# Patient Record
Sex: FEMALE | Race: WHITE | NOT HISPANIC OR LATINO | Employment: OTHER | URBAN - METROPOLITAN AREA
[De-identification: names, ages, dates, MRNs, and addresses within clinical notes are randomized per-mention and may not be internally consistent; named-entity substitution may affect disease eponyms.]

---

## 2023-02-03 ENCOUNTER — APPOINTMENT (OUTPATIENT)
Dept: RADIOLOGY | Facility: MEDICAL CENTER | Age: 63
DRG: 853 | End: 2023-02-03
Attending: EMERGENCY MEDICINE
Payer: MEDICARE

## 2023-02-03 ENCOUNTER — HOSPITAL ENCOUNTER (INPATIENT)
Facility: MEDICAL CENTER | Age: 63
LOS: 6 days | DRG: 853 | End: 2023-02-09
Attending: EMERGENCY MEDICINE | Admitting: INTERNAL MEDICINE
Payer: MEDICARE

## 2023-02-03 DIAGNOSIS — E11.10 DKA, TYPE 2, NOT AT GOAL (HCC): ICD-10-CM

## 2023-02-03 DIAGNOSIS — Z78.9 IMPAIRED MOBILITY AND ADLS: ICD-10-CM

## 2023-02-03 DIAGNOSIS — Z74.09 IMPAIRED MOBILITY AND ADLS: ICD-10-CM

## 2023-02-03 DIAGNOSIS — Z86.718 HISTORY OF DVT (DEEP VEIN THROMBOSIS): ICD-10-CM

## 2023-02-03 DIAGNOSIS — K12.2 ORAL ABSCESS: ICD-10-CM

## 2023-02-03 PROBLEM — E87.1 HYPONATREMIA: Status: ACTIVE | Noted: 2023-02-03

## 2023-02-03 PROBLEM — I82.409 DVT (DEEP VENOUS THROMBOSIS) (HCC): Status: ACTIVE | Noted: 2022-07-09

## 2023-02-03 PROBLEM — N17.9 AKI (ACUTE KIDNEY INJURY) (HCC): Status: ACTIVE | Noted: 2023-02-03

## 2023-02-03 PROBLEM — F15.90 AMPHETAMINE USE: Status: ACTIVE | Noted: 2023-02-03

## 2023-02-03 PROBLEM — E83.39 HYPERPHOSPHATEMIA: Status: ACTIVE | Noted: 2023-02-03

## 2023-02-03 PROBLEM — E87.20 METABOLIC ACIDOSIS: Status: ACTIVE | Noted: 2023-02-03

## 2023-02-03 PROBLEM — K85.90 ACUTE PANCREATITIS: Status: ACTIVE | Noted: 2023-02-03

## 2023-02-03 LAB
ALBUMIN SERPL BCP-MCNC: 3.2 G/DL (ref 3.2–4.9)
ALBUMIN SERPL BCP-MCNC: 4 G/DL (ref 3.2–4.9)
ALBUMIN/GLOB SERPL: 1 G/DL
ALBUMIN/GLOB SERPL: 1.1 G/DL
ALP SERPL-CCNC: 180 U/L (ref 30–99)
ALP SERPL-CCNC: 204 U/L (ref 30–99)
ALT SERPL-CCNC: 11 U/L (ref 2–50)
ALT SERPL-CCNC: 9 U/L (ref 2–50)
AMPHET UR QL SCN: POSITIVE
ANION GAP SERPL CALC-SCNC: 25 MMOL/L (ref 7–16)
ANION GAP SERPL CALC-SCNC: 31 MMOL/L (ref 7–16)
ANION GAP SERPL CALC-SCNC: 32 MMOL/L (ref 7–16)
ANION GAP SERPL CALC-SCNC: 33 MMOL/L (ref 7–16)
APPEARANCE UR: CLEAR
AST SERPL-CCNC: 11 U/L (ref 12–45)
AST SERPL-CCNC: 12 U/L (ref 12–45)
B-OH-BUTYR SERPL-MCNC: >8 MMOL/L (ref 0.02–0.27)
BACTERIA #/AREA URNS HPF: NEGATIVE /HPF
BARBITURATES UR QL SCN: NEGATIVE
BASOPHILS # BLD AUTO: 0.3 % (ref 0–1.8)
BASOPHILS # BLD AUTO: 0.5 % (ref 0–1.8)
BASOPHILS # BLD: 0.06 K/UL (ref 0–0.12)
BASOPHILS # BLD: 0.08 K/UL (ref 0–0.12)
BENZODIAZ UR QL SCN: NEGATIVE
BILIRUB SERPL-MCNC: 0.2 MG/DL (ref 0.1–1.5)
BILIRUB SERPL-MCNC: <0.2 MG/DL (ref 0.1–1.5)
BILIRUB UR QL STRIP.AUTO: NEGATIVE
BUN SERPL-MCNC: 39 MG/DL (ref 8–22)
BUN SERPL-MCNC: 40 MG/DL (ref 8–22)
BZE UR QL SCN: NEGATIVE
CALCIUM ALBUM COR SERPL-MCNC: 10.3 MG/DL (ref 8.5–10.5)
CALCIUM ALBUM COR SERPL-MCNC: 10.3 MG/DL (ref 8.5–10.5)
CALCIUM SERPL-MCNC: 10.1 MG/DL (ref 8.5–10.5)
CALCIUM SERPL-MCNC: 10.3 MG/DL (ref 8.5–10.5)
CALCIUM SERPL-MCNC: 9.7 MG/DL (ref 8.5–10.5)
CALCIUM SERPL-MCNC: 9.9 MG/DL (ref 8.5–10.5)
CANNABINOIDS UR QL SCN: NEGATIVE
CHLORIDE SERPL-SCNC: 104 MMOL/L (ref 96–112)
CHLORIDE SERPL-SCNC: 92 MMOL/L (ref 96–112)
CHLORIDE SERPL-SCNC: 95 MMOL/L (ref 96–112)
CHLORIDE SERPL-SCNC: 96 MMOL/L (ref 96–112)
CHOLEST SERPL-MCNC: 185 MG/DL (ref 100–199)
CO2 SERPL-SCNC: 3 MMOL/L (ref 20–33)
CO2 SERPL-SCNC: 6 MMOL/L (ref 20–33)
COLOR UR: YELLOW
CREAT SERPL-MCNC: 1.63 MG/DL (ref 0.5–1.4)
CREAT SERPL-MCNC: 1.65 MG/DL (ref 0.5–1.4)
CREAT SERPL-MCNC: 1.69 MG/DL (ref 0.5–1.4)
CREAT SERPL-MCNC: 1.89 MG/DL (ref 0.5–1.4)
EKG IMPRESSION: NORMAL
EOSINOPHIL # BLD AUTO: 0 K/UL (ref 0–0.51)
EOSINOPHIL # BLD AUTO: 0.01 K/UL (ref 0–0.51)
EOSINOPHIL NFR BLD: 0 % (ref 0–6.9)
EOSINOPHIL NFR BLD: 0.1 % (ref 0–6.9)
EPI CELLS #/AREA URNS HPF: NEGATIVE /HPF
ERYTHROCYTE [DISTWIDTH] IN BLOOD BY AUTOMATED COUNT: 45.6 FL (ref 35.9–50)
ERYTHROCYTE [DISTWIDTH] IN BLOOD BY AUTOMATED COUNT: 45.7 FL (ref 35.9–50)
GFR SERPLBLD CREATININE-BSD FMLA CKD-EPI: 30 ML/MIN/1.73 M 2
GFR SERPLBLD CREATININE-BSD FMLA CKD-EPI: 34 ML/MIN/1.73 M 2
GFR SERPLBLD CREATININE-BSD FMLA CKD-EPI: 35 ML/MIN/1.73 M 2
GFR SERPLBLD CREATININE-BSD FMLA CKD-EPI: 36 ML/MIN/1.73 M 2
GLOBULIN SER CALC-MCNC: 3.2 G/DL (ref 1.9–3.5)
GLOBULIN SER CALC-MCNC: 3.8 G/DL (ref 1.9–3.5)
GLUCOSE BLD STRIP.AUTO-MCNC: >600 MG/DL (ref 65–99)
GLUCOSE SERPL-MCNC: 459 MG/DL (ref 65–99)
GLUCOSE SERPL-MCNC: 698 MG/DL (ref 65–99)
GLUCOSE SERPL-MCNC: 745 MG/DL (ref 65–99)
GLUCOSE SERPL-MCNC: 764 MG/DL (ref 65–99)
GLUCOSE UR STRIP.AUTO-MCNC: >=1000 MG/DL
GRAM STN SPEC: NORMAL
HCT VFR BLD AUTO: 49.3 % (ref 37–47)
HCT VFR BLD AUTO: 50.3 % (ref 37–47)
HDLC SERPL-MCNC: 59 MG/DL
HGB BLD-MCNC: 16 G/DL (ref 12–16)
HGB BLD-MCNC: 16.5 G/DL (ref 12–16)
HYALINE CASTS #/AREA URNS LPF: ABNORMAL /LPF
IMM GRANULOCYTES # BLD AUTO: 0.19 K/UL (ref 0–0.11)
IMM GRANULOCYTES # BLD AUTO: 0.25 K/UL (ref 0–0.11)
IMM GRANULOCYTES NFR BLD AUTO: 1 % (ref 0–0.9)
IMM GRANULOCYTES NFR BLD AUTO: 1.4 % (ref 0–0.9)
INR PPP: 1.39 (ref 0.87–1.13)
KETONES UR STRIP.AUTO-MCNC: 80 MG/DL
LACTATE SERPL-SCNC: 2 MMOL/L (ref 0.5–2)
LACTATE SERPL-SCNC: 2.1 MMOL/L (ref 0.5–2)
LACTATE SERPL-SCNC: 3.6 MMOL/L (ref 0.5–2)
LDLC SERPL CALC-MCNC: 81 MG/DL
LEUKOCYTE ESTERASE UR QL STRIP.AUTO: NEGATIVE
LIPASE SERPL-CCNC: >3000 U/L (ref 11–82)
LYMPHOCYTES # BLD AUTO: 0.83 K/UL (ref 1–4.8)
LYMPHOCYTES # BLD AUTO: 0.89 K/UL (ref 1–4.8)
LYMPHOCYTES NFR BLD: 4.7 % (ref 22–41)
LYMPHOCYTES NFR BLD: 4.9 % (ref 22–41)
MAGNESIUM SERPL-MCNC: 1.8 MG/DL (ref 1.5–2.5)
MAGNESIUM SERPL-MCNC: 2.1 MG/DL (ref 1.5–2.5)
MAGNESIUM SERPL-MCNC: 2.4 MG/DL (ref 1.5–2.5)
MCH RBC QN AUTO: 30.7 PG (ref 27–33)
MCH RBC QN AUTO: 31 PG (ref 27–33)
MCHC RBC AUTO-ENTMCNC: 32.5 G/DL (ref 33.6–35)
MCHC RBC AUTO-ENTMCNC: 32.8 G/DL (ref 33.6–35)
MCV RBC AUTO: 94.4 FL (ref 81.4–97.8)
MCV RBC AUTO: 94.4 FL (ref 81.4–97.8)
METHADONE UR QL SCN: NEGATIVE
MICRO URNS: ABNORMAL
MONOCYTES # BLD AUTO: 2.21 K/UL (ref 0–0.85)
MONOCYTES # BLD AUTO: 2.26 K/UL (ref 0–0.85)
MONOCYTES NFR BLD AUTO: 12.1 % (ref 0–13.4)
MONOCYTES NFR BLD AUTO: 12.9 % (ref 0–13.4)
NEUTROPHILS # BLD AUTO: 14.09 K/UL (ref 2–7.15)
NEUTROPHILS # BLD AUTO: 14.9 K/UL (ref 2–7.15)
NEUTROPHILS NFR BLD: 80.4 % (ref 44–72)
NEUTROPHILS NFR BLD: 81.7 % (ref 44–72)
NITRITE UR QL STRIP.AUTO: NEGATIVE
NRBC # BLD AUTO: 0 K/UL
NRBC # BLD AUTO: 0 K/UL
NRBC BLD-RTO: 0 /100 WBC
NRBC BLD-RTO: 0 /100 WBC
OPIATES UR QL SCN: NEGATIVE
OXYCODONE UR QL SCN: POSITIVE
PCP UR QL SCN: NEGATIVE
PH UR STRIP.AUTO: 5 [PH] (ref 5–8)
PHOSPHATE SERPL-MCNC: 3.5 MG/DL (ref 2.5–4.5)
PHOSPHATE SERPL-MCNC: 6.5 MG/DL (ref 2.5–4.5)
PHOSPHATE SERPL-MCNC: 8.7 MG/DL (ref 2.5–4.5)
PLATELET # BLD AUTO: 375 K/UL (ref 164–446)
PLATELET # BLD AUTO: 447 K/UL (ref 164–446)
PMV BLD AUTO: 10.7 FL (ref 9–12.9)
PMV BLD AUTO: 10.9 FL (ref 9–12.9)
POTASSIUM SERPL-SCNC: 3.3 MMOL/L (ref 3.6–5.5)
POTASSIUM SERPL-SCNC: 4.7 MMOL/L (ref 3.6–5.5)
POTASSIUM SERPL-SCNC: 4.8 MMOL/L (ref 3.6–5.5)
POTASSIUM SERPL-SCNC: 5.4 MMOL/L (ref 3.6–5.5)
PROPOXYPH UR QL SCN: NEGATIVE
PROT SERPL-MCNC: 6.4 G/DL (ref 6–8.2)
PROT SERPL-MCNC: 7.8 G/DL (ref 6–8.2)
PROT UR QL STRIP: 100 MG/DL
PROTHROMBIN TIME: 16.8 SEC (ref 12–14.6)
RBC # BLD AUTO: 5.22 M/UL (ref 4.2–5.4)
RBC # BLD AUTO: 5.33 M/UL (ref 4.2–5.4)
RBC # URNS HPF: ABNORMAL /HPF
RBC UR QL AUTO: ABNORMAL
SIGNIFICANT IND 70042: NORMAL
SITE SITE: NORMAL
SODIUM SERPL-SCNC: 128 MMOL/L (ref 135–145)
SODIUM SERPL-SCNC: 130 MMOL/L (ref 135–145)
SODIUM SERPL-SCNC: 130 MMOL/L (ref 135–145)
SODIUM SERPL-SCNC: 135 MMOL/L (ref 135–145)
SOURCE SOURCE: NORMAL
SP GR UR STRIP.AUTO: 1.02
TRIGL SERPL-MCNC: 224 MG/DL (ref 0–149)
UROBILINOGEN UR STRIP.AUTO-MCNC: 0.2 MG/DL
WBC # BLD AUTO: 17.5 K/UL (ref 4.8–10.8)
WBC # BLD AUTO: 18.3 K/UL (ref 4.8–10.8)
WBC #/AREA URNS HPF: ABNORMAL /HPF

## 2023-02-03 PROCEDURE — 700111 HCHG RX REV CODE 636 W/ 250 OVERRIDE (IP): Performed by: EMERGENCY MEDICINE

## 2023-02-03 PROCEDURE — 84100 ASSAY OF PHOSPHORUS: CPT

## 2023-02-03 PROCEDURE — 85610 PROTHROMBIN TIME: CPT

## 2023-02-03 PROCEDURE — 303977 HCHG I & D

## 2023-02-03 PROCEDURE — 70491 CT SOFT TISSUE NECK W/DYE: CPT

## 2023-02-03 PROCEDURE — 700102 HCHG RX REV CODE 250 W/ 637 OVERRIDE(OP): Performed by: EMERGENCY MEDICINE

## 2023-02-03 PROCEDURE — 770022 HCHG ROOM/CARE - ICU (200)

## 2023-02-03 PROCEDURE — 96375 TX/PRO/DX INJ NEW DRUG ADDON: CPT

## 2023-02-03 PROCEDURE — A9270 NON-COVERED ITEM OR SERVICE: HCPCS | Performed by: EMERGENCY MEDICINE

## 2023-02-03 PROCEDURE — 82010 KETONE BODYS QUAN: CPT

## 2023-02-03 PROCEDURE — 87040 BLOOD CULTURE FOR BACTERIA: CPT | Mod: 91

## 2023-02-03 PROCEDURE — 87077 CULTURE AEROBIC IDENTIFY: CPT

## 2023-02-03 PROCEDURE — 71045 X-RAY EXAM CHEST 1 VIEW: CPT

## 2023-02-03 PROCEDURE — 700102 HCHG RX REV CODE 250 W/ 637 OVERRIDE(OP): Performed by: INTERNAL MEDICINE

## 2023-02-03 PROCEDURE — 83605 ASSAY OF LACTIC ACID: CPT

## 2023-02-03 PROCEDURE — 99291 CRITICAL CARE FIRST HOUR: CPT | Mod: GC | Performed by: INTERNAL MEDICINE

## 2023-02-03 PROCEDURE — 700105 HCHG RX REV CODE 258: Performed by: EMERGENCY MEDICINE

## 2023-02-03 PROCEDURE — 36415 COLL VENOUS BLD VENIPUNCTURE: CPT

## 2023-02-03 PROCEDURE — 96365 THER/PROPH/DIAG IV INF INIT: CPT

## 2023-02-03 PROCEDURE — 80307 DRUG TEST PRSMV CHEM ANLYZR: CPT

## 2023-02-03 PROCEDURE — 303105 HCHG CATHETER EXTRA

## 2023-02-03 PROCEDURE — 87070 CULTURE OTHR SPECIMN AEROBIC: CPT

## 2023-02-03 PROCEDURE — 99291 CRITICAL CARE FIRST HOUR: CPT

## 2023-02-03 PROCEDURE — 85025 COMPLETE CBC W/AUTO DIFF WBC: CPT

## 2023-02-03 PROCEDURE — 51702 INSERT TEMP BLADDER CATH: CPT

## 2023-02-03 PROCEDURE — 83690 ASSAY OF LIPASE: CPT

## 2023-02-03 PROCEDURE — 93005 ELECTROCARDIOGRAM TRACING: CPT | Performed by: EMERGENCY MEDICINE

## 2023-02-03 PROCEDURE — 0J910ZZ DRAINAGE OF FACE SUBCUTANEOUS TISSUE AND FASCIA, OPEN APPROACH: ICD-10-PCS | Performed by: EMERGENCY MEDICINE

## 2023-02-03 PROCEDURE — 700105 HCHG RX REV CODE 258: Performed by: INTERNAL MEDICINE

## 2023-02-03 PROCEDURE — 80061 LIPID PANEL: CPT

## 2023-02-03 PROCEDURE — 76705 ECHO EXAM OF ABDOMEN: CPT

## 2023-02-03 PROCEDURE — 82962 GLUCOSE BLOOD TEST: CPT | Mod: 91

## 2023-02-03 PROCEDURE — 87076 CULTURE ANAEROBE IDENT EACH: CPT

## 2023-02-03 PROCEDURE — 87086 URINE CULTURE/COLONY COUNT: CPT

## 2023-02-03 PROCEDURE — 80048 BASIC METABOLIC PNL TOTAL CA: CPT

## 2023-02-03 PROCEDURE — 700117 HCHG RX CONTRAST REV CODE 255: Performed by: EMERGENCY MEDICINE

## 2023-02-03 PROCEDURE — 80053 COMPREHEN METABOLIC PANEL: CPT

## 2023-02-03 PROCEDURE — 700111 HCHG RX REV CODE 636 W/ 250 OVERRIDE (IP): Performed by: STUDENT IN AN ORGANIZED HEALTH CARE EDUCATION/TRAINING PROGRAM

## 2023-02-03 PROCEDURE — 700111 HCHG RX REV CODE 636 W/ 250 OVERRIDE (IP): Performed by: INTERNAL MEDICINE

## 2023-02-03 PROCEDURE — 83735 ASSAY OF MAGNESIUM: CPT

## 2023-02-03 PROCEDURE — 81001 URINALYSIS AUTO W/SCOPE: CPT

## 2023-02-03 PROCEDURE — 87205 SMEAR GRAM STAIN: CPT

## 2023-02-03 RX ORDER — OXYCODONE HYDROCHLORIDE 5 MG/1
5 TABLET ORAL
Status: DISCONTINUED | OUTPATIENT
Start: 2023-02-03 | End: 2023-02-05

## 2023-02-03 RX ORDER — BISACODYL 10 MG
10 SUPPOSITORY, RECTAL RECTAL
Status: DISCONTINUED | OUTPATIENT
Start: 2023-02-03 | End: 2023-02-09 | Stop reason: HOSPADM

## 2023-02-03 RX ORDER — HYDROMORPHONE HYDROCHLORIDE 1 MG/ML
1 INJECTION, SOLUTION INTRAMUSCULAR; INTRAVENOUS; SUBCUTANEOUS ONCE
Status: COMPLETED | OUTPATIENT
Start: 2023-02-03 | End: 2023-02-03

## 2023-02-03 RX ORDER — NYSTATIN 100000 [USP'U]/G
POWDER TOPICAL 2 TIMES DAILY
Status: COMPLETED | OUTPATIENT
Start: 2023-02-03 | End: 2023-02-06

## 2023-02-03 RX ORDER — HEPARIN SODIUM 5000 [USP'U]/ML
5000 INJECTION, SOLUTION INTRAVENOUS; SUBCUTANEOUS EVERY 8 HOURS
Status: DISCONTINUED | OUTPATIENT
Start: 2023-02-03 | End: 2023-02-03

## 2023-02-03 RX ORDER — POTASSIUM CHLORIDE 7.45 MG/ML
10 INJECTION INTRAVENOUS
Status: COMPLETED | OUTPATIENT
Start: 2023-02-04 | End: 2023-02-04

## 2023-02-03 RX ORDER — DEXTROSE AND SODIUM CHLORIDE 10; .45 G/100ML; G/100ML
INJECTION, SOLUTION INTRAVENOUS CONTINUOUS
Status: ACTIVE | OUTPATIENT
Start: 2023-02-03 | End: 2023-02-04

## 2023-02-03 RX ORDER — DEXTROSE, SODIUM CHLORIDE, SODIUM LACTATE, POTASSIUM CHLORIDE, AND CALCIUM CHLORIDE 5; .6; .31; .03; .02 G/100ML; G/100ML; G/100ML; G/100ML; G/100ML
INJECTION, SOLUTION INTRAVENOUS CONTINUOUS
Status: DISCONTINUED | OUTPATIENT
Start: 2023-02-03 | End: 2023-02-04

## 2023-02-03 RX ORDER — HYDROMORPHONE HYDROCHLORIDE 1 MG/ML
0.5 INJECTION, SOLUTION INTRAMUSCULAR; INTRAVENOUS; SUBCUTANEOUS
Status: DISCONTINUED | OUTPATIENT
Start: 2023-02-03 | End: 2023-02-05

## 2023-02-03 RX ORDER — MAGNESIUM SULFATE HEPTAHYDRATE 40 MG/ML
4 INJECTION, SOLUTION INTRAVENOUS
Status: COMPLETED | OUTPATIENT
Start: 2023-02-03 | End: 2023-02-03

## 2023-02-03 RX ORDER — SODIUM CHLORIDE, SODIUM LACTATE, POTASSIUM CHLORIDE, AND CALCIUM CHLORIDE .6; .31; .03; .02 G/100ML; G/100ML; G/100ML; G/100ML
2000 INJECTION, SOLUTION INTRAVENOUS ONCE
Status: ACTIVE | OUTPATIENT
Start: 2023-02-03 | End: 2023-02-04

## 2023-02-03 RX ORDER — SODIUM CHLORIDE 9 MG/ML
2000 INJECTION, SOLUTION INTRAVENOUS ONCE
Status: DISCONTINUED | OUTPATIENT
Start: 2023-02-03 | End: 2023-02-03

## 2023-02-03 RX ORDER — AMOXICILLIN 250 MG
2 CAPSULE ORAL 2 TIMES DAILY
Status: DISCONTINUED | OUTPATIENT
Start: 2023-02-03 | End: 2023-02-09 | Stop reason: HOSPADM

## 2023-02-03 RX ORDER — POLYETHYLENE GLYCOL 3350 17 G/17G
1 POWDER, FOR SOLUTION ORAL
Status: DISCONTINUED | OUTPATIENT
Start: 2023-02-03 | End: 2023-02-09 | Stop reason: HOSPADM

## 2023-02-03 RX ORDER — ACETAMINOPHEN 500 MG
1000 TABLET ORAL EVERY 6 HOURS
Status: DISCONTINUED | OUTPATIENT
Start: 2023-02-03 | End: 2023-02-04

## 2023-02-03 RX ORDER — OXYCODONE HYDROCHLORIDE 10 MG/1
10 TABLET ORAL
Status: DISCONTINUED | OUTPATIENT
Start: 2023-02-03 | End: 2023-02-05

## 2023-02-03 RX ORDER — ACETAMINOPHEN 500 MG
1000 TABLET ORAL EVERY 6 HOURS PRN
Status: DISCONTINUED | OUTPATIENT
Start: 2023-02-08 | End: 2023-02-04

## 2023-02-03 RX ORDER — SODIUM CHLORIDE 9 MG/ML
INJECTION, SOLUTION INTRAVENOUS CONTINUOUS
Status: DISCONTINUED | OUTPATIENT
Start: 2023-02-03 | End: 2023-02-05

## 2023-02-03 RX ORDER — SODIUM CHLORIDE, SODIUM LACTATE, POTASSIUM CHLORIDE, AND CALCIUM CHLORIDE .6; .31; .03; .02 G/100ML; G/100ML; G/100ML; G/100ML
30 INJECTION, SOLUTION INTRAVENOUS ONCE
Status: COMPLETED | OUTPATIENT
Start: 2023-02-03 | End: 2023-02-03

## 2023-02-03 RX ORDER — MAGNESIUM SULFATE HEPTAHYDRATE 40 MG/ML
2 INJECTION, SOLUTION INTRAVENOUS
Status: COMPLETED | OUTPATIENT
Start: 2023-02-03 | End: 2023-02-03

## 2023-02-03 RX ORDER — SODIUM CHLORIDE, SODIUM LACTATE, POTASSIUM CHLORIDE, CALCIUM CHLORIDE 600; 310; 30; 20 MG/100ML; MG/100ML; MG/100ML; MG/100ML
INJECTION, SOLUTION INTRAVENOUS CONTINUOUS
Status: DISCONTINUED | OUTPATIENT
Start: 2023-02-03 | End: 2023-02-04

## 2023-02-03 RX ADMIN — SODIUM CHLORIDE, POTASSIUM CHLORIDE, SODIUM LACTATE AND CALCIUM CHLORIDE: 600; 310; 30; 20 INJECTION, SOLUTION INTRAVENOUS at 17:05

## 2023-02-03 RX ADMIN — AMPICILLIN AND SULBACTAM 3 G: 1; 2 INJECTION, POWDER, FOR SOLUTION INTRAMUSCULAR; INTRAVENOUS at 15:09

## 2023-02-03 RX ADMIN — SODIUM CHLORIDE 5 UNITS/HR: 9 INJECTION, SOLUTION INTRAVENOUS at 17:03

## 2023-02-03 RX ADMIN — POTASSIUM CHLORIDE 10 MEQ: 7.46 INJECTION, SOLUTION INTRAVENOUS at 23:45

## 2023-02-03 RX ADMIN — MAGNESIUM SULFATE HEPTAHYDRATE 2 G: 40 INJECTION, SOLUTION INTRAVENOUS at 18:53

## 2023-02-03 RX ADMIN — SODIUM CHLORIDE, POTASSIUM CHLORIDE, SODIUM LACTATE AND CALCIUM CHLORIDE 1917 ML: 600; 310; 30; 20 INJECTION, SOLUTION INTRAVENOUS at 14:04

## 2023-02-03 RX ADMIN — HYDROMORPHONE HYDROCHLORIDE 1 MG: 1 INJECTION, SOLUTION INTRAMUSCULAR; INTRAVENOUS; SUBCUTANEOUS at 15:26

## 2023-02-03 RX ADMIN — NYSTATIN: 100000 POWDER TOPICAL at 15:10

## 2023-02-03 RX ADMIN — SODIUM CHLORIDE: 9 INJECTION, SOLUTION INTRAVENOUS at 18:52

## 2023-02-03 RX ADMIN — HYDROMORPHONE HYDROCHLORIDE 0.5 MG: 1 INJECTION, SOLUTION INTRAMUSCULAR; INTRAVENOUS; SUBCUTANEOUS at 20:40

## 2023-02-03 RX ADMIN — IOHEXOL 100 ML: 350 INJECTION, SOLUTION INTRAVENOUS at 16:33

## 2023-02-03 RX ADMIN — FENTANYL CITRATE 50 MCG: 50 INJECTION, SOLUTION INTRAMUSCULAR; INTRAVENOUS at 14:03

## 2023-02-03 RX ADMIN — HEPARIN SODIUM 5000 UNITS: 5000 INJECTION, SOLUTION INTRAVENOUS; SUBCUTANEOUS at 22:56

## 2023-02-03 RX ADMIN — AMPICILLIN AND SULBACTAM 3 G: 1; 2 INJECTION, POWDER, FOR SOLUTION INTRAMUSCULAR; INTRAVENOUS at 23:08

## 2023-02-03 ASSESSMENT — FIBROSIS 4 INDEX: FIB4 SCORE: 0.78

## 2023-02-03 ASSESSMENT — ENCOUNTER SYMPTOMS
DIZZINESS: 1
SHORTNESS OF BREATH: 0
HEADACHES: 0
ABDOMINAL PAIN: 0
FOCAL WEAKNESS: 0
NAUSEA: 0
COUGH: 0
VOMITING: 0
CHILLS: 0
FEVER: 0
WEAKNESS: 1

## 2023-02-03 ASSESSMENT — PAIN DESCRIPTION - PAIN TYPE
TYPE: ACUTE PAIN

## 2023-02-03 NOTE — ED PROVIDER NOTES
"ED Provider Note    CHIEF COMPLAINT  Chief Complaint   Patient presents with    ALOC       EXTERNAL RECORDS REVIEWED  Prior emergency department visits and admission from [facility].    HPI/ROS  LIMITATION TO HISTORY   Select: Altered mental status / Confusion  OUTSIDE HISTORIAN(S):  EMS      Fannie Driver is a 62 y.o. female who presents altered.  Patient has longstanding history of diabetes, and DVT.  Patient was brought in by EMS.  Patient significantly agitated, and unable to provide much history here.  She is mildly confused.  Patient reports pain all over.  She reports pain in her face, and she reports has had what she believes is a dental abscess for the last 2 weeks.  She reports she had something similar in the past.  Patient is unsure if she has had any fever.  Patient denies any abdominal pain.  She reports some pain near her vagina.  Patient Dors is nausea and vomiting.  Patient history is limited as she is very agitated and only sporadically will answer questions clearly.    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    SURGICAL HISTORY   has a past surgical history that includes other abdominal surgery; cholecystectomy (10/09); and ercp in or (10/30/2009).    FAMILY HISTORY  No family history on file.    SOCIAL HISTORY  Social History     Tobacco Use    Smoking status: Not on file    Smokeless tobacco: Not on file   Substance and Sexual Activity    Alcohol use: Not on file    Drug use: Not on file    Sexual activity: Not on file       CURRENT MEDICATIONS  Home Medications    **Home medications have not yet been reviewed for this encounter**         ALLERGIES  No Known Allergies    PHYSICAL EXAM  VITAL SIGNS: /60   Pulse 79   Temp 35.8 °C (96.5 °F) (Temporal)   Resp 18   Ht 1.727 m (5' 8\")   Wt 99.8 kg (220 lb 0.3 oz)   SpO2 98%   BMI 33.45 kg/m²  "   Physical Exam  Constitutional:       Appearance: Normal appearance.   HENT:      Head: Atraumatic.      Comments: Left mandible, distal fluid with approximately 4 cm x 4 cm fluctuant mass with overlying erythema and tenderness with considerable facial swelling.     Right Ear: Tympanic membrane normal.      Left Ear: Tympanic membrane normal.      Nose: Nose normal.      Mouth/Throat:      Mouth: Mucous membranes are moist.   Eyes:      Extraocular Movements: Extraocular movements intact.      Pupils: Pupils are equal, round, and reactive to light.   Cardiovascular:      Rate and Rhythm: Normal rate and regular rhythm.      Heart sounds: No murmur heard.    No friction rub.   Pulmonary:      Breath sounds: Normal breath sounds.      Comments: Significantly tachypneic with Kussmaul breathing  Abdominal:      General: Abdomen is flat.      Palpations: Abdomen is soft.      Tenderness: There is no abdominal tenderness.   Genitourinary:     Comments: Well-demarcated beefy red erythema on patient's labia consistent with candidiasis, no extension of erythema into the perineum.  Musculoskeletal:      Comments: Moving all extremities.  No focal tenderness on exam.  Patient's bilateral feet are cold and relatively mottled however she has great pulses, both PT and DP are both with triphasic signals on Doppler.   Skin:     General: Skin is warm.      Capillary Refill: Capillary refill takes less than 2 seconds.   Neurological:      General: No focal deficit present.      Mental Status: She is alert and oriented to person, place, and time.   Psychiatric:         Mood and Affect: Mood normal.         Behavior: Behavior normal.         DIAGNOSTIC STUDIES / PROCEDURES  EKG  I have independently interpreted this EKG  See below    LABS  Results for orders placed or performed during the hospital encounter of 02/03/23   CBC With Differential   Result Value Ref Range    WBC 18.3 (H) 4.8 - 10.8 K/uL    RBC 5.33 4.20 - 5.40 M/uL     Hemoglobin 16.5 (H) 12.0 - 16.0 g/dL    Hematocrit 50.3 (H) 37.0 - 47.0 %    MCV 94.4 81.4 - 97.8 fL    MCH 31.0 27.0 - 33.0 pg    MCHC 32.8 (L) 33.6 - 35.0 g/dL    RDW 45.7 35.9 - 50.0 fL    Platelet Count 447 (H) 164 - 446 K/uL    MPV 10.9 9.0 - 12.9 fL    Neutrophils-Polys 81.70 (H) 44.00 - 72.00 %    Lymphocytes 4.90 (L) 22.00 - 41.00 %    Monocytes 12.10 0.00 - 13.40 %    Eosinophils 0.00 0.00 - 6.90 %    Basophils 0.30 0.00 - 1.80 %    Immature Granulocytes 1.00 (H) 0.00 - 0.90 %    Nucleated RBC 0.00 /100 WBC    Neutrophils (Absolute) 14.90 (H) 2.00 - 7.15 K/uL    Lymphs (Absolute) 0.89 (L) 1.00 - 4.80 K/uL    Monos (Absolute) 2.21 (H) 0.00 - 0.85 K/uL    Eos (Absolute) 0.00 0.00 - 0.51 K/uL    Baso (Absolute) 0.06 0.00 - 0.12 K/uL    Immature Granulocytes (abs) 0.19 (H) 0.00 - 0.11 K/uL    NRBC (Absolute) 0.00 K/uL   Comp Metabolic Panel   Result Value Ref Range    Sodium 128 (L) 135 - 145 mmol/L    Potassium 5.4 3.6 - 5.5 mmol/L    Chloride 92 (L) 96 - 112 mmol/L    Co2 3 (LL) 20 - 33 mmol/L    Anion Gap 33.0 (H) 7.0 - 16.0    Glucose 764 (HH) 65 - 99 mg/dL    Bun 39 (H) 8 - 22 mg/dL    Creatinine 1.89 (H) 0.50 - 1.40 mg/dL    Calcium 10.3 8.5 - 10.5 mg/dL    AST(SGOT) 11 (L) 12 - 45 U/L    ALT(SGPT) 11 2 - 50 U/L    Alkaline Phosphatase 204 (H) 30 - 99 U/L    Total Bilirubin 0.2 0.1 - 1.5 mg/dL    Albumin 4.0 3.2 - 4.9 g/dL    Total Protein 7.8 6.0 - 8.2 g/dL    Globulin 3.8 (H) 1.9 - 3.5 g/dL    A-G Ratio 1.1 g/dL   Urinalysis    Specimen: Urine   Result Value Ref Range    Color Yellow     Character Clear     Specific Gravity 1.022 <1.035    Ph 5.0 5.0 - 8.0    Glucose >=1000 (A) Negative mg/dL    Ketones 80 (A) Negative mg/dL    Protein 100 (A) Negative mg/dL    Bilirubin Negative Negative    Urobilinogen, Urine 0.2 Negative    Nitrite Negative Negative    Leukocyte Esterase Negative Negative    Occult Blood Moderate (A) Negative    Micro Urine Req Microscopic    Lactic Acid   Result Value Ref Range     Lactic Acid 3.6 (H) 0.5 - 2.0 mmol/L   Lactic Acid   Result Value Ref Range    Lactic Acid 2.1 (H) 0.5 - 2.0 mmol/L   Lipase   Result Value Ref Range    Lipase >3000 (H) 11 - 82 U/L   BETA-HYDROXYBUTYRIC ACID   Result Value Ref Range    beta-Hydroxybutyric Acid >8.00 (H) 0.02 - 0.27 mmol/L   URINE DRUG SCREEN   Result Value Ref Range    Amphetamines Urine Positive (A) Negative    Barbiturates Negative Negative    Benzodiazepines Negative Negative    Cocaine Metabolite Negative Negative    Methadone Negative Negative    Opiates Negative Negative    Oxycodone Positive (A) Negative    Phencyclidine -Pcp Negative Negative    Propoxyphene Negative Negative    Cannabinoid Metab Negative Negative   URINE MICROSCOPIC (W/UA)   Result Value Ref Range    WBC 2-5 /hpf    RBC 0-2 /hpf    Bacteria Negative None /hpf    Epithelial Cells Negative /hpf    Hyaline Cast 3-5 (A) /lpf   MAGNESIUM   Result Value Ref Range    Magnesium 2.1 1.5 - 2.5 mg/dL   PHOSPHORUS   Result Value Ref Range    Phosphorus 8.7 (H) 2.5 - 4.5 mg/dL   CORRECTED CALCIUM   Result Value Ref Range    Correct Calcium 10.3 8.5 - 10.5 mg/dL   ESTIMATED GFR   Result Value Ref Range    GFR (CKD-EPI) 30 (A) >60 mL/min/1.73 m 2   CBC with Differential   Result Value Ref Range    WBC 17.5 (H) 4.8 - 10.8 K/uL    RBC 5.22 4.20 - 5.40 M/uL    Hemoglobin 16.0 12.0 - 16.0 g/dL    Hematocrit 49.3 (H) 37.0 - 47.0 %    MCV 94.4 81.4 - 97.8 fL    MCH 30.7 27.0 - 33.0 pg    MCHC 32.5 (L) 33.6 - 35.0 g/dL    RDW 45.6 35.9 - 50.0 fL    Platelet Count 375 164 - 446 K/uL    MPV 10.7 9.0 - 12.9 fL    Neutrophils-Polys 80.40 (H) 44.00 - 72.00 %    Lymphocytes 4.70 (L) 22.00 - 41.00 %    Monocytes 12.90 0.00 - 13.40 %    Eosinophils 0.10 0.00 - 6.90 %    Basophils 0.50 0.00 - 1.80 %    Immature Granulocytes 1.40 (H) 0.00 - 0.90 %    Nucleated RBC 0.00 /100 WBC    Neutrophils (Absolute) 14.09 (H) 2.00 - 7.15 K/uL    Lymphs (Absolute) 0.83 (L) 1.00 - 4.80 K/uL    Monos (Absolute) 2.26 (H)  0.00 - 0.85 K/uL    Eos (Absolute) 0.01 0.00 - 0.51 K/uL    Baso (Absolute) 0.08 0.00 - 0.12 K/uL    Immature Granulocytes (abs) 0.25 (H) 0.00 - 0.11 K/uL    NRBC (Absolute) 0.00 K/uL   Prothrombin time (INR)   Result Value Ref Range    PT 16.8 (H) 12.0 - 14.6 sec    INR 1.39 (H) 0.87 - 1.13   EKG   Result Value Ref Range    Report       [facility] Emergency Dept.    Test Date:  2023  Pt Name:    IZA ORDONEZ                Department: ER  MRN:        [id number]                      Room:       [facility]  Gender:     Female                       Technician: [id number]  :        1960                   Requested By:WALLACE DAVID  Order #:    [id number]                    Reading MD: Tadeo Dixon MD    Measurements  Intervals                                Axis  Rate:       75                           P:          49  HI:         200                          QRS:        3  QRSD:       103                          T:          172  QT:         474  QTc:        530    Interpretive Statements  EKG is sinus rhythm, normal axis normal intervals no ST changes consistent  with  acute regional ischemia, I believe the QTC is being read incorrectly here, I  believe this is capturing part of patient's Uwave of and the QTC is normal.  Electronically Signed On 2-3-2023 14:41:19 PST by Tadeo Dixon MD     POCT glucose device results   Result Value Ref Range    POC Glucose, Blood >600 (HH) 65 - 99 mg/dL   POCT glucose device results   Result Value Ref Range    POC Glucose, Blood >600 (HH) 65 - 99 mg/dL         RADIOLOGY  I have independently interpreted the diagnostic imaging associated with this visit and am waiting the final reading from the radiologist.   My preliminary interpretation is a follows: Large dental abscess, Radiologist interpretation:   CT-SOFT TISSUE NECK WITH   Final Result      1.  LEFT facial subcutaneous fluid collection measuring 3.5 cm.  Abscess is a possibility however there is no  significant surrounding inflammatory changes   2.  Small LEFT anterior maxillary tooth abscess.   3.  Heterogeneous LEFT thyroid nodule measuring 18 mm.  Recommend follow-up nonemergent ultrasound.      US-RUQ   Final Result      1.  Prior cholecystectomy.   2.  Mild biliary dilation.   3.  Echogenic shadowing focus in the melania hepatis, possibly stone in the common hepatic duct.   4.  Limited evaluation of pancreas.      DX-CHEST-PORTABLE (1 VIEW)   Final Result      No acute cardiopulmonary disease.      [id number]/O    (Results Pending)         COURSE & MEDICAL DECISION MAKING      Incision and Drainage Procedure Note    Indication: Abscess    Procedure: Patient facial abscess was identified, using an 18-gauge needle, I drained the abscess to the buccal mucosa, I drained approximately 10 cc of purulent fluid.    The patient tolerated the procedure well.    Complications: None      45 minutes of critical care were spent with patient.    INITIAL ASSESSMENT, COURSE AND PLAN  Care Narrative:     Agitated patient here, tachypneic, with presentation concerning for possible DKA.  Patient also with facial abscess.  Given Unasyn.  I did perform a quick incision and drainage but patient will likely need further operative management.  Will check Panorex and CT for further evaluation to help our maxillofacial surgeons.  Patient bilateral lower extremities are mottled but have great pulses and signal, I believe vascular emergency is unlikely given this finding.  It is unclear if patient was outside, her presentation does appear most consistent with likely chilblains, and we have had multiple days below freezing lately.  Patient without any associated chest pain or shortness of breath, history is obviously limited given the patient is very agitated.  Sending urinalysis, and blood cultures.  Patient given aggressive IV fluids for possible developing sepsis versus DKA.  I performed a drainage of patient's facial  abscess.  Patient labs have returned, they are consistent with severe pancreatitis, and DKA.  Possibly secondary to sepsis.  Patient started on insulin drip.  Case discussed with oral surgery who will see patient when she is more medically stable.  Patient labs consistent with significant DKA.  Patient started on insulin drip.  Aggressive fluids will be continued.  Ultrasound ordered for patient's severe pancreatitis.  Patient case discussed with intensivist who has agreed to admit.  Patient ultrasound questionable for possible stone in the common bile duct or melania hepatis, MRI abdomen ordered.      DISPOSITION AND DISCUSSIONS      FINAL DIAGNOSIS  Dental abscess, DKA, severe pancreatitis, severe dehydration, altered mental status

## 2023-02-03 NOTE — ED NOTES
Per [facility] pharmacy, pt last filled metformin for 30 days on 10/21, trulicity on 11/2022, and xarelto 20 mg on 9/27 for 30 days.   Pt has medications at bedside, per bottles they were all last filled in 2022. Rx bottles are full.   Pt is unable to provide information if she is still taking medications.

## 2023-02-03 NOTE — ED NOTES
Pt sister Alisha called ph: [phone].would like someone to talk to her regarding pt's current condition. Educated Alisha pt would be getting admitted to hospital. Alisha requesting updater as soon as pt can give it

## 2023-02-03 NOTE — ED TRIAGE NOTES
BIB [facility] for ALOC. Hx DM2. Large abcess to left side of face. Alert to self only. Kussmaul breathing noted

## 2023-02-04 ENCOUNTER — APPOINTMENT (OUTPATIENT)
Dept: RADIOLOGY | Facility: MEDICAL CENTER | Age: 63
DRG: 853 | End: 2023-02-04
Payer: MEDICARE

## 2023-02-04 LAB
ANION GAP SERPL CALC-SCNC: 11 MMOL/L (ref 7–16)
ANION GAP SERPL CALC-SCNC: 12 MMOL/L (ref 7–16)
ANION GAP SERPL CALC-SCNC: 16 MMOL/L (ref 7–16)
ANION GAP SERPL CALC-SCNC: 16 MMOL/L (ref 7–16)
BUN SERPL-MCNC: 31 MG/DL (ref 8–22)
BUN SERPL-MCNC: 35 MG/DL (ref 8–22)
BUN SERPL-MCNC: 37 MG/DL (ref 8–22)
BUN SERPL-MCNC: 38 MG/DL (ref 8–22)
CALCIUM SERPL-MCNC: 9.4 MG/DL (ref 8.5–10.5)
CALCIUM SERPL-MCNC: 9.8 MG/DL (ref 8.5–10.5)
CALCIUM SERPL-MCNC: 9.8 MG/DL (ref 8.5–10.5)
CALCIUM SERPL-MCNC: 9.9 MG/DL (ref 8.5–10.5)
CHLORIDE SERPL-SCNC: 110 MMOL/L (ref 96–112)
CHLORIDE SERPL-SCNC: 110 MMOL/L (ref 96–112)
CHLORIDE SERPL-SCNC: 112 MMOL/L (ref 96–112)
CHLORIDE SERPL-SCNC: 117 MMOL/L (ref 96–112)
CO2 SERPL-SCNC: 11 MMOL/L (ref 20–33)
CO2 SERPL-SCNC: 12 MMOL/L (ref 20–33)
CO2 SERPL-SCNC: 15 MMOL/L (ref 20–33)
CO2 SERPL-SCNC: 15 MMOL/L (ref 20–33)
CREAT SERPL-MCNC: 1.1 MG/DL (ref 0.5–1.4)
CREAT SERPL-MCNC: 1.32 MG/DL (ref 0.5–1.4)
CREAT SERPL-MCNC: 1.34 MG/DL (ref 0.5–1.4)
CREAT SERPL-MCNC: 1.46 MG/DL (ref 0.5–1.4)
GFR SERPLBLD CREATININE-BSD FMLA CKD-EPI: 41 ML/MIN/1.73 M 2
GFR SERPLBLD CREATININE-BSD FMLA CKD-EPI: 45 ML/MIN/1.73 M 2
GFR SERPLBLD CREATININE-BSD FMLA CKD-EPI: 46 ML/MIN/1.73 M 2
GFR SERPLBLD CREATININE-BSD FMLA CKD-EPI: 57 ML/MIN/1.73 M 2
GLUCOSE BLD STRIP.AUTO-MCNC: 125 MG/DL (ref 65–99)
GLUCOSE BLD STRIP.AUTO-MCNC: 134 MG/DL (ref 65–99)
GLUCOSE BLD STRIP.AUTO-MCNC: 134 MG/DL (ref 65–99)
GLUCOSE BLD STRIP.AUTO-MCNC: 138 MG/DL (ref 65–99)
GLUCOSE BLD STRIP.AUTO-MCNC: 147 MG/DL (ref 65–99)
GLUCOSE BLD STRIP.AUTO-MCNC: 167 MG/DL (ref 65–99)
GLUCOSE BLD STRIP.AUTO-MCNC: 198 MG/DL (ref 65–99)
GLUCOSE BLD STRIP.AUTO-MCNC: 210 MG/DL (ref 65–99)
GLUCOSE BLD STRIP.AUTO-MCNC: 219 MG/DL (ref 65–99)
GLUCOSE BLD STRIP.AUTO-MCNC: 219 MG/DL (ref 65–99)
GLUCOSE BLD STRIP.AUTO-MCNC: 221 MG/DL (ref 65–99)
GLUCOSE BLD STRIP.AUTO-MCNC: 236 MG/DL (ref 65–99)
GLUCOSE BLD STRIP.AUTO-MCNC: 236 MG/DL (ref 65–99)
GLUCOSE BLD STRIP.AUTO-MCNC: 238 MG/DL (ref 65–99)
GLUCOSE BLD STRIP.AUTO-MCNC: 266 MG/DL (ref 65–99)
GLUCOSE BLD STRIP.AUTO-MCNC: 301 MG/DL (ref 65–99)
GLUCOSE BLD STRIP.AUTO-MCNC: 345 MG/DL (ref 65–99)
GLUCOSE BLD STRIP.AUTO-MCNC: 385 MG/DL (ref 65–99)
GLUCOSE BLD STRIP.AUTO-MCNC: 471 MG/DL (ref 65–99)
GLUCOSE BLD STRIP.AUTO-MCNC: 536 MG/DL (ref 65–99)
GLUCOSE BLD STRIP.AUTO-MCNC: 97 MG/DL (ref 65–99)
GLUCOSE SERPL-MCNC: 123 MG/DL (ref 65–99)
GLUCOSE SERPL-MCNC: 162 MG/DL (ref 65–99)
GLUCOSE SERPL-MCNC: 257 MG/DL (ref 65–99)
GLUCOSE SERPL-MCNC: 259 MG/DL (ref 65–99)
LACTATE SERPL-SCNC: 1.9 MMOL/L (ref 0.5–2)
MAGNESIUM SERPL-MCNC: 1.9 MG/DL (ref 1.5–2.5)
MAGNESIUM SERPL-MCNC: 2.3 MG/DL (ref 1.5–2.5)
PHOSPHATE SERPL-MCNC: 1.6 MG/DL (ref 2.5–4.5)
PHOSPHATE SERPL-MCNC: 2.7 MG/DL (ref 2.5–4.5)
POTASSIUM SERPL-SCNC: 3.2 MMOL/L (ref 3.6–5.5)
POTASSIUM SERPL-SCNC: 3.4 MMOL/L (ref 3.6–5.5)
POTASSIUM SERPL-SCNC: 3.5 MMOL/L (ref 3.6–5.5)
POTASSIUM SERPL-SCNC: 3.8 MMOL/L (ref 3.6–5.5)
SODIUM SERPL-SCNC: 137 MMOL/L (ref 135–145)
SODIUM SERPL-SCNC: 138 MMOL/L (ref 135–145)
SODIUM SERPL-SCNC: 139 MMOL/L (ref 135–145)
SODIUM SERPL-SCNC: 143 MMOL/L (ref 135–145)
T4 FREE SERPL-MCNC: 1.22 NG/DL (ref 0.93–1.7)
TSH SERPL DL<=0.005 MIU/L-ACNC: 1.11 UIU/ML (ref 0.38–5.33)

## 2023-02-04 PROCEDURE — 84439 ASSAY OF FREE THYROXINE: CPT

## 2023-02-04 PROCEDURE — 84100 ASSAY OF PHOSPHORUS: CPT

## 2023-02-04 PROCEDURE — 99291 CRITICAL CARE FIRST HOUR: CPT | Performed by: EMERGENCY MEDICINE

## 2023-02-04 PROCEDURE — 93970 EXTREMITY STUDY: CPT | Mod: 26 | Performed by: INTERNAL MEDICINE

## 2023-02-04 PROCEDURE — 82962 GLUCOSE BLOOD TEST: CPT

## 2023-02-04 PROCEDURE — 700111 HCHG RX REV CODE 636 W/ 250 OVERRIDE (IP): Performed by: GENERAL PRACTICE

## 2023-02-04 PROCEDURE — 83735 ASSAY OF MAGNESIUM: CPT | Mod: 91

## 2023-02-04 PROCEDURE — A9270 NON-COVERED ITEM OR SERVICE: HCPCS | Performed by: INTERNAL MEDICINE

## 2023-02-04 PROCEDURE — 770022 HCHG ROOM/CARE - ICU (200)

## 2023-02-04 PROCEDURE — 700102 HCHG RX REV CODE 250 W/ 637 OVERRIDE(OP): Performed by: INTERNAL MEDICINE

## 2023-02-04 PROCEDURE — 700102 HCHG RX REV CODE 250 W/ 637 OVERRIDE(OP): Performed by: EMERGENCY MEDICINE

## 2023-02-04 PROCEDURE — A9270 NON-COVERED ITEM OR SERVICE: HCPCS | Performed by: STUDENT IN AN ORGANIZED HEALTH CARE EDUCATION/TRAINING PROGRAM

## 2023-02-04 PROCEDURE — 700101 HCHG RX REV CODE 250: Performed by: INTERNAL MEDICINE

## 2023-02-04 PROCEDURE — 700111 HCHG RX REV CODE 636 W/ 250 OVERRIDE (IP): Performed by: INTERNAL MEDICINE

## 2023-02-04 PROCEDURE — 700102 HCHG RX REV CODE 250 W/ 637 OVERRIDE(OP): Performed by: STUDENT IN AN ORGANIZED HEALTH CARE EDUCATION/TRAINING PROGRAM

## 2023-02-04 PROCEDURE — 84443 ASSAY THYROID STIM HORMONE: CPT

## 2023-02-04 PROCEDURE — 93970 EXTREMITY STUDY: CPT

## 2023-02-04 PROCEDURE — 83605 ASSAY OF LACTIC ACID: CPT

## 2023-02-04 PROCEDURE — A9270 NON-COVERED ITEM OR SERVICE: HCPCS | Performed by: EMERGENCY MEDICINE

## 2023-02-04 PROCEDURE — 700111 HCHG RX REV CODE 636 W/ 250 OVERRIDE (IP): Performed by: EMERGENCY MEDICINE

## 2023-02-04 PROCEDURE — 80048 BASIC METABOLIC PNL TOTAL CA: CPT | Mod: 91

## 2023-02-04 PROCEDURE — 700105 HCHG RX REV CODE 258: Performed by: INTERNAL MEDICINE

## 2023-02-04 RX ORDER — POTASSIUM CHLORIDE 7.45 MG/ML
10 INJECTION INTRAVENOUS
Status: DISPENSED | OUTPATIENT
Start: 2023-02-04 | End: 2023-02-04

## 2023-02-04 RX ORDER — ACETAMINOPHEN 500 MG
1000 TABLET ORAL EVERY 6 HOURS
Status: DISCONTINUED | OUTPATIENT
Start: 2023-02-04 | End: 2023-02-09 | Stop reason: HOSPADM

## 2023-02-04 RX ORDER — POTASSIUM CHLORIDE 7.45 MG/ML
10 INJECTION INTRAVENOUS
Status: COMPLETED | OUTPATIENT
Start: 2023-02-04 | End: 2023-02-04

## 2023-02-04 RX ORDER — INSULIN LISPRO 100 [IU]/ML
2-9 INJECTION, SOLUTION INTRAVENOUS; SUBCUTANEOUS
Status: DISCONTINUED | OUTPATIENT
Start: 2023-02-04 | End: 2023-02-05

## 2023-02-04 RX ORDER — MAGNESIUM SULFATE HEPTAHYDRATE 40 MG/ML
2 INJECTION, SOLUTION INTRAVENOUS ONCE
Status: COMPLETED | OUTPATIENT
Start: 2023-02-04 | End: 2023-02-04

## 2023-02-04 RX ORDER — POTASSIUM CHLORIDE 7.45 MG/ML
10 INJECTION INTRAVENOUS
Status: ACTIVE | OUTPATIENT
Start: 2023-02-04 | End: 2023-02-04

## 2023-02-04 RX ORDER — INSULIN LISPRO 100 [IU]/ML
2-9 INJECTION, SOLUTION INTRAVENOUS; SUBCUTANEOUS
Status: DISCONTINUED | OUTPATIENT
Start: 2023-02-04 | End: 2023-02-04

## 2023-02-04 RX ADMIN — ACETAMINOPHEN 1000 MG: 500 TABLET, FILM COATED ORAL at 17:35

## 2023-02-04 RX ADMIN — POTASSIUM CHLORIDE 10 MEQ: 7.46 INJECTION, SOLUTION INTRAVENOUS at 01:49

## 2023-02-04 RX ADMIN — DEXTROSE AND SODIUM CHLORIDE: 10; .45 INJECTION, SOLUTION INTRAVENOUS at 12:30

## 2023-02-04 RX ADMIN — AMPICILLIN AND SULBACTAM 3 G: 1; 2 INJECTION, POWDER, FOR SOLUTION INTRAMUSCULAR; INTRAVENOUS at 06:03

## 2023-02-04 RX ADMIN — RIVAROXABAN 20 MG: 20 TABLET, FILM COATED ORAL at 17:36

## 2023-02-04 RX ADMIN — POTASSIUM CHLORIDE 10 MEQ: 7.46 INJECTION, SOLUTION INTRAVENOUS at 05:00

## 2023-02-04 RX ADMIN — AMPICILLIN AND SULBACTAM 3 G: 1; 2 INJECTION, POWDER, FOR SOLUTION INTRAMUSCULAR; INTRAVENOUS at 17:38

## 2023-02-04 RX ADMIN — INSULIN GLARGINE-YFGN 19 UNITS: 100 INJECTION, SOLUTION SUBCUTANEOUS at 16:46

## 2023-02-04 RX ADMIN — POTASSIUM CHLORIDE 10 MEQ: 7.46 INJECTION, SOLUTION INTRAVENOUS at 00:47

## 2023-02-04 RX ADMIN — POTASSIUM CHLORIDE 10 MEQ: 7.46 INJECTION, SOLUTION INTRAVENOUS at 02:50

## 2023-02-04 RX ADMIN — SODIUM CHLORIDE 13 UNITS/HR: 9 INJECTION, SOLUTION INTRAVENOUS at 15:37

## 2023-02-04 RX ADMIN — POTASSIUM CHLORIDE 10 MEQ: 7.46 INJECTION, SOLUTION INTRAVENOUS at 07:39

## 2023-02-04 RX ADMIN — AMPICILLIN AND SULBACTAM 3 G: 1; 2 INJECTION, POWDER, FOR SOLUTION INTRAMUSCULAR; INTRAVENOUS at 11:35

## 2023-02-04 RX ADMIN — POTASSIUM PHOSPHATE, MONOBASIC AND POTASSIUM PHOSPHATE, DIBASIC 30 MMOL: 224; 236 INJECTION, SOLUTION, CONCENTRATE INTRAVENOUS at 08:35

## 2023-02-04 RX ADMIN — POTASSIUM CHLORIDE 10 MEQ: 7.46 INJECTION, SOLUTION INTRAVENOUS at 12:45

## 2023-02-04 RX ADMIN — POTASSIUM CHLORIDE 10 MEQ: 7.46 INJECTION, SOLUTION INTRAVENOUS at 18:47

## 2023-02-04 RX ADMIN — HYDROMORPHONE HYDROCHLORIDE 0.5 MG: 1 INJECTION, SOLUTION INTRAMUSCULAR; INTRAVENOUS; SUBCUTANEOUS at 06:03

## 2023-02-04 RX ADMIN — HYDROMORPHONE HYDROCHLORIDE 0.5 MG: 1 INJECTION, SOLUTION INTRAMUSCULAR; INTRAVENOUS; SUBCUTANEOUS at 11:32

## 2023-02-04 RX ADMIN — NYSTATIN: 100000 POWDER TOPICAL at 06:05

## 2023-02-04 RX ADMIN — POTASSIUM CHLORIDE 10 MEQ: 7.46 INJECTION, SOLUTION INTRAVENOUS at 16:51

## 2023-02-04 RX ADMIN — POTASSIUM CHLORIDE 10 MEQ: 7.46 INJECTION, SOLUTION INTRAVENOUS at 14:36

## 2023-02-04 RX ADMIN — SODIUM CHLORIDE 13 UNITS/HR: 9 INJECTION, SOLUTION INTRAVENOUS at 07:09

## 2023-02-04 RX ADMIN — SODIUM CHLORIDE, SODIUM LACTATE, POTASSIUM CHLORIDE, CALCIUM CHLORIDE AND DEXTROSE MONOHYDRATE: 5; 600; 310; 30; 20 INJECTION, SOLUTION INTRAVENOUS at 11:23

## 2023-02-04 RX ADMIN — POTASSIUM CHLORIDE 10 MEQ: 7.46 INJECTION, SOLUTION INTRAVENOUS at 03:54

## 2023-02-04 RX ADMIN — SODIUM CHLORIDE, SODIUM LACTATE, POTASSIUM CHLORIDE, CALCIUM CHLORIDE AND DEXTROSE MONOHYDRATE 1000 ML: 5; 600; 310; 30; 20 INJECTION, SOLUTION INTRAVENOUS at 02:35

## 2023-02-04 RX ADMIN — MAGNESIUM SULFATE HEPTAHYDRATE 2 G: 40 INJECTION, SOLUTION INTRAVENOUS at 09:33

## 2023-02-04 RX ADMIN — NYSTATIN: 100000 POWDER TOPICAL at 17:54

## 2023-02-04 RX ADMIN — POTASSIUM CHLORIDE 10 MEQ: 7.46 INJECTION, SOLUTION INTRAVENOUS at 06:05

## 2023-02-04 RX ADMIN — SENNOSIDES AND DOCUSATE SODIUM 2 TABLET: 50; 8.6 TABLET ORAL at 17:36

## 2023-02-04 ASSESSMENT — PAIN DESCRIPTION - PAIN TYPE
TYPE: ACUTE PAIN
TYPE: CHRONIC PAIN
TYPE: ACUTE PAIN
TYPE: ACUTE PAIN

## 2023-02-04 ASSESSMENT — COGNITIVE AND FUNCTIONAL STATUS - GENERAL
WALKING IN HOSPITAL ROOM: TOTAL
DRESSING REGULAR UPPER BODY CLOTHING: TOTAL
DAILY ACTIVITIY SCORE: 7
MOBILITY SCORE: 14
HELP NEEDED FOR BATHING: TOTAL
CLIMB 3 TO 5 STEPS WITH RAILING: TOTAL
EATING MEALS: TOTAL
SUGGESTED CMS G CODE MODIFIER DAILY ACTIVITY: CM
DRESSING REGULAR LOWER BODY CLOTHING: A LOT
STANDING UP FROM CHAIR USING ARMS: TOTAL
TURNING FROM BACK TO SIDE WHILE IN FLAT BAD: A LITTLE
SUGGESTED CMS G CODE MODIFIER MOBILITY: CL
TOILETING: TOTAL
PERSONAL GROOMING: TOTAL

## 2023-02-04 ASSESSMENT — FIBROSIS 4 INDEX: FIB4 SCORE: 0.65

## 2023-02-04 NOTE — ASSESSMENT & PLAN NOTE
Hyperphosphatemia. Unclear etiology, unlikely to be CKD given previous normal renal function.  -Phos recheck  -consider Sevelemer PO TID

## 2023-02-04 NOTE — CONSULTS
HPI: Fannie Driver is a 62 y.o. female who presented to the ED with altered consciousness.  Patient has longstanding history of diabetes, and DVT.  Patient was brought in by EMS.  Patient significantly agitated, and unable to provide much history. Found to be in DKA and admitted for stabilization.  OMFS consulted for left sided facial swelling associated with carious teeth.     Today on insulin drip, still confused and not oriented. Glucose now in the 200's.     Vitals:    02/04/23 0900   BP: 105/49   Pulse: (!) 103   Resp: 13   Temp:    SpO2: 96%     PE:   Gen: not oriented to person/place/time  HEENT: NC/AT, PERRL, EOMI B, left sided fluctuant swelling overlying mandibular body. Border of mandible palpable from angle to angle, trachea midline  Max/Abi: gary 40 mm, fom soft, nt/nd, multiple carious teeth in left lower quadrant. Uvula midline.   Neuro: Unable to fully assess CN II-XII    Recent Labs     02/03/23  1340 02/03/23  1712   WBC 18.3* 17.5*   RBC 5.33 5.22   HEMOGLOBIN 16.5* 16.0   HEMATOCRIT 50.3* 49.3*   MCV 94.4 94.4   MCH 31.0 30.7   RDW 45.7 45.6   PLATELETCT 447* 375   MPV 10.9 10.7   NEUTSPOLYS 81.70* 80.40*   LYMPHOCYTES 4.90* 4.70*   MONOCYTES 12.10 12.90   EOSINOPHILS 0.00 0.10   BASOPHILS 0.30 0.50     Recent Labs     02/03/23  2230 02/04/23  0238 02/04/23  0600   SODIUM 135 138 137   POTASSIUM 3.3* 3.2* 3.8   CHLORIDE 104 110 110   CO2 6* 12* 11*   GLUCOSE 459* 259* 257*   BUN 40* 37* 38*     Imaging:   CT neck shows:   1.  LEFT facial subcutaneous fluid collection measuring 3.5 cm.  Abscess is a possibility however there is no significant surrounding inflammatory changes  2.  Small LEFT anterior maxillary tooth abscess.    Assessment: Fannie Driver is a 63 y/o female in DKA and with a left buccal odontogenic abscess    Plan:   Will require OR I&D once DKA stabilized  Agree with IV unasyn  Plan on OR for I&D tentatively on Monday AM pending DKA status.   Call with questions or concerns  [phone]  Steve Luong DDS, MD, FACS

## 2023-02-04 NOTE — PROGRESS NOTES
"Critical Care Medicine Attending Note  (see full Resident note in EPIC)    I have seen and examined the patient today.  I have reviewed the medical record, laboratory data, imaging, and all relevant studies.  I have discussed the assessment and plan of care with the Internal Medicine Resident and agree with their note and plan as documented.       \"62-year-old female with past medical history of diabetes, left lower extremity DVT who presented to the ER with altered mental status.  She was found to be in DKA and septic due to a left facial/odontogenic abscess.  Her abscess had a small I&D performed in the ER and OMFS was consulted.  The patient was given 3 L of crystalloid resuscitation and ICU admission was requested for treatment of her DKA.     Other pertinent labs to return in the ER include a lipase of >3,000 and a UDS + for amphetamines.\" H+P 2/3/23    Hospital Course  No notes on file    Interval events/data by system:  Hypokalemia overnight, insulin held, IV K administered  Continues to be somewhat encephalopathic      Neuro:   RASS 0 to -1  Hydromorphone 0.5x2    CV:  Increasing heart rate overnight 115 this morning  SBP 90s to 130  No pressors    Resp:   Room air 95 to 99%  Chest x-ray benign    GI: N.p.o.  No BM  LFTs okay, alkaline phosphatase elevated  Lipase greater than 3000  Right upper quadrant ultrasound with shadowing in the melania hepatis    I/O: +3350  UOP: 1425  3 L LR, 1 L NS    Tmax: Afebrile    Heme:   WBC 17.5 from 18.3    Abx:   Unasyn  Blood cultures negative to date    Endo: DKA, insulin infusion held, currently running at 12  Blood glucose from 600-2 100s    LDA: PIV's, Vivas catheter    SUP: Not indicated  VTE: Rivaroxaban therapeutic for history of DVT  Diet: N.p.o.      Physical Exam:   Very disheveled, moaning, following commands no focal neurologic deficits but drowsy  ENT examination notable for palpable fullness in the left mandibular region with intraoral drainage of purulent " material through an incision in the buccal gingival reflection, she has what appears to be a clear decayed tooth adjacent to the side, no stridor airway is otherwise okay    Cardiac no murmur gallop, equal pulses in 4 extremities she is warm and perfused  Abdomen tender across the epigastric and right upper quadrant region there is no rigidity or peritonitis      Diagnostics:   WBC 17.5, hemoglobin 16, platelets 375  Metabolic panel noted, creatinine 1.32 from 165, bicarb 11 from 3, triglycerides 224, mag 1.9 Phos 1.6 INR 1.39  LFTs okay with exception of alkaline phosphatase of 180      Assessment/Plan:  Diabetic ketoacidosis  Elevated anion gap metabolic acidosis  Hypokalemia  Left facial abscess from odontogenic source  Acute pancreatitis  Pseudohyponatremia  Nonoliguric MAICOL  Amphetamine use disorder  History of DVT on Xarelto    Patient's DKA is improving, with improvement of her anion gap from 32-16, still with a significant acidosis likely partially attributable to chloride administration given her rising chloride from 92-1 10 in combination with her closing of her gap.  I think the patient is euvolemic at this time we will withhold further resuscitative volume.      Patient is ill-appearing, her facial abscess is still draining, she has been seen by OMFS.  Antibiotics will be continued Unasyn appropriate for now for odontogenic oral abscess without airway compromise.    Continue replacing electrolytes, magnesium phosphorus slightly low today she is been requiring significant addition of potassium and this resulted in cessation of insulin infusion overnight for period of time to avoid critical hypokalemia and has thus required careful titration of her insulin infusion.  Urine output adequate no evidence of nephrogenic DI at this point, we will continue to monitor closely.    Patient with markedly elevated lipase and a nondiagnostic right upper quadrant ultrasound, concern for retained hepatic duct stone in the  setting of prior cholecystectomy.  LFTs are okay does not appear to be septic not on vasopressors but will pursue MRCP to evaluate.    We will follow-up thyroid function test she did noted to have a nodule on her CT scan we will follow this up with an ultrasound.        Discussed patient condition and risk of morbidity and/or mortality with MDR, OMFS, bedside, and patient    The patient remains critically ill with a guarded prognosis.     Critical care time = 40 minutes in directly providing and coordinating critical care and extensive data review. No time overlap and excludes procedures.

## 2023-02-04 NOTE — ASSESSMENT & PLAN NOTE
Past amphetamine use, positive on UDS.   Does report possible amphetamine use in recent days.  Cessation counseling.

## 2023-02-04 NOTE — CARE PLAN
Problem: Knowledge Deficit - Standard  Goal: Patient and family/care givers will demonstrate understanding of plan of care, disease process/condition, diagnostic tests and medications  Outcome: Progressing     Problem: Pain - Standard  Goal: Alleviation of pain or a reduction in pain to the patient’s comfort goal  Outcome: Progressing     Problem: Fall Risk  Goal: Patient will remain free from falls  Outcome: Progressing     Problem: Physical Regulation  Goal: Diagnostic test results will improve  Outcome: Progressing  Goal: Signs and symptoms of infection will decrease  Outcome: Progressing     Problem: Mechanical Ventilation  Goal: Successful weaning off mechanical ventilator, spontaneously maintains adequate gas exchange  Outcome: Progressing     Problem: Fall Risk  Goal: Patient will remain free from falls  Outcome: Progressing   The patient is Watcher - Medium risk of patient condition declining or worsening    Shift Goals  Clinical Goals: monitor blood sugars  Patient Goals: comfort  Family Goals: comfort    Progress made toward(s) clinical / shift goals:      Patient is not progressing towards the following goals:

## 2023-02-04 NOTE — CARE PLAN
The patient is Watcher - Medium risk of patient condition declining or worsening         Progress made toward(s) clinical / shift goals:  pt hemodynamically stable, pain controlled with current PRNs, still having kussmal breaths but on RA, adequate uop  Problem: Pain - Standard  Goal: Alleviation of pain or a reduction in pain to the patient’s comfort goal  Outcome: Progressing     Problem: Respiratory  Goal: Patient will achieve/maintain optimum respiratory ventilation and gas exchange  Outcome: Progressing     Problem: Urinary - Renal Perfusion  Goal: Ability to achieve and maintain adequate renal perfusion and functioning will improve  Outcome: Progressing     Problem: Hemodynamics  Goal: Patient's hemodynamics, fluid balance and neurologic status will be stable or improve  Outcome: Progressing       Patient is not progressing towards the following goals:

## 2023-02-04 NOTE — ASSESSMENT & PLAN NOTE
Likely due to dehydration-->resolved  S/p IVF resuscitation  Renal dose meds, avoid nephrotoxins  Strict I/Os  Follow renal function

## 2023-02-04 NOTE — ASSESSMENT & PLAN NOTE
Left peridontal abcess extending outward.   ERP attempted a small I&D with 18 ga needle  Continue Unasyn 3g IV q6h  OMFS took patient for I&D on 2/6  Wound with Streptococcus angionosus and Cutibacterium acnes  Blood cultures are negative from 2/3

## 2023-02-04 NOTE — H&P
"[facility] H&P/consult      Admit Date: 2/3/2023    Resident(s): Robbie Wellington M.D.   Attending:  MARÍA LAYTON/ Dr. MEENAKSHI Shah DO    Patient ID:    Name:  Fannie Driver   YOB: 1960  Age:  62 y.o.  female   MRN:  [id number]    Hospital Course (carried forward and updated):  Fannie Driver is a 62F presents in DKA; pmhx inclusive of DM2, DVT (xarelto); admitted to ICU with DKA.    Brought in by EMS for loss of consciousness. Was found to have severe hyperglycemia. She doesn't recall the events prior to her coming to the hospital, but does remember being in ambulance. She has felt weak/dizzy for some time. She has used IV drugs in the past, but by her recollection, her most recent amphetamine use was inhaled and approximate 1 week ago. Initiated on insulin gtt in ED with some improvement in mentation.    Consultants:  Critical Care       Interval Events:  2/3: admitted to CICU for DKA.    Review of Systems   Constitutional:  Positive for malaise/fatigue. Negative for chills and fever.   HENT:          Mouth pain   Respiratory:  Negative for cough and shortness of breath.    Gastrointestinal:  Negative for abdominal pain, nausea and vomiting.   Neurological:  Positive for dizziness and weakness. Negative for focal weakness and headaches.     PHYSICAL EXAM:  Vitals:    02/03/23 1500 02/03/23 1515 02/03/23 1530 02/03/23 1545   BP: 121/60 115/57 111/54 111/54   Pulse: 77 77 80 79   Resp: 16 (!) 28 (!) 28 18   Temp:       TempSrc:       SpO2: 100% 100% 100% 100%   Weight:       Height:        Body mass index is 33.45 kg/m².  Latest Vitals:  /54   Pulse 79   Temp 35.8 °C (96.5 °F) (Temporal)   Resp 18   Ht 1.727 m (5' 8\")   Wt 99.8 kg (220 lb 0.3 oz)   SpO2 100%   BMI 33.45 kg/m²   O2 therapy: Pulse Oximetry: 100 %, O2 Delivery Device: None - Room Air  Vitals Range last 24h:  Temp:  [35.8 °C (96.5 °F)] 35.8 °C (96.5 °F)  Pulse:  [75-80] 79  Resp:  [13-28] 18  BP: (111124)/(54-73) 111/54  SpO2: "  [98 %-100 %] 100 %     No intake or output data in the 24 hours ending 02/03/23 1708     Physical Exam  Vitals and nursing note reviewed.   Constitutional:       General: She is in acute distress.      Appearance: She is ill-appearing. She is not diaphoretic.   HENT:      Head:      Comments: AT/NC, large erythematous mass of L cheek; missing dentition     Mouth/Throat:      Mouth: Mucous membranes are dry.      Pharynx: Oropharynx is clear.   Eyes:      Extraocular Movements: Extraocular movements intact.      Pupils: Pupils are equal, round, and reactive to light.   Cardiovascular:      Rate and Rhythm: Regular rhythm. Tachycardia present.      Heart sounds: No murmur heard.    No friction rub. No gallop.   Pulmonary:      Breath sounds: No wheezing, rhonchi or rales.   Abdominal:      General: There is no distension.      Tenderness: There is no abdominal tenderness. There is no guarding or rebound.   Musculoskeletal:      Cervical back: Normal range of motion.   Skin:     General: Skin is warm and dry.   Neurological:      General: No focal deficit present.      Mental Status: She is alert and oriented to person, place, and time.           Recent Labs     02/03/23  1340   SODIUM 128*   POTASSIUM 5.4   CHLORIDE 92*   CO2 3*   BUN 39*   CREATININE 1.89*   MAGNESIUM 2.1   PHOSPHORUS 8.7*   CALCIUM 10.3     Recent Labs     02/03/23  1340   ALTSGPT 11   ASTSGOT 11*   ALKPHOSPHAT 204*   TBILIRUBIN 0.2   LIPASE >3000*   GLUCOSE 764*     Recent Labs     02/03/23  1340   RBC 5.33   HEMOGLOBIN 16.5*   HEMATOCRIT 50.3*   PLATELETCT 447*     Recent Labs     02/03/23  1340   WBC 18.3*   NEUTSPOLYS 81.70*   LYMPHOCYTES 4.90*   MONOCYTES 12.10   EOSINOPHILS 0.00   BASOPHILS 0.30   ASTSGOT 11*   ALTSGPT 11   ALKPHOSPHAT 204*   TBILIRUBIN 0.2       Meds:   nystatin        LR  2,000 mL      D10%-0.45% NaCl        MD ALERT-PHARMACY TO CONSULT  1 Each      magnesium sulfate  2 g      Or    magnesium sulfate  4 g      potassium  phosphate ivpb  30 mmol      Or    sodium phosphate 30 mmol ivpb  30 mmol      Adult DKA potassium(K+) replacement scale  1 Each      D5LR        LR   125 mL/hr at 02/03/23 1705    MD ALERT-INSULIN DRIP INITIAL RATE BY PHARMACY  1 Each      [START ON 2/4/2023] ampicillin-sulbactam (UNASYN) IV  3 g      Pharmacy Consult Request  1 Each      acetaminophen  1,000 mg      Followed by    [START ON 2/8/2023] acetaminophen  1,000 mg      oxyCODONE immediate-release  5 mg      Or    oxyCODONE immediate-release  10 mg      Or    HYDROmorphone  0.5 mg      insulin infusion (for DKA ONLY)  5 Units/hr 5 Units/hr (02/03/23 1703)    senna-docusate  2 Tablet      And    polyethylene glycol/lytes  1 Packet      And    magnesium hydroxide  30 mL      And    bisacodyl  10 mg      Respiratory Therapy Consult        heparin  5,000 Units          Procedures:  N/a    Imaging:  CT-SOFT TISSUE NECK WITH   Final Result      1.  LEFT facial subcutaneous fluid collection measuring 3.5 cm.  Abscess is a possibility however there is no significant surrounding inflammatory changes   2.  Small LEFT anterior maxillary tooth abscess.   3.  Heterogeneous LEFT thyroid nodule measuring 18 mm.  Recommend follow-up nonemergent ultrasound.      US-RUQ   Final Result      1.  Prior cholecystectomy.   2.  Mild biliary dilation.   3.  Echogenic shadowing focus in the melania hepatis, possibly stone in the common hepatic duct.   4.  Limited evaluation of pancreas.      DX-CHEST-PORTABLE (1 VIEW)   Final Result      No acute cardiopulmonary disease.      [id number]/O    (Results Pending)       Problem and Plan:    Fannie Driver is a 62F presents in DKA; pmhx inclusive of DM2, DVT (xarelto); admitted to ICU with DKA.    * DKA, type 2, not at goal (HCC)- (present on admission)  Assessment & Plan  Presents in DKA, non-encephalopathic. A1c recent of 13. Possible triggers/factors: L oral abscess, amphetamine use, difficult medication adherence.  -DKA protocols  with 0.05u/kg/hr  -BMP q4h  -K repletion protocols  -LR IVF per protocols  -NPO      Hyperphosphatemia  Assessment & Plan  Hyperphosphatemia. Unclear etiology, unlikely to be CKD given previous normal renal function.  -Phos recheck  -consider Sevelemer PO TID    Acute pancreatitis  Assessment & Plan  Acute pancreatitis on presentation. Unlikely EtOH related, possible hypertriglyceridemia, biliary stone (cholecystecomy).  -Lipid panel (triglyceride check)  -MRCP  -LR IVF 125ml/hr  -Oxycodone 5-10mg PO q3h/PRN  -NPO, initiate feeding as able in setting of DKA    Oral abscess  Assessment & Plan  Chronic, large appearing L oral/cheek abscess. Attempted expression of abscess in ED. OMFS consulted in ED.  -Unasyn 3g IV q6h  -OMFS to follow, consideration of I&D    Hyponatremia  Assessment & Plan  Pseudohyponatremia, corrected for glucose is 139.  -BMP q4h    Metabolic acidosis  Assessment & Plan  Metabolic acidosis related to DKA.  -BMP q4h  -LR IVF per protocols    MAICOL (acute kidney injury) (HCC)  Assessment & Plan  Baseline Cr estimated @ 0.7. Most likely related to amphetamine use, hypovolemic secondary to DKA state.  -LR IVF  -BMP q4h  -I/O  -avoid nephrotoxin  -renally dose medications    Amphetamine use  Assessment & Plan  Past amphetamine use, positive on UDS. Does report possible amphetamine use in recent days.  -counseling on cessation as able to participate        DISPO: CICU, DKA protocols    CODE STATUS: FULL    Quality Measures:  Feeding: NPO  Analgesia: oxycodone  Sedation: n/a  Thromboprophylaxis: heparin  Head of bed: >30 degrees  Ulcer prophylaxis: n/a  Glycemic control: insulin gtt  Bowel care: bowel regimen  Indwelling lines: PIV  Deescalation of antibiotics: wenceslao Wellington M.D.

## 2023-02-04 NOTE — ASSESSMENT & PLAN NOTE
Possible triggers/factors: L oral abscess, amphetamine use, non-compliance  Transitioned off DKA protocol  Cont Glargine 30 units daily and adjust as needed  High ISS   DM education  DM diet

## 2023-02-04 NOTE — ASSESSMENT & PLAN NOTE
Acute pancreatitis on presentation. Unlikely EtOH related, possible hypertriglyceridemia, biliary stone   Abdominal exam is quite benign-->advance diet  RUQ US: Echogenic shadowing focus in the melania hepatis, possibly stone in the common hepatic duct. Awaiting abdominal MRI.   ?MRCP

## 2023-02-04 NOTE — PROGRESS NOTES
Daily Progress Note:     Date of Service: 2/4/2023  Primary Team: [facility]   Attending: Maximo Eubanks M.D.   Senior Resident: Arleen Irwin M.D.      Chief Complaint:   Facial swelling, loss of consciousness    ID:  Ms. Fannie Driver is a 62 year old female with a medical history of type 2 diabetes, left lower extremity DVT who presented to the ER with altered mental status. Found to be in DKA and possible sepsis due to left facial/odontogenic abscess. In the ED, small I&D was performed. Was given 3 L of crystalloid resuscitation. Lipase >3,000 and UDS positive for amphetamines.     Admitted to ICU for treatment of DKA.       Interval History:   Patient continues to be altered, moaning. Responds to voice, but in unintelligible. Has decreased pulses in extremities, will order a DVT ultrasound. Continued on insulin drip. OMFS evaluated patient, will require OR and I&D once DKA is stabilized (tentatively Monday).     -Dispo: ICU    Consultants/Specialty:  Oral Surgery     Review of Systems:    Review of Systems   Unable to perform ROS: Mental status change     Objective:   Physical Exam:   Vitals:   Temp:  [33.1 °C (91.6 °F)-36.8 °C (98.2 °F)] 36.8 °C (98.2 °F)  Pulse:  [] 101  Resp:  [9-32] 13  BP: ()/(46-73) 107/52  SpO2:  [93 %-100 %] 97 %    Physical Exam  HENT:      Head: Normocephalic and atraumatic.      Mouth/Throat:      Comments: Swelling on left side of face, abscess present. Erythema and 2.5 cm protrusion present.   Eyes:      Extraocular Movements: Extraocular movements intact.      Pupils: Pupils are equal, round, and reactive to light.   Cardiovascular:      Rate and Rhythm: Normal rate and regular rhythm.      Heart sounds: Normal heart sounds. No murmur heard.    No gallop.   Pulmonary:      Effort: Pulmonary effort is normal. No respiratory distress.      Breath sounds: Normal breath sounds. No wheezing or rales.   Abdominal:      General: Abdomen is flat. Bowel sounds are  normal. There is no distension.      Palpations: There is no mass.      Tenderness: There is no abdominal tenderness.   Musculoskeletal:         General: No swelling.      Right lower leg: No edema.      Left lower leg: No edema.      Comments: Extremities feel cold, difficulty in feeling pulses   Neurological:      Mental Status: She is easily aroused. She is lethargic, disoriented and confused.      Comments: Unable to do full neurologic assessment at this time         Labs:   Recent Labs     02/03/23  1340 02/03/23  1712   WBC 18.3* 17.5*   RBC 5.33 5.22   HEMOGLOBIN 16.5* 16.0   HEMATOCRIT 50.3* 49.3*   MCV 94.4 94.4   MCH 31.0 30.7   RDW 45.7 45.6   PLATELETCT 447* 375   MPV 10.9 10.7   NEUTSPOLYS 81.70* 80.40*   LYMPHOCYTES 4.90* 4.70*   MONOCYTES 12.10 12.90   EOSINOPHILS 0.00 0.10   BASOPHILS 0.30 0.50     Recent Labs     02/03/23 2230 02/04/23  0238 02/04/23  0600   SODIUM 135 138 137   POTASSIUM 3.3* 3.2* 3.8   CHLORIDE 104 110 110   CO2 6* 12* 11*   GLUCOSE 459* 259* 257*   BUN 40* 37* 38*     Recent Labs     02/03/23  1340 02/03/23  1708 02/03/23  1827 02/03/23 2230 02/04/23  0238 02/04/23  0600   ALBUMIN 4.0 3.2  --   --   --   --    TBILIRUBIN 0.2 <0.2  --   --   --   --    ALKPHOSPHAT 204* 180*  --   --   --   --    TOTPROTEIN 7.8 6.4  --   --   --   --    ALTSGPT 11 9  --   --   --   --    ASTSGOT 11* 12  --   --   --   --    CREATININE 1.89* 1.69*   < > 1.65* 1.46* 1.32    < > = values in this interval not displayed.       Imaging:   CT-SOFT TISSUE NECK WITH   Final Result      1.  LEFT facial subcutaneous fluid collection measuring 3.5 cm.  Abscess is a possibility however there is no significant surrounding inflammatory changes   2.  Small LEFT anterior maxillary tooth abscess.   3.  Heterogeneous LEFT thyroid nodule measuring 18 mm.  Recommend follow-up nonemergent ultrasound.      US-RUQ   Final Result      1.  Prior cholecystectomy.   2.  Mild biliary dilation.   3.  Echogenic shadowing focus  in the melania hepatis, possibly stone in the common hepatic duct.   4.  Limited evaluation of pancreas.      DX-CHEST-PORTABLE (1 VIEW)   Final Result      No acute cardiopulmonary disease.      [id number]-M/O    (Results Pending)   US-EXTREMITY VENOUS LOWER BILAT    (Results Pending)   [id number]-E/O    (Results Pending)       Assessment and Plan:  * DKA, type 2, not at goal (HCC)- (present on admission)  Assessment & Plan  Possible triggers/factors: L oral abscess, amphetamine use, non-compliance  ICU admission, cardiac monitoring  Continue to optimize intravascular volume with additional IVF bolus  Continue DKA protocol with insulin drip at 0.05 units/kg/hr  Every hour Accu-Cheks, every 4 hour BMP, mag, phos  Goal Magnesium: >2, Phosphorus: 2, K >4  Will transition to sliding scale insulin when anion gap <12, CO2 > 17    Acute pancreatitis- (present on admission)  Assessment & Plan  Acute pancreatitis on presentation. Unlikely EtOH related, possible hypertriglyceridemia, biliary stone   Lipid panel  RUQ US: Echogenic shadowing focus in the melania hepatis, possibly stone in the common hepatic duct. Awaiting abdominal MRI.   MRCP  IVF  Analgesia    Oral abscess- (present on admission)  Assessment & Plan  Left peridontal abcess extending outward. OMFS saw patient, will wait until more stable for possible surgery. Considering Monday 2/6. Abscess seen on neck CT.   ERP attempted a small I&D with 18 ga needle  Continue Unasyn 3g IV q6h      Hyponatremia- (present on admission)  Assessment & Plan  Resolved.    MAICOL (acute kidney injury) (HCC)- (present on admission)  Assessment & Plan  Likely due to dehydration, Creatinine is downtrending  IVF  Renal dose meds, avoid nephrotoxins  Strict I/Os  Follow renal function    Amphetamine use- (present on admission)  Assessment & Plan  Past amphetamine use, positive on UDS.   Does report possible amphetamine use in recent days.  Cessation counseling    DVT (deep venous thrombosis)  (HCC)- (present on admission)  Assessment & Plan  Lower extremities cold, concern for repeat DVT  On Xarelto at home, will continue for now  DVT lower extremity ultrasound        DVT:  Antibiotics:

## 2023-02-04 NOTE — PROGRESS NOTES
4 Eyes Skin Assessment Completed by CARIE Piña and CARIE Tyler.    Head Swelling, Redness, and Edema, Pt has abscess on Left cheek  Ears WDL  Nose WDL  Mouth Redness  Neck WDL  Breast/Chest WDL  Shoulder Blades WDL  Spine WDL  (R) Arm/Elbow/Hand WDL  (L) Arm/Elbow/Hand WDL  Abdomen WDL  Groin Redness, Excoriation, and Rash  Scrotum/Coccyx/Buttocks Redness and Non-Blanching  (R) Leg Redness and Swelling  (L) Leg Redness and Rash  (R) Heel/Foot/Toe Redness and Blanching  (L) Heel/Foot/Toe Redness and Blanching          Devices In Places ECG, Blood Pressure Cuff, Pulse Ox, and Vivas      Interventions In Place Sacral Mepilex, Heel Float Boots, and Pillows    Possible Skin Injury Yes    Pictures Uploaded Into Epic Yes  Wound Consult Placed Yes  RN Wound Prevention Protocol Ordered Yes

## 2023-02-05 LAB
ALBUMIN SERPL BCP-MCNC: 2.5 G/DL (ref 3.2–4.9)
ALP SERPL-CCNC: 108 U/L (ref 30–99)
ALT SERPL-CCNC: 8 U/L (ref 2–50)
ANION GAP SERPL CALC-SCNC: 13 MMOL/L (ref 7–16)
ANION GAP SERPL CALC-SCNC: 23 MMOL/L (ref 7–16)
ANION GAP SERPL CALC-SCNC: 25 MMOL/L (ref 7–16)
ANION GAP SERPL CALC-SCNC: 6 MMOL/L (ref 7–16)
ANION GAP SERPL CALC-SCNC: 9 MMOL/L (ref 7–16)
AST SERPL-CCNC: 10 U/L (ref 12–45)
B-OH-BUTYR SERPL-MCNC: >8 MMOL/L (ref 0.02–0.27)
BACTERIA UR CULT: NORMAL
BASE EXCESS BLDA CALC-SCNC: -16 MMOL/L (ref -4–3)
BASOPHILS # BLD AUTO: 0.3 % (ref 0–1.8)
BASOPHILS # BLD: 0.03 K/UL (ref 0–0.12)
BILIRUB CONJ SERPL-MCNC: <0.2 MG/DL (ref 0.1–0.5)
BILIRUB INDIRECT SERPL-MCNC: ABNORMAL MG/DL (ref 0–1)
BILIRUB SERPL-MCNC: 0.2 MG/DL (ref 0.1–1.5)
BODY TEMPERATURE: ABNORMAL DEGREES
BUN SERPL-MCNC: 13 MG/DL (ref 8–22)
BUN SERPL-MCNC: 18 MG/DL (ref 8–22)
BUN SERPL-MCNC: 20 MG/DL (ref 8–22)
BUN SERPL-MCNC: 23 MG/DL (ref 8–22)
BUN SERPL-MCNC: 25 MG/DL (ref 8–22)
CALCIUM SERPL-MCNC: 10.1 MG/DL (ref 8.5–10.5)
CALCIUM SERPL-MCNC: 9.3 MG/DL (ref 8.5–10.5)
CALCIUM SERPL-MCNC: 9.5 MG/DL (ref 8.5–10.5)
CALCIUM SERPL-MCNC: 9.9 MG/DL (ref 8.5–10.5)
CALCIUM SERPL-MCNC: 9.9 MG/DL (ref 8.5–10.5)
CHLORIDE SERPL-SCNC: 110 MMOL/L (ref 96–112)
CHLORIDE SERPL-SCNC: 114 MMOL/L (ref 96–112)
CHLORIDE SERPL-SCNC: 116 MMOL/L (ref 96–112)
CHLORIDE SERPL-SCNC: 117 MMOL/L (ref 96–112)
CHLORIDE SERPL-SCNC: 118 MMOL/L (ref 96–112)
CO2 BLDA-SCNC: <10 MMOL/L (ref 20–33)
CO2 SERPL-SCNC: 14 MMOL/L (ref 20–33)
CO2 SERPL-SCNC: 19 MMOL/L (ref 20–33)
CO2 SERPL-SCNC: 22 MMOL/L (ref 20–33)
CO2 SERPL-SCNC: 5 MMOL/L (ref 20–33)
CO2 SERPL-SCNC: 6 MMOL/L (ref 20–33)
CREAT SERPL-MCNC: 0.64 MG/DL (ref 0.5–1.4)
CREAT SERPL-MCNC: 0.81 MG/DL (ref 0.5–1.4)
CREAT SERPL-MCNC: 0.88 MG/DL (ref 0.5–1.4)
CREAT SERPL-MCNC: 0.96 MG/DL (ref 0.5–1.4)
CREAT SERPL-MCNC: 1 MG/DL (ref 0.5–1.4)
EOSINOPHIL # BLD AUTO: 0.12 K/UL (ref 0–0.51)
EOSINOPHIL NFR BLD: 1.4 % (ref 0–6.9)
ERYTHROCYTE [DISTWIDTH] IN BLOOD BY AUTOMATED COUNT: 47.6 FL (ref 35.9–50)
GFR SERPLBLD CREATININE-BSD FMLA CKD-EPI: 100 ML/MIN/1.73 M 2
GFR SERPLBLD CREATININE-BSD FMLA CKD-EPI: 64 ML/MIN/1.73 M 2
GFR SERPLBLD CREATININE-BSD FMLA CKD-EPI: 67 ML/MIN/1.73 M 2
GFR SERPLBLD CREATININE-BSD FMLA CKD-EPI: 75 ML/MIN/1.73 M 2
GFR SERPLBLD CREATININE-BSD FMLA CKD-EPI: 82 ML/MIN/1.73 M 2
GLUCOSE BLD STRIP.AUTO-MCNC: 179 MG/DL (ref 65–99)
GLUCOSE BLD STRIP.AUTO-MCNC: 179 MG/DL (ref 65–99)
GLUCOSE BLD STRIP.AUTO-MCNC: 192 MG/DL (ref 65–99)
GLUCOSE BLD STRIP.AUTO-MCNC: 197 MG/DL (ref 65–99)
GLUCOSE BLD STRIP.AUTO-MCNC: 199 MG/DL (ref 65–99)
GLUCOSE BLD STRIP.AUTO-MCNC: 206 MG/DL (ref 65–99)
GLUCOSE BLD STRIP.AUTO-MCNC: 215 MG/DL (ref 65–99)
GLUCOSE BLD STRIP.AUTO-MCNC: 219 MG/DL (ref 65–99)
GLUCOSE BLD STRIP.AUTO-MCNC: 230 MG/DL (ref 65–99)
GLUCOSE BLD STRIP.AUTO-MCNC: 244 MG/DL (ref 65–99)
GLUCOSE BLD STRIP.AUTO-MCNC: 245 MG/DL (ref 65–99)
GLUCOSE BLD STRIP.AUTO-MCNC: 270 MG/DL (ref 65–99)
GLUCOSE BLD STRIP.AUTO-MCNC: 293 MG/DL (ref 65–99)
GLUCOSE BLD STRIP.AUTO-MCNC: 320 MG/DL (ref 65–99)
GLUCOSE BLD STRIP.AUTO-MCNC: 337 MG/DL (ref 65–99)
GLUCOSE SERPL-MCNC: 184 MG/DL (ref 65–99)
GLUCOSE SERPL-MCNC: 226 MG/DL (ref 65–99)
GLUCOSE SERPL-MCNC: 272 MG/DL (ref 65–99)
GLUCOSE SERPL-MCNC: 351 MG/DL (ref 65–99)
GLUCOSE SERPL-MCNC: 367 MG/DL (ref 65–99)
HCO3 BLDA-SCNC: 7.7 MMOL/L (ref 17–25)
HCT VFR BLD AUTO: 39.6 % (ref 37–47)
HGB BLD-MCNC: 13.3 G/DL (ref 12–16)
IMM GRANULOCYTES # BLD AUTO: 0.04 K/UL (ref 0–0.11)
IMM GRANULOCYTES NFR BLD AUTO: 0.5 % (ref 0–0.9)
LACTATE BLD-SCNC: 0.6 MMOL/L (ref 0.5–2)
LACTATE SERPL-SCNC: 0.9 MMOL/L (ref 0.5–2)
LIPASE SERPL-CCNC: 79 U/L (ref 11–82)
LYMPHOCYTES # BLD AUTO: 1.18 K/UL (ref 1–4.8)
LYMPHOCYTES NFR BLD: 13.5 % (ref 22–41)
MAGNESIUM SERPL-MCNC: 1.8 MG/DL (ref 1.5–2.5)
MAGNESIUM SERPL-MCNC: 1.9 MG/DL (ref 1.5–2.5)
MCH RBC QN AUTO: 30.6 PG (ref 27–33)
MCHC RBC AUTO-ENTMCNC: 33.6 G/DL (ref 33.6–35)
MCV RBC AUTO: 91.2 FL (ref 81.4–97.8)
MONOCYTES # BLD AUTO: 0.79 K/UL (ref 0–0.85)
MONOCYTES NFR BLD AUTO: 9 % (ref 0–13.4)
NEUTROPHILS # BLD AUTO: 6.58 K/UL (ref 2–7.15)
NEUTROPHILS NFR BLD: 75.3 % (ref 44–72)
NRBC # BLD AUTO: 0.02 K/UL
NRBC BLD-RTO: 0.2 /100 WBC
PCO2 BLDA: 15.6 MMHG (ref 26–37)
PCO2 TEMP ADJ BLDA: 15.6 MMHG (ref 26–37)
PH BLDA: 7.3 [PH] (ref 7.4–7.5)
PH TEMP ADJ BLDA: 7.3 [PH] (ref 7.4–7.5)
PHOSPHATE SERPL-MCNC: 1.1 MG/DL (ref 2.5–4.5)
PHOSPHATE SERPL-MCNC: 1.6 MG/DL (ref 2.5–4.5)
PLATELET # BLD AUTO: 284 K/UL (ref 164–446)
PMV BLD AUTO: 10.5 FL (ref 9–12.9)
PO2 BLDA: 95 MMHG (ref 64–87)
PO2 TEMP ADJ BLDA: 95 MMHG (ref 64–87)
POTASSIUM SERPL-SCNC: 3.4 MMOL/L (ref 3.6–5.5)
POTASSIUM SERPL-SCNC: 3.4 MMOL/L (ref 3.6–5.5)
POTASSIUM SERPL-SCNC: 3.5 MMOL/L (ref 3.6–5.5)
POTASSIUM SERPL-SCNC: 3.5 MMOL/L (ref 3.6–5.5)
POTASSIUM SERPL-SCNC: 4 MMOL/L (ref 3.6–5.5)
PROT SERPL-MCNC: 5.4 G/DL (ref 6–8.2)
RBC # BLD AUTO: 4.34 M/UL (ref 4.2–5.4)
SAO2 % BLDA: 97 % (ref 93–99)
SIGNIFICANT IND 70042: NORMAL
SITE SITE: NORMAL
SODIUM SERPL-SCNC: 140 MMOL/L (ref 135–145)
SODIUM SERPL-SCNC: 143 MMOL/L (ref 135–145)
SODIUM SERPL-SCNC: 143 MMOL/L (ref 135–145)
SODIUM SERPL-SCNC: 145 MMOL/L (ref 135–145)
SODIUM SERPL-SCNC: 146 MMOL/L (ref 135–145)
SOURCE SOURCE: NORMAL
SPECIMEN DRAWN FROM PATIENT: ABNORMAL
WBC # BLD AUTO: 8.7 K/UL (ref 4.8–10.8)

## 2023-02-05 PROCEDURE — A9270 NON-COVERED ITEM OR SERVICE: HCPCS | Performed by: STUDENT IN AN ORGANIZED HEALTH CARE EDUCATION/TRAINING PROGRAM

## 2023-02-05 PROCEDURE — 700102 HCHG RX REV CODE 250 W/ 637 OVERRIDE(OP): Performed by: STUDENT IN AN ORGANIZED HEALTH CARE EDUCATION/TRAINING PROGRAM

## 2023-02-05 PROCEDURE — 80048 BASIC METABOLIC PNL TOTAL CA: CPT

## 2023-02-05 PROCEDURE — 700102 HCHG RX REV CODE 250 W/ 637 OVERRIDE(OP): Performed by: EMERGENCY MEDICINE

## 2023-02-05 PROCEDURE — 80076 HEPATIC FUNCTION PANEL: CPT

## 2023-02-05 PROCEDURE — 770022 HCHG ROOM/CARE - ICU (200)

## 2023-02-05 PROCEDURE — 85025 COMPLETE CBC W/AUTO DIFF WBC: CPT

## 2023-02-05 PROCEDURE — 700111 HCHG RX REV CODE 636 W/ 250 OVERRIDE (IP): Performed by: STUDENT IN AN ORGANIZED HEALTH CARE EDUCATION/TRAINING PROGRAM

## 2023-02-05 PROCEDURE — 82010 KETONE BODYS QUAN: CPT

## 2023-02-05 PROCEDURE — 700105 HCHG RX REV CODE 258: Performed by: STUDENT IN AN ORGANIZED HEALTH CARE EDUCATION/TRAINING PROGRAM

## 2023-02-05 PROCEDURE — 700105 HCHG RX REV CODE 258

## 2023-02-05 PROCEDURE — A9270 NON-COVERED ITEM OR SERVICE: HCPCS | Performed by: EMERGENCY MEDICINE

## 2023-02-05 PROCEDURE — 83735 ASSAY OF MAGNESIUM: CPT | Mod: 91

## 2023-02-05 PROCEDURE — 700111 HCHG RX REV CODE 636 W/ 250 OVERRIDE (IP): Performed by: EMERGENCY MEDICINE

## 2023-02-05 PROCEDURE — 700101 HCHG RX REV CODE 250: Performed by: EMERGENCY MEDICINE

## 2023-02-05 PROCEDURE — 700105 HCHG RX REV CODE 258: Performed by: INTERNAL MEDICINE

## 2023-02-05 PROCEDURE — 83690 ASSAY OF LIPASE: CPT

## 2023-02-05 PROCEDURE — 83605 ASSAY OF LACTIC ACID: CPT

## 2023-02-05 PROCEDURE — 36600 WITHDRAWAL OF ARTERIAL BLOOD: CPT

## 2023-02-05 PROCEDURE — 700102 HCHG RX REV CODE 250 W/ 637 OVERRIDE(OP): Performed by: INTERNAL MEDICINE

## 2023-02-05 PROCEDURE — 700101 HCHG RX REV CODE 250: Performed by: STUDENT IN AN ORGANIZED HEALTH CARE EDUCATION/TRAINING PROGRAM

## 2023-02-05 PROCEDURE — 700111 HCHG RX REV CODE 636 W/ 250 OVERRIDE (IP): Performed by: INTERNAL MEDICINE

## 2023-02-05 PROCEDURE — 82803 BLOOD GASES ANY COMBINATION: CPT

## 2023-02-05 PROCEDURE — 82962 GLUCOSE BLOOD TEST: CPT | Mod: 91

## 2023-02-05 PROCEDURE — 99291 CRITICAL CARE FIRST HOUR: CPT | Performed by: EMERGENCY MEDICINE

## 2023-02-05 PROCEDURE — 84100 ASSAY OF PHOSPHORUS: CPT | Mod: 91

## 2023-02-05 PROCEDURE — A9270 NON-COVERED ITEM OR SERVICE: HCPCS | Performed by: INTERNAL MEDICINE

## 2023-02-05 RX ORDER — DEXTROSE, SODIUM CHLORIDE, SODIUM LACTATE, POTASSIUM CHLORIDE, AND CALCIUM CHLORIDE 5; .6; .31; .03; .02 G/100ML; G/100ML; G/100ML; G/100ML; G/100ML
INJECTION, SOLUTION INTRAVENOUS CONTINUOUS
Status: DISCONTINUED | OUTPATIENT
Start: 2023-02-05 | End: 2023-02-06

## 2023-02-05 RX ORDER — SODIUM CHLORIDE 9 MG/ML
2000 INJECTION, SOLUTION INTRAVENOUS ONCE
Status: DISCONTINUED | OUTPATIENT
Start: 2023-02-05 | End: 2023-02-05

## 2023-02-05 RX ORDER — SODIUM CHLORIDE, SODIUM LACTATE, POTASSIUM CHLORIDE, AND CALCIUM CHLORIDE .6; .31; .03; .02 G/100ML; G/100ML; G/100ML; G/100ML
2000 INJECTION, SOLUTION INTRAVENOUS ONCE
Status: DISCONTINUED | OUTPATIENT
Start: 2023-02-05 | End: 2023-02-05

## 2023-02-05 RX ORDER — POTASSIUM CHLORIDE 7.45 MG/ML
10 INJECTION INTRAVENOUS
Status: DISCONTINUED | OUTPATIENT
Start: 2023-02-05 | End: 2023-02-05

## 2023-02-05 RX ORDER — POTASSIUM CHLORIDE 7.45 MG/ML
10 INJECTION INTRAVENOUS
Status: COMPLETED | OUTPATIENT
Start: 2023-02-05 | End: 2023-02-05

## 2023-02-05 RX ORDER — MAGNESIUM SULFATE HEPTAHYDRATE 40 MG/ML
4 INJECTION, SOLUTION INTRAVENOUS
Status: COMPLETED | OUTPATIENT
Start: 2023-02-05 | End: 2023-02-05

## 2023-02-05 RX ORDER — DEXTROSE AND SODIUM CHLORIDE 10; .45 G/100ML; G/100ML
INJECTION, SOLUTION INTRAVENOUS CONTINUOUS
Status: ACTIVE | OUTPATIENT
Start: 2023-02-05 | End: 2023-02-06

## 2023-02-05 RX ORDER — SODIUM CHLORIDE, SODIUM LACTATE, POTASSIUM CHLORIDE, CALCIUM CHLORIDE 600; 310; 30; 20 MG/100ML; MG/100ML; MG/100ML; MG/100ML
INJECTION, SOLUTION INTRAVENOUS CONTINUOUS
Status: DISCONTINUED | OUTPATIENT
Start: 2023-02-05 | End: 2023-02-05

## 2023-02-05 RX ORDER — MAGNESIUM SULFATE HEPTAHYDRATE 40 MG/ML
2 INJECTION, SOLUTION INTRAVENOUS
Status: COMPLETED | OUTPATIENT
Start: 2023-02-05 | End: 2023-02-05

## 2023-02-05 RX ORDER — POTASSIUM CHLORIDE 7.45 MG/ML
10 INJECTION INTRAVENOUS
Status: COMPLETED | OUTPATIENT
Start: 2023-02-06 | End: 2023-02-06

## 2023-02-05 RX ORDER — SODIUM CHLORIDE, SODIUM LACTATE, POTASSIUM CHLORIDE, AND CALCIUM CHLORIDE .6; .31; .03; .02 G/100ML; G/100ML; G/100ML; G/100ML
250 INJECTION, SOLUTION INTRAVENOUS ONCE
Status: COMPLETED | OUTPATIENT
Start: 2023-02-05 | End: 2023-02-05

## 2023-02-05 RX ORDER — SODIUM BICARBONATE IN D5W 150/1000ML
PLASTIC BAG, INJECTION (ML) INTRAVENOUS CONTINUOUS
Status: DISPENSED | OUTPATIENT
Start: 2023-02-05 | End: 2023-02-06

## 2023-02-05 RX ADMIN — DEXTROSE AND SODIUM CHLORIDE: 10; .45 INJECTION, SOLUTION INTRAVENOUS at 23:40

## 2023-02-05 RX ADMIN — SODIUM CHLORIDE, SODIUM LACTATE, POTASSIUM CHLORIDE, CALCIUM CHLORIDE AND DEXTROSE MONOHYDRATE: 5; 600; 310; 30; 20 INJECTION, SOLUTION INTRAVENOUS at 10:09

## 2023-02-05 RX ADMIN — SODIUM CHLORIDE, SODIUM LACTATE, POTASSIUM CHLORIDE, CALCIUM CHLORIDE AND DEXTROSE MONOHYDRATE: 5; 600; 310; 30; 20 INJECTION, SOLUTION INTRAVENOUS at 17:24

## 2023-02-05 RX ADMIN — SODIUM CHLORIDE, POTASSIUM CHLORIDE, SODIUM LACTATE AND CALCIUM CHLORIDE 250 ML: 600; 310; 30; 20 INJECTION, SOLUTION INTRAVENOUS at 07:07

## 2023-02-05 RX ADMIN — ACETAMINOPHEN 1000 MG: 500 TABLET, FILM COATED ORAL at 00:31

## 2023-02-05 RX ADMIN — NYSTATIN: 100000 POWDER TOPICAL at 17:28

## 2023-02-05 RX ADMIN — AMPICILLIN AND SULBACTAM 3 G: 1; 2 INJECTION, POWDER, FOR SOLUTION INTRAMUSCULAR; INTRAVENOUS at 17:29

## 2023-02-05 RX ADMIN — AMPICILLIN AND SULBACTAM 3 G: 1; 2 INJECTION, POWDER, FOR SOLUTION INTRAMUSCULAR; INTRAVENOUS at 11:43

## 2023-02-05 RX ADMIN — INSULIN LISPRO 6 UNITS: 100 INJECTION, SOLUTION INTRAVENOUS; SUBCUTANEOUS at 05:20

## 2023-02-05 RX ADMIN — OXYCODONE HYDROCHLORIDE 10 MG: 10 TABLET ORAL at 00:35

## 2023-02-05 RX ADMIN — SODIUM CHLORIDE, POTASSIUM CHLORIDE, SODIUM LACTATE AND CALCIUM CHLORIDE: 600; 310; 30; 20 INJECTION, SOLUTION INTRAVENOUS at 07:51

## 2023-02-05 RX ADMIN — SODIUM BICARBONATE: 84 INJECTION, SOLUTION INTRAVENOUS at 18:06

## 2023-02-05 RX ADMIN — NYSTATIN: 100000 POWDER TOPICAL at 05:05

## 2023-02-05 RX ADMIN — AMPICILLIN AND SULBACTAM 3 G: 1; 2 INJECTION, POWDER, FOR SOLUTION INTRAMUSCULAR; INTRAVENOUS at 00:32

## 2023-02-05 RX ADMIN — MAGNESIUM SULFATE HEPTAHYDRATE 2 G: 40 INJECTION, SOLUTION INTRAVENOUS at 13:39

## 2023-02-05 RX ADMIN — SENNOSIDES AND DOCUSATE SODIUM 2 TABLET: 50; 8.6 TABLET ORAL at 05:05

## 2023-02-05 RX ADMIN — POTASSIUM CHLORIDE 10 MEQ: 7.46 INJECTION, SOLUTION INTRAVENOUS at 20:33

## 2023-02-05 RX ADMIN — POTASSIUM CHLORIDE 10 MEQ: 7.46 INJECTION, SOLUTION INTRAVENOUS at 21:36

## 2023-02-05 RX ADMIN — POTASSIUM CHLORIDE 10 MEQ: 7.46 INJECTION, SOLUTION INTRAVENOUS at 18:28

## 2023-02-05 RX ADMIN — POTASSIUM PHOSPHATE, MONOBASIC AND POTASSIUM PHOSPHATE, DIBASIC 30 MMOL: 224; 236 INJECTION, SOLUTION, CONCENTRATE INTRAVENOUS at 13:01

## 2023-02-05 RX ADMIN — HYDROMORPHONE HYDROCHLORIDE 0.5 MG: 1 INJECTION, SOLUTION INTRAMUSCULAR; INTRAVENOUS; SUBCUTANEOUS at 05:42

## 2023-02-05 RX ADMIN — SODIUM CHLORIDE 5 UNITS/HR: 9 INJECTION, SOLUTION INTRAVENOUS at 07:10

## 2023-02-05 RX ADMIN — POTASSIUM CHLORIDE 10 MEQ: 7.46 INJECTION, SOLUTION INTRAVENOUS at 08:13

## 2023-02-05 RX ADMIN — AMPICILLIN AND SULBACTAM 3 G: 1; 2 INJECTION, POWDER, FOR SOLUTION INTRAMUSCULAR; INTRAVENOUS at 05:04

## 2023-02-05 RX ADMIN — AMPICILLIN AND SULBACTAM 3 G: 1; 2 INJECTION, POWDER, FOR SOLUTION INTRAMUSCULAR; INTRAVENOUS at 23:34

## 2023-02-05 RX ADMIN — POTASSIUM CHLORIDE 10 MEQ: 7.46 INJECTION, SOLUTION INTRAVENOUS at 09:05

## 2023-02-05 RX ADMIN — POTASSIUM CHLORIDE 10 MEQ: 7.46 INJECTION, SOLUTION INTRAVENOUS at 19:07

## 2023-02-05 RX ADMIN — SODIUM CHLORIDE 9 UNITS/HR: 9 INJECTION, SOLUTION INTRAVENOUS at 18:11

## 2023-02-05 RX ADMIN — ACETAMINOPHEN 1000 MG: 500 TABLET, FILM COATED ORAL at 05:05

## 2023-02-05 ASSESSMENT — PAIN DESCRIPTION - PAIN TYPE
TYPE: ACUTE PAIN

## 2023-02-05 NOTE — CARE PLAN
The patient is Watcher - Medium risk of patient condition declining or worsening    Shift Goals  Clinical Goals: control blood sugar, replace electrolytes, keep hemodynamically stable, pain control  Patient Goals: IVA  Family Goals: IVA    Progress made toward(s) clinical / shift goals:    Off insulin gtt-transitioned to HR subcutaneously  Replaced electrolytes per protocol  VSS, more awake but still unable to verbalize pain, moans at intervals.    Problem: Knowledge Deficit - Standard  Goal: Patient and family/care givers will demonstrate understanding of plan of care, disease process/condition, diagnostic tests and medications  Outcome: Progressing     Problem: Hemodynamics  Goal: Patient's hemodynamics, fluid balance and neurologic status will be stable or improve  Outcome: Progressing     Problem: Fluid Volume  Goal: Fluid volume balance will be maintained  Outcome: Progressing     Problem: Urinary - Renal Perfusion  Goal: Ability to achieve and maintain adequate renal perfusion and functioning will improve  Outcome: Progressing     Problem: Respiratory  Goal: Patient will achieve/maintain optimum respiratory ventilation and gas exchange  Outcome: Progressing     Problem: Physical Regulation  Goal: Diagnostic test results will improve  Outcome: Progressing

## 2023-02-05 NOTE — PROGRESS NOTES
"Critical Care Medicine Attending Note  (see full Resident note in EPIC)    I have seen and examined the patient today.  I have reviewed the medical record, laboratory data, imaging, and all relevant studies.  I have discussed the assessment and plan of care with the Internal Medicine Resident and agree with their note and plan as documented.       \"62-year-old female with past medical history of diabetes, left lower extremity DVT who presented to the ER with altered mental status.  She was found to be in DKA and septic due to a left facial/odontogenic abscess.  Her abscess had a small I&D performed in the ER and OMFS was consulted.  The patient was given 3 L of crystalloid resuscitation and ICU admission was requested for treatment of her DKA.     Other pertinent labs to return in the ER include a lipase of >3,000 and a UDS + for amphetamines.\" H+P 2/3/23    Hospital Course  No notes on file    Interval events/data by system:  Transition to subcu insulin yesterday evening, overnight recurrent hyperglycemia with elevated anion gap metabolic acidosis  Insulin drip restarted, fluid bolus      Neuro:   Continues to respond to voice, not really following commands    CV:  Heart rate 110-120  -130s  No pressors    Resp:   Room air  SPO2 97 to 99%  CXR negative    ABG 7.3/16/95/97%    GI: N.p.o.  No BM    I/O: +1613  UOP: 2 L (100/hr)    Tmax: Afebrile    Heme:   WBC 8.7 from 17    Abx: Unasyn for oral abscess  Blood cultures negative to date    Endo: On insulin drip    LDA: PIV's, Vivas catheter  SUP: Not indicated  VTE: Xarelto for prior DVT  Diet: N.p.o.      Physical Exam:   Awake and interactive though when I try to talk to her she basically continues to just moan and cry as she did talk to me briefly and follows commands intermittently  Head neck exam shows left facial fullness in the area of her abscess there still appears to be purulent drainage internally  Cardiac exam unremarkable, she has no murmur she is " well-perfused  Lungs are clear to auscultation bilaterally, with some diminished breath sounds in the bases  Abdomen obese and soft tender to palpation in the upper abdomen but no surgical abdomen or peritonitis  Neuro exam is nonfocal, however patient continues to have intermittent emotional lability without real clear verbal communication is difficult to get a sense of.  She does not appear to be responding to external stimuli, she is moving all of her extremities attends to my voice and answers questions occasionally    Diagnostics:   Sodium 143, K3.5, chloride 114, bicarbonate 6, anion gap 23, creatinine 1.96, blood glucose 351  WBC 8.7 hemoglobin 13.3, platelets 284    A1c is 13.7  Cultures strep angina gnosis from oral purulence, negative blood cultures  LFTs continue to be unremarkable with downtrending of her alkaline phosphatase    MRCP not performed but pending    Albumin 2.5  Lactate 0.9    Lipase now 79    Assessment/Plan:  Diabetic ketoacidosis  Elevated anion gap metabolic acidosis  Hypokalemia  Left facial abscess from odontogenic source  Acute pancreatitis  Pseudohyponatremia  Nonoliguric MAICOL  Amphetamine use disorder  History of DVT on Xarelto    Patient did not tolerate transition to subcu insulin overnight with rapid recurrence of hyperglycemia and diabetic ketoacidosis, with metabolic acidosis and rapid increasing blood glucose.  This is in the context of no oral intake.  Her recent A1c is 13 and may be that she needs a higher long-acting dose and serial fingersticks following transition, also considering whether or not her infection is under control, her white blood cell count is normal she is afebrile she has normal hemodynamics and no sepsis physiology.  -Reinitiated on DKA protocol with insulin infusion  -Appears euvolemic  -Continue to supplement potassium to avoid hypokalemia or nephrogenic DI    At this point patient has a mixed metabolic acidosis and non-anion gap hyperchloremic metabolic  acidosis including significant contribution from a hyperchloremic nongap acidosis.  Her bicarbonate of 6 is certainly evidence of ketoacidosis and her beta hydroxybutyrate is elevated again, however some contribution of hyperchloremic acidosis to her overall picture.  -Will consider isotonic bicarbonate once gap is closed    Continue on Unasyn antibiotics given her persistent DKA and failure to improve    Lipase is within normal limits her abdomen is  to palpation, I think that we need to trial some orals.  Would like to see her mental status improve prior to doing so.  LFTs remain reassuring  If unable to pass bedside swallow will attempt feeding tube placement.    Discussed patient condition and risk of morbidity and/or mortality with MDR, bedside nursing, patient.     The patient remains critically ill with a guarded prognosis.     Critical care time = 35 minutes in directly providing and coordinating critical care and extensive data review. No time overlap and excludes procedures.

## 2023-02-05 NOTE — THERAPY
Speech Language Therapy Contact Note    Patient Name: Fannie Driver  Age:  62 y.o., Sex:  female  Medical Record #: [id number]  Today's Date: 2/5/2023 02/05/23 0857   Treatment Variance   Reason For Missed Therapy Medical - Patient Unarousable   Interdisciplinary Plan of Care Collaboration   IDT Collaboration with  Nursing   Collaboration Comments Orders received for a clinical swallow evaluation. Per RN, pt is lethargic and not appropriate at this time. SLP to address order when as able.

## 2023-02-05 NOTE — CARE PLAN
Problem: Fall Risk  Goal: Patient will remain free from falls  Outcome: Progressing   The patient is Stable - Low risk of patient condition declining or worsening    Shift Goals  Clinical Goals: keep blood glucose controlled, pain control, rest  Patient Goals: bronson  Family Goals: no family present    Progress made toward(s) clinical / shift goals:  Pain control, controlled blood glucose

## 2023-02-05 NOTE — CARE PLAN
Problem: Pain - Standard  Goal: Alleviation of pain or a reduction in pain to the patient’s comfort goal  Outcome: Progressing   The patient is Watcher - Medium risk of patient condition declining or worsening    Shift Goals  Clinical Goals: ckeep blood sugar controlled, pain control, rest  Patient Goals: bronson  Family Goals: no family present    Progress made toward(s) clinical / shift goals:  pain control, rest

## 2023-02-05 NOTE — PROGRESS NOTES
Appears Mrs Driver was transitioned off DKA protocol last evening, around 5-6pm.  Unfortunately this morning her gap has reopened and she is again acidotic.    I am putting her back on DKA protocol and insulin infusion.    Possibly may have failed transition due to:  -not eating  - transitioned too early  - underlying trigger not addressed    Will pass on to day team    Keon Rodriguez M.D.

## 2023-02-06 ENCOUNTER — ANESTHESIA (OUTPATIENT)
Dept: SURGERY | Facility: MEDICAL CENTER | Age: 63
DRG: 853 | End: 2023-02-06
Payer: MEDICARE

## 2023-02-06 ENCOUNTER — ANESTHESIA EVENT (OUTPATIENT)
Dept: SURGERY | Facility: MEDICAL CENTER | Age: 63
DRG: 853 | End: 2023-02-06
Payer: MEDICARE

## 2023-02-06 PROBLEM — G93.40 ACUTE ENCEPHALOPATHY: Status: ACTIVE | Noted: 2023-02-06

## 2023-02-06 PROBLEM — E87.8 ELECTROLYTE DISTURBANCE: Status: ACTIVE | Noted: 2023-02-06

## 2023-02-06 LAB
ANION GAP SERPL CALC-SCNC: 7 MMOL/L (ref 7–16)
ANION GAP SERPL CALC-SCNC: 8 MMOL/L (ref 7–16)
ANION GAP SERPL CALC-SCNC: 8 MMOL/L (ref 7–16)
ANION GAP SERPL CALC-SCNC: 9 MMOL/L (ref 7–16)
B-OH-BUTYR SERPL-MCNC: 0.01 MMOL/L (ref 0.02–0.27)
BACTERIA WND AEROBE CULT: ABNORMAL
BUN SERPL-MCNC: 10 MG/DL (ref 8–22)
BUN SERPL-MCNC: 11 MG/DL (ref 8–22)
BUN SERPL-MCNC: 9 MG/DL (ref 8–22)
BUN SERPL-MCNC: 9 MG/DL (ref 8–22)
CALCIUM SERPL-MCNC: 8.1 MG/DL (ref 8.5–10.5)
CALCIUM SERPL-MCNC: 8.7 MG/DL (ref 8.5–10.5)
CALCIUM SERPL-MCNC: 8.7 MG/DL (ref 8.5–10.5)
CALCIUM SERPL-MCNC: 9.1 MG/DL (ref 8.5–10.5)
CHLORIDE SERPL-SCNC: 109 MMOL/L (ref 96–112)
CHLORIDE SERPL-SCNC: 113 MMOL/L (ref 96–112)
CHLORIDE SERPL-SCNC: 114 MMOL/L (ref 96–112)
CHLORIDE SERPL-SCNC: 116 MMOL/L (ref 96–112)
CO2 SERPL-SCNC: 21 MMOL/L (ref 20–33)
CO2 SERPL-SCNC: 22 MMOL/L (ref 20–33)
CO2 SERPL-SCNC: 23 MMOL/L (ref 20–33)
CO2 SERPL-SCNC: 24 MMOL/L (ref 20–33)
CREAT SERPL-MCNC: 0.42 MG/DL (ref 0.5–1.4)
CREAT SERPL-MCNC: 0.45 MG/DL (ref 0.5–1.4)
CREAT SERPL-MCNC: 0.47 MG/DL (ref 0.5–1.4)
CREAT SERPL-MCNC: 0.47 MG/DL (ref 0.5–1.4)
ERYTHROCYTE [DISTWIDTH] IN BLOOD BY AUTOMATED COUNT: 45.7 FL (ref 35.9–50)
FUNGUS SPEC FUNGUS STN: NORMAL
GFR SERPLBLD CREATININE-BSD FMLA CKD-EPI: 108 ML/MIN/1.73 M 2
GFR SERPLBLD CREATININE-BSD FMLA CKD-EPI: 108 ML/MIN/1.73 M 2
GFR SERPLBLD CREATININE-BSD FMLA CKD-EPI: 109 ML/MIN/1.73 M 2
GFR SERPLBLD CREATININE-BSD FMLA CKD-EPI: 111 ML/MIN/1.73 M 2
GLUCOSE BLD STRIP.AUTO-MCNC: 105 MG/DL (ref 65–99)
GLUCOSE BLD STRIP.AUTO-MCNC: 118 MG/DL (ref 65–99)
GLUCOSE BLD STRIP.AUTO-MCNC: 131 MG/DL (ref 65–99)
GLUCOSE BLD STRIP.AUTO-MCNC: 133 MG/DL (ref 65–99)
GLUCOSE BLD STRIP.AUTO-MCNC: 138 MG/DL (ref 65–99)
GLUCOSE BLD STRIP.AUTO-MCNC: 153 MG/DL (ref 65–99)
GLUCOSE BLD STRIP.AUTO-MCNC: 158 MG/DL (ref 65–99)
GLUCOSE BLD STRIP.AUTO-MCNC: 180 MG/DL (ref 65–99)
GLUCOSE BLD STRIP.AUTO-MCNC: 187 MG/DL (ref 65–99)
GLUCOSE BLD STRIP.AUTO-MCNC: 189 MG/DL (ref 65–99)
GLUCOSE BLD STRIP.AUTO-MCNC: 79 MG/DL (ref 65–99)
GLUCOSE SERPL-MCNC: 145 MG/DL (ref 65–99)
GLUCOSE SERPL-MCNC: 191 MG/DL (ref 65–99)
GLUCOSE SERPL-MCNC: 216 MG/DL (ref 65–99)
GLUCOSE SERPL-MCNC: 84 MG/DL (ref 65–99)
GRAM STN SPEC: ABNORMAL
GRAM STN SPEC: NORMAL
HCT VFR BLD AUTO: 32.7 % (ref 37–47)
HGB BLD-MCNC: 11.5 G/DL (ref 12–16)
MAGNESIUM SERPL-MCNC: 1.5 MG/DL (ref 1.5–2.5)
MAGNESIUM SERPL-MCNC: 2 MG/DL (ref 1.5–2.5)
MAGNESIUM SERPL-MCNC: 2.6 MG/DL (ref 1.5–2.5)
MCH RBC QN AUTO: 30.5 PG (ref 27–33)
MCHC RBC AUTO-ENTMCNC: 35.2 G/DL (ref 33.6–35)
MCV RBC AUTO: 86.7 FL (ref 81.4–97.8)
PHOSPHATE SERPL-MCNC: 0.9 MG/DL (ref 2.5–4.5)
PHOSPHATE SERPL-MCNC: 3 MG/DL (ref 2.5–4.5)
PHOSPHATE SERPL-MCNC: 3.1 MG/DL (ref 2.5–4.5)
PLATELET # BLD AUTO: 254 K/UL (ref 164–446)
PMV BLD AUTO: 10.1 FL (ref 9–12.9)
POTASSIUM SERPL-SCNC: 3.3 MMOL/L (ref 3.6–5.5)
POTASSIUM SERPL-SCNC: 3.5 MMOL/L (ref 3.6–5.5)
POTASSIUM SERPL-SCNC: 3.5 MMOL/L (ref 3.6–5.5)
POTASSIUM SERPL-SCNC: 3.7 MMOL/L (ref 3.6–5.5)
RBC # BLD AUTO: 3.77 M/UL (ref 4.2–5.4)
SIGNIFICANT IND 70042: ABNORMAL
SIGNIFICANT IND 70042: NORMAL
SIGNIFICANT IND 70042: NORMAL
SITE SITE: ABNORMAL
SITE SITE: NORMAL
SITE SITE: NORMAL
SODIUM SERPL-SCNC: 139 MMOL/L (ref 135–145)
SODIUM SERPL-SCNC: 143 MMOL/L (ref 135–145)
SODIUM SERPL-SCNC: 146 MMOL/L (ref 135–145)
SODIUM SERPL-SCNC: 146 MMOL/L (ref 135–145)
SOURCE SOURCE: ABNORMAL
SOURCE SOURCE: NORMAL
SOURCE SOURCE: NORMAL
WBC # BLD AUTO: 8.8 K/UL (ref 4.8–10.8)

## 2023-02-06 PROCEDURE — 700102 HCHG RX REV CODE 250 W/ 637 OVERRIDE(OP)

## 2023-02-06 PROCEDURE — 160035 HCHG PACU - 1ST 60 MINS PHASE I: Performed by: DENTIST

## 2023-02-06 PROCEDURE — 306015 LOCK STAT FOLEY: Performed by: DENTIST

## 2023-02-06 PROCEDURE — 99140 ANES COMP EMERGENCY COND: CPT | Performed by: ANESTHESIOLOGY

## 2023-02-06 PROCEDURE — 700105 HCHG RX REV CODE 258: Performed by: ANESTHESIOLOGY

## 2023-02-06 PROCEDURE — 87102 FUNGUS ISOLATION CULTURE: CPT

## 2023-02-06 PROCEDURE — 160002 HCHG RECOVERY MINUTES (STAT): Performed by: DENTIST

## 2023-02-06 PROCEDURE — 82962 GLUCOSE BLOOD TEST: CPT

## 2023-02-06 PROCEDURE — 700101 HCHG RX REV CODE 250: Performed by: ANESTHESIOLOGY

## 2023-02-06 PROCEDURE — 700105 HCHG RX REV CODE 258: Performed by: INTERNAL MEDICINE

## 2023-02-06 PROCEDURE — 700102 HCHG RX REV CODE 250 W/ 637 OVERRIDE(OP): Performed by: STUDENT IN AN ORGANIZED HEALTH CARE EDUCATION/TRAINING PROGRAM

## 2023-02-06 PROCEDURE — 160027 HCHG SURGERY MINUTES - 1ST 30 MINS LEVEL 2: Performed by: DENTIST

## 2023-02-06 PROCEDURE — 83735 ASSAY OF MAGNESIUM: CPT

## 2023-02-06 PROCEDURE — 700111 HCHG RX REV CODE 636 W/ 250 OVERRIDE (IP): Performed by: INTERNAL MEDICINE

## 2023-02-06 PROCEDURE — 0CDXXZ1 EXTRACTION OF LOWER TOOTH, MULTIPLE, EXTERNAL APPROACH: ICD-10-PCS | Performed by: DENTIST

## 2023-02-06 PROCEDURE — 700111 HCHG RX REV CODE 636 W/ 250 OVERRIDE (IP): Performed by: ANESTHESIOLOGY

## 2023-02-06 PROCEDURE — 87106 FUNGI IDENTIFICATION YEAST: CPT

## 2023-02-06 PROCEDURE — 87076 CULTURE ANAEROBE IDENT EACH: CPT

## 2023-02-06 PROCEDURE — 87075 CULTR BACTERIA EXCEPT BLOOD: CPT

## 2023-02-06 PROCEDURE — 700101 HCHG RX REV CODE 250: Performed by: GENERAL PRACTICE

## 2023-02-06 PROCEDURE — 700105 HCHG RX REV CODE 258: Performed by: STUDENT IN AN ORGANIZED HEALTH CARE EDUCATION/TRAINING PROGRAM

## 2023-02-06 PROCEDURE — 92610 EVALUATE SWALLOWING FUNCTION: CPT

## 2023-02-06 PROCEDURE — 87070 CULTURE OTHR SPECIMN AEROBIC: CPT

## 2023-02-06 PROCEDURE — 0NBV0ZZ EXCISION OF LEFT MANDIBLE, OPEN APPROACH: ICD-10-PCS | Performed by: DENTIST

## 2023-02-06 PROCEDURE — 99291 CRITICAL CARE FIRST HOUR: CPT | Performed by: INTERNAL MEDICINE

## 2023-02-06 PROCEDURE — 700101 HCHG RX REV CODE 250: Performed by: DENTIST

## 2023-02-06 PROCEDURE — 160038 HCHG SURGERY MINUTES - EA ADDL 1 MIN LEVEL 2: Performed by: DENTIST

## 2023-02-06 PROCEDURE — 87077 CULTURE AEROBIC IDENTIFY: CPT | Mod: 91

## 2023-02-06 PROCEDURE — 160009 HCHG ANES TIME/MIN: Performed by: DENTIST

## 2023-02-06 PROCEDURE — 87205 SMEAR GRAM STAIN: CPT

## 2023-02-06 PROCEDURE — 700111 HCHG RX REV CODE 636 W/ 250 OVERRIDE (IP): Performed by: DENTIST

## 2023-02-06 PROCEDURE — 770022 HCHG ROOM/CARE - ICU (200)

## 2023-02-06 PROCEDURE — 700102 HCHG RX REV CODE 250 W/ 637 OVERRIDE(OP): Performed by: INTERNAL MEDICINE

## 2023-02-06 PROCEDURE — 82010 KETONE BODYS QUAN: CPT

## 2023-02-06 PROCEDURE — 160048 HCHG OR STATISTICAL LEVEL 1-5: Performed by: DENTIST

## 2023-02-06 PROCEDURE — 85027 COMPLETE CBC AUTOMATED: CPT

## 2023-02-06 PROCEDURE — 84100 ASSAY OF PHOSPHORUS: CPT

## 2023-02-06 PROCEDURE — A9270 NON-COVERED ITEM OR SERVICE: HCPCS | Performed by: INTERNAL MEDICINE

## 2023-02-06 PROCEDURE — 700111 HCHG RX REV CODE 636 W/ 250 OVERRIDE (IP): Performed by: EMERGENCY MEDICINE

## 2023-02-06 PROCEDURE — A9270 NON-COVERED ITEM OR SERVICE: HCPCS | Performed by: STUDENT IN AN ORGANIZED HEALTH CARE EDUCATION/TRAINING PROGRAM

## 2023-02-06 PROCEDURE — 80048 BASIC METABOLIC PNL TOTAL CA: CPT

## 2023-02-06 PROCEDURE — 700105 HCHG RX REV CODE 258: Performed by: DENTIST

## 2023-02-06 PROCEDURE — 00170 ANES INTRAORAL PX NOS: CPT | Performed by: ANESTHESIOLOGY

## 2023-02-06 RX ORDER — MAGNESIUM SULFATE HEPTAHYDRATE 40 MG/ML
2 INJECTION, SOLUTION INTRAVENOUS ONCE
Status: COMPLETED | OUTPATIENT
Start: 2023-02-06 | End: 2023-02-06

## 2023-02-06 RX ORDER — SODIUM CHLORIDE, SODIUM LACTATE, POTASSIUM CHLORIDE, CALCIUM CHLORIDE 600; 310; 30; 20 MG/100ML; MG/100ML; MG/100ML; MG/100ML
INJECTION, SOLUTION INTRAVENOUS
Status: DISCONTINUED | OUTPATIENT
Start: 2023-02-06 | End: 2023-02-06 | Stop reason: SURG

## 2023-02-06 RX ORDER — LIDOCAINE HYDROCHLORIDE AND EPINEPHRINE 10; 10 MG/ML; UG/ML
INJECTION, SOLUTION INFILTRATION; PERINEURAL
Status: DISCONTINUED | OUTPATIENT
Start: 2023-02-06 | End: 2023-02-06 | Stop reason: HOSPADM

## 2023-02-06 RX ORDER — MIDAZOLAM HYDROCHLORIDE 1 MG/ML
1 INJECTION INTRAMUSCULAR; INTRAVENOUS
Status: DISCONTINUED | OUTPATIENT
Start: 2023-02-06 | End: 2023-02-06 | Stop reason: HOSPADM

## 2023-02-06 RX ORDER — ONDANSETRON 2 MG/ML
INJECTION INTRAMUSCULAR; INTRAVENOUS PRN
Status: DISCONTINUED | OUTPATIENT
Start: 2023-02-06 | End: 2023-02-06 | Stop reason: SURG

## 2023-02-06 RX ORDER — SODIUM CHLORIDE, SODIUM LACTATE, POTASSIUM CHLORIDE, CALCIUM CHLORIDE 600; 310; 30; 20 MG/100ML; MG/100ML; MG/100ML; MG/100ML
INJECTION, SOLUTION INTRAVENOUS CONTINUOUS
Status: DISCONTINUED | OUTPATIENT
Start: 2023-02-06 | End: 2023-02-06 | Stop reason: HOSPADM

## 2023-02-06 RX ORDER — LIDOCAINE HYDROCHLORIDE 20 MG/ML
INJECTION, SOLUTION EPIDURAL; INFILTRATION; INTRACAUDAL; PERINEURAL PRN
Status: DISCONTINUED | OUTPATIENT
Start: 2023-02-06 | End: 2023-02-06 | Stop reason: SURG

## 2023-02-06 RX ORDER — LIDOCAINE HYDROCHLORIDE 10 MG/ML
INJECTION, SOLUTION EPIDURAL; INFILTRATION; INTRACAUDAL; PERINEURAL
Status: DISPENSED
Start: 2023-02-06 | End: 2023-02-07

## 2023-02-06 RX ORDER — DIPHENHYDRAMINE HYDROCHLORIDE 50 MG/ML
12.5 INJECTION INTRAMUSCULAR; INTRAVENOUS
Status: DISCONTINUED | OUTPATIENT
Start: 2023-02-06 | End: 2023-02-06 | Stop reason: HOSPADM

## 2023-02-06 RX ORDER — ONDANSETRON 2 MG/ML
4 INJECTION INTRAMUSCULAR; INTRAVENOUS
Status: DISCONTINUED | OUTPATIENT
Start: 2023-02-06 | End: 2023-02-06 | Stop reason: HOSPADM

## 2023-02-06 RX ORDER — CEFAZOLIN SODIUM 1 G/3ML
INJECTION, POWDER, FOR SOLUTION INTRAMUSCULAR; INTRAVENOUS
Status: DISCONTINUED | OUTPATIENT
Start: 2023-02-06 | End: 2023-02-06 | Stop reason: HOSPADM

## 2023-02-06 RX ORDER — MEPERIDINE HYDROCHLORIDE 25 MG/ML
6.25 INJECTION INTRAMUSCULAR; INTRAVENOUS; SUBCUTANEOUS
Status: DISCONTINUED | OUTPATIENT
Start: 2023-02-06 | End: 2023-02-06 | Stop reason: HOSPADM

## 2023-02-06 RX ORDER — HYDRALAZINE HYDROCHLORIDE 20 MG/ML
5 INJECTION INTRAMUSCULAR; INTRAVENOUS
Status: DISCONTINUED | OUTPATIENT
Start: 2023-02-06 | End: 2023-02-06 | Stop reason: HOSPADM

## 2023-02-06 RX ORDER — OXYCODONE HCL 5 MG/5 ML
10 SOLUTION, ORAL ORAL
Status: DISCONTINUED | OUTPATIENT
Start: 2023-02-06 | End: 2023-02-06 | Stop reason: HOSPADM

## 2023-02-06 RX ORDER — OXYCODONE HCL 5 MG/5 ML
5 SOLUTION, ORAL ORAL
Status: DISCONTINUED | OUTPATIENT
Start: 2023-02-06 | End: 2023-02-06 | Stop reason: HOSPADM

## 2023-02-06 RX ORDER — EPINEPHRINE 1 MG/ML(1)
AMPUL (ML) INJECTION
Status: DISPENSED
Start: 2023-02-06 | End: 2023-02-07

## 2023-02-06 RX ORDER — CEFAZOLIN SODIUM 1 G/3ML
INJECTION, POWDER, FOR SOLUTION INTRAMUSCULAR; INTRAVENOUS
Status: DISPENSED
Start: 2023-02-06 | End: 2023-02-07

## 2023-02-06 RX ORDER — ROCURONIUM BROMIDE 10 MG/ML
INJECTION, SOLUTION INTRAVENOUS PRN
Status: DISCONTINUED | OUTPATIENT
Start: 2023-02-06 | End: 2023-02-06 | Stop reason: SURG

## 2023-02-06 RX ORDER — KETOROLAC TROMETHAMINE 30 MG/ML
INJECTION, SOLUTION INTRAMUSCULAR; INTRAVENOUS PRN
Status: DISCONTINUED | OUTPATIENT
Start: 2023-02-06 | End: 2023-02-06 | Stop reason: SURG

## 2023-02-06 RX ORDER — SODIUM CHLORIDE, SODIUM LACTATE, POTASSIUM CHLORIDE, CALCIUM CHLORIDE 600; 310; 30; 20 MG/100ML; MG/100ML; MG/100ML; MG/100ML
INJECTION, SOLUTION INTRAVENOUS CONTINUOUS
Status: ACTIVE | OUTPATIENT
Start: 2023-02-06 | End: 2023-02-06

## 2023-02-06 RX ORDER — POTASSIUM CHLORIDE 7.45 MG/ML
10 INJECTION INTRAVENOUS
Status: DISCONTINUED | OUTPATIENT
Start: 2023-02-06 | End: 2023-02-06

## 2023-02-06 RX ORDER — HALOPERIDOL 5 MG/ML
1 INJECTION INTRAMUSCULAR
Status: DISCONTINUED | OUTPATIENT
Start: 2023-02-06 | End: 2023-02-06 | Stop reason: HOSPADM

## 2023-02-06 RX ORDER — LABETALOL HYDROCHLORIDE 5 MG/ML
5 INJECTION, SOLUTION INTRAVENOUS
Status: DISCONTINUED | OUTPATIENT
Start: 2023-02-06 | End: 2023-02-06 | Stop reason: HOSPADM

## 2023-02-06 RX ORDER — INSULIN LISPRO 100 [IU]/ML
3-14 INJECTION, SOLUTION INTRAVENOUS; SUBCUTANEOUS EVERY 6 HOURS
Status: DISCONTINUED | OUTPATIENT
Start: 2023-02-06 | End: 2023-02-06

## 2023-02-06 RX ORDER — SUCCINYLCHOLINE CHLORIDE 20 MG/ML
INJECTION INTRAMUSCULAR; INTRAVENOUS PRN
Status: DISCONTINUED | OUTPATIENT
Start: 2023-02-06 | End: 2023-02-06 | Stop reason: SURG

## 2023-02-06 RX ADMIN — SUCCINYLCHOLINE CHLORIDE 160 MG: 20 INJECTION, SOLUTION INTRAMUSCULAR; INTRAVENOUS; PARENTERAL at 15:43

## 2023-02-06 RX ADMIN — ROCURONIUM BROMIDE 5 MG: 10 INJECTION, SOLUTION INTRAVENOUS at 15:43

## 2023-02-06 RX ADMIN — NYSTATIN: 100000 POWDER TOPICAL at 06:00

## 2023-02-06 RX ADMIN — POTASSIUM CHLORIDE 10 MEQ: 7.46 INJECTION, SOLUTION INTRAVENOUS at 03:33

## 2023-02-06 RX ADMIN — RIVAROXABAN 20 MG: 20 TABLET, FILM COATED ORAL at 18:00

## 2023-02-06 RX ADMIN — INSULIN GLARGINE-YFGN 30 UNITS: 100 INJECTION, SOLUTION SUBCUTANEOUS at 09:31

## 2023-02-06 RX ADMIN — SODIUM CHLORIDE, POTASSIUM CHLORIDE, SODIUM LACTATE AND CALCIUM CHLORIDE: 600; 310; 30; 20 INJECTION, SOLUTION INTRAVENOUS at 15:35

## 2023-02-06 RX ADMIN — MAGNESIUM SULFATE HEPTAHYDRATE 2 G: 40 INJECTION, SOLUTION INTRAVENOUS at 10:22

## 2023-02-06 RX ADMIN — INSULIN HUMAN 4 UNITS: 100 INJECTION, SOLUTION PARENTERAL at 15:01

## 2023-02-06 RX ADMIN — MAGNESIUM SULFATE HEPTAHYDRATE 2 G: 40 INJECTION, SOLUTION INTRAVENOUS at 08:13

## 2023-02-06 RX ADMIN — POTASSIUM CHLORIDE 10 MEQ: 7.46 INJECTION, SOLUTION INTRAVENOUS at 00:20

## 2023-02-06 RX ADMIN — ONDANSETRON 4 MG: 2 INJECTION INTRAMUSCULAR; INTRAVENOUS at 15:43

## 2023-02-06 RX ADMIN — POTASSIUM PHOSPHATE, MONOBASIC AND POTASSIUM PHOSPHATE, DIBASIC 30 MMOL: 224; 236 INJECTION, SOLUTION, CONCENTRATE INTRAVENOUS at 08:06

## 2023-02-06 RX ADMIN — SODIUM CHLORIDE, POTASSIUM CHLORIDE, SODIUM LACTATE AND CALCIUM CHLORIDE: 600; 310; 30; 20 INJECTION, SOLUTION INTRAVENOUS at 14:19

## 2023-02-06 RX ADMIN — FENTANYL CITRATE 100 MCG: 50 INJECTION, SOLUTION INTRAMUSCULAR; INTRAVENOUS at 15:35

## 2023-02-06 RX ADMIN — KETOROLAC TROMETHAMINE 15 MG: 30 INJECTION, SOLUTION INTRAMUSCULAR at 16:15

## 2023-02-06 RX ADMIN — SENNOSIDES AND DOCUSATE SODIUM 2 TABLET: 50; 8.6 TABLET ORAL at 17:39

## 2023-02-06 RX ADMIN — AMPICILLIN AND SULBACTAM 3 G: 1; 2 INJECTION, POWDER, FOR SOLUTION INTRAMUSCULAR; INTRAVENOUS at 11:14

## 2023-02-06 RX ADMIN — SODIUM CHLORIDE 9 UNITS/HR: 9 INJECTION, SOLUTION INTRAVENOUS at 03:17

## 2023-02-06 RX ADMIN — POTASSIUM CHLORIDE 10 MEQ: 7.46 INJECTION, SOLUTION INTRAVENOUS at 01:25

## 2023-02-06 RX ADMIN — INSULIN HUMAN 4 UNITS: 100 INJECTION, SOLUTION PARENTERAL at 20:40

## 2023-02-06 RX ADMIN — PROPOFOL 200 MG: 10 INJECTION, EMULSION INTRAVENOUS at 15:43

## 2023-02-06 RX ADMIN — AMPICILLIN AND SULBACTAM 3 G: 1; 2 INJECTION, POWDER, FOR SOLUTION INTRAMUSCULAR; INTRAVENOUS at 17:40

## 2023-02-06 RX ADMIN — AMPICILLIN AND SULBACTAM 3 G: 1; 2 INJECTION, POWDER, FOR SOLUTION INTRAMUSCULAR; INTRAVENOUS at 04:30

## 2023-02-06 RX ADMIN — LIDOCAINE HYDROCHLORIDE 50 MG: 20 INJECTION, SOLUTION EPIDURAL; INFILTRATION; INTRACAUDAL at 15:43

## 2023-02-06 RX ADMIN — DEXTROSE AND SODIUM CHLORIDE: 10; .45 INJECTION, SOLUTION INTRAVENOUS at 09:16

## 2023-02-06 RX ADMIN — POTASSIUM CHLORIDE 10 MEQ: 7.46 INJECTION, SOLUTION INTRAVENOUS at 02:28

## 2023-02-06 RX ADMIN — INSULIN HUMAN 3 UNITS: 100 INJECTION, SOLUTION PARENTERAL at 11:18

## 2023-02-06 RX ADMIN — FENTANYL CITRATE 50 MCG: 50 INJECTION, SOLUTION INTRAMUSCULAR; INTRAVENOUS at 15:58

## 2023-02-06 ASSESSMENT — LIFESTYLE VARIABLES: SUBSTANCE_ABUSE: 1

## 2023-02-06 ASSESSMENT — ENCOUNTER SYMPTOMS
WEAKNESS: 0
NAUSEA: 0
BLURRED VISION: 0
BRUISES/BLEEDS EASILY: 0
DEPRESSION: 0
CHILLS: 0
SHORTNESS OF BREATH: 0
DOUBLE VISION: 0
COUGH: 0
DIZZINESS: 0
FLANK PAIN: 0
BACK PAIN: 0
VOMITING: 0
FOCAL WEAKNESS: 0
FEVER: 0
NECK PAIN: 0
HEADACHES: 0
SORE THROAT: 1
ABDOMINAL PAIN: 0

## 2023-02-06 ASSESSMENT — PAIN DESCRIPTION - PAIN TYPE
TYPE: ACUTE PAIN
TYPE: INTRACTABLE PAIN
TYPE: ACUTE PAIN

## 2023-02-06 ASSESSMENT — FIBROSIS 4 INDEX: FIB4 SCORE: 0.76

## 2023-02-06 NOTE — CARE PLAN
Problem: Knowledge Deficit - Standard  Goal: Patient and family/care givers will demonstrate understanding of plan of care, disease process/condition, diagnostic tests and medications  Outcome: Progressing     Problem: Hemodynamics  Goal: Patient's hemodynamics, fluid balance and neurologic status will be stable or improve  Outcome: Progressing     Problem: Fluid Volume  Goal: Fluid volume balance will be maintained  Outcome: Progressing     Problem: Urinary - Renal Perfusion  Goal: Ability to achieve and maintain adequate renal perfusion and functioning will improve  Outcome: Progressing     Problem: Respiratory  Goal: Patient will achieve/maintain optimum respiratory ventilation and gas exchange  Outcome: Progressing     Problem: Mechanical Ventilation  Goal: Safe management of artificial airway and ventilation  Outcome: Progressing  Goal: Successful weaning off mechanical ventilator, spontaneously maintains adequate gas exchange  Outcome: Progressing  Goal: Patient will be able to express needs and understand communication  Outcome: Progressing     Problem: Physical Regulation  Goal: Diagnostic test results will improve  Outcome: Progressing  Goal: Signs and symptoms of infection will decrease  Outcome: Progressing     Problem: Pain - Standard  Goal: Alleviation of pain or a reduction in pain to the patient’s comfort goal  Outcome: Progressing     Problem: Skin Integrity  Goal: Skin integrity is maintained or improved  Outcome: Progressing     Problem: Fall Risk  Goal: Patient will remain free from falls  Outcome: Progressing

## 2023-02-06 NOTE — ANESTHESIA PREPROCEDURE EVALUATION
Case: [id number] Date/Time: 02/06/23 1500    Procedure: I&D EXTRACTION OF TEETH (Mouth)    Anesthesia type: General    Location: [facility] ROOM 27 / SURGERY SAME DAY [facility]    Surgeons: Lore Dukes D.D.S.          Relevant Problems   CARDIAC   (positive) DVT (deep venous thrombosis) ([facility])         (positive) MAICOL (acute kidney injury) (HCC)      ENDO   (positive) DKA, type 2, not at goal (HCC)   Meth use recent, confused, DKA improved and electrolytes normalizing, off insulin ggt as of this morning    Physical Exam    Airway   Mallampati: II  TM distance: >3 FB  Neck ROM: full       Cardiovascular - normal exam  Rhythm: regular  Rate: normal  (-) murmur     Dental       Very poor dentition   Pulmonary - normal exam  Breath sounds clear to auscultation     Abdominal   (+) obese     Neurological - abnormal exam                 Anesthesia Plan    ASA 3- EMERGENT   ASA physical status 3 criteria: alcohol and/or substance dependence or abuseASA physical status emergent criteria: acute deteriorating condition due to infection    Plan - general       Airway plan will be ETT          Induction: intravenous    Postoperative Plan: Postoperative administration of opioids is intended.    Pertinent diagnostic labs and testing reviewed    Informed Consent:    Anesthetic plan and risks discussed with patient.    Use of blood products discussed with: patient whom consented to blood products.

## 2023-02-06 NOTE — PROGRESS NOTES
Critical Care Progress Note    Date of admission  2/3/2023    Chief Complaint  62 y.o. female admitted 2/3/2023 with acute encephalopathy and DKA    Hospital Course  Ms. Driver is a 62 year old lady with the past medical history significant for diabetes and left lower leg DVT who presented to the ER on 2/3/2023 with altered mental status.  The patient was found to be in DKA and septic due to a left facial/odontogenic abscess.  An I&D was performed in the ER and OMFS was consulted.  The patient received over 3 L of crystalloid resuscitation and then was admitted to the ICU.  Patient also was found to have a lipase greater than 3000 and the UDS positive for amphetamines.  2/4 - DKA protocols ongoing  2/5 - transitioned off DKA protocol-->AG opened and placed back on DKA protocol overnight    Interval Problem Update  Reviewed last 24 hour events:   - no events overnight   - more alert, drinking water   - a/ox2-3   - SR/ST 80-100s   - -160s   - afebrile   - Vivas   - insulin at 9 units/hr   - D10 at 125cc/hr   - on room air   - Xarelto   - Unasyn-->Strep in wound cultures   - Mg 1.5   - K 3.3   - phos 0.9   - -170s    Review of Systems  Review of Systems   Constitutional:  Positive for malaise/fatigue. Negative for chills and fever.   HENT:  Positive for sore throat. Negative for congestion.    Eyes:  Negative for blurred vision and double vision.   Respiratory:  Negative for cough and shortness of breath.    Cardiovascular:  Negative for chest pain and leg swelling.   Gastrointestinal:  Negative for abdominal pain, nausea and vomiting.   Genitourinary:  Negative for flank pain and hematuria.   Musculoskeletal:  Negative for back pain and neck pain.   Skin:  Negative for rash.   Neurological:  Negative for dizziness, focal weakness, weakness and headaches.   Endo/Heme/Allergies:  Does not bruise/bleed easily.   Psychiatric/Behavioral:  Positive for substance abuse. Negative for depression.       Vital Signs  for last 24 hours   Pulse:  [] 86  Resp:  [12-56] 12  BP: (110-163)/(53-86) 147/68  SpO2:  [95 %-99 %] 97 %    Hemodynamic parameters for last 24 hours       Respiratory Information for the last 24 hours       Physical Exam   Physical Exam  Vitals and nursing note reviewed.   Constitutional:       General: She is not in acute distress.     Appearance: She is ill-appearing. She is not toxic-appearing.      Comments: Appears older than stated age and chronically ill, quite disheveled    HENT:      Head: Normocephalic and atraumatic.      Right Ear: External ear normal.      Left Ear: External ear normal.      Nose: Nose normal. No rhinorrhea.      Mouth/Throat:      Mouth: Mucous membranes are moist.      Comments: Poor dentition, area of swelling to left lower jaw  Eyes:      General: No scleral icterus.     Conjunctiva/sclera: Conjunctivae normal.      Pupils: Pupils are equal, round, and reactive to light.   Cardiovascular:      Rate and Rhythm: Normal rate and regular rhythm.      Pulses: Normal pulses.      Heart sounds: Normal heart sounds. No murmur heard.  Pulmonary:      Breath sounds: Normal breath sounds. No wheezing.   Abdominal:      Palpations: Abdomen is soft.      Tenderness: There is no abdominal tenderness. There is no guarding or rebound.   Musculoskeletal:         General: Normal range of motion.      Cervical back: Normal range of motion and neck supple.      Right lower leg: No edema.      Left lower leg: No edema.   Lymphadenopathy:      Cervical: No cervical adenopathy.   Skin:     General: Skin is warm and dry.      Capillary Refill: Capillary refill takes 2 to 3 seconds.   Neurological:      General: No focal deficit present.      Mental Status: She is alert. She is disoriented.      Cranial Nerves: No cranial nerve deficit.      Sensory: No sensory deficit.      Motor: No weakness.      Comments: Improved orientation: knows she is in the hospital and her name   Psychiatric:       Comments: Calm/cooperative       Medications  Current Facility-Administered Medications   Medication Dose Route Frequency Provider Last Rate Last Admin    potassium phosphate IVPB 30 mmol in 500 mL D5W (premix)  30 mmol Intravenous Once Krish Aj Jr., M.D.        potassium chloride (KCL) ivpb 10 mEq  10 mEq Intravenous Q HOUR Krish Aj Jr., M.D.        magnesium sulfate IVPB premix 2 g  2 g Intravenous Once Fabby Bolanos M.D.        D5LR infusion   Intravenous Continuous Keon Rodriguez M.D.   Stopped at 02/06/23 0551    D10%-0.45% NaCl infusion   Intravenous Continuous Keon Rodriguez M.D. 125 mL/hr at 02/06/23 0551 Restarted at 02/06/23 0551    MD ALERT-PHARMACY TO CONSULT FOR DKA MONITORING 1 Each  1 Each Other PRN Keon Rodriguez M.D.        insulin regular (HumuLIN R) 100 Units in  mL Infusion for DKA  5 Units/hr Intravenous Continuous Keon Rodriguez M.D. 9 mL/hr at 02/06/23 0551 9 Units/hr at 02/06/23 0551    Adult DKA potassium(K+) replacement scale  1 Each Intravenous Q4HRS Maximo Eubanks M.D.   1 Each at 02/06/23 0600    acetaminophen (TYLENOL) tablet 1,000 mg  1,000 mg Oral Q6HRS Maximo Eubanks M.D.   1,000 mg at 02/05/23 0505    ampicillin/sulbactam (UNASYN) 3 g in  mL IVPB  3 g Intravenous Q6HRS Rj Shah Jr., D.O.   Stopped at 02/06/23 0500    Pharmacy Consult Request ...Pain Management Review 1 Each  1 Each Other PHARMACY TO DOSE Rj Shah Jr., D.O.        senna-docusate (PERICOLACE or SENOKOT S) 8.6-50 MG per tablet 2 Tablet  2 Tablet Oral BID Robbie Wellington M.D.   2 Tablet at 02/05/23 0505    And    polyethylene glycol/lytes (MIRALAX) PACKET 1 Packet  1 Packet Oral QDAY PRN Robbie Wellington M.D.        And    magnesium hydroxide (MILK OF MAGNESIA) suspension 30 mL  30 mL Oral QDAY PRN Robbie A Burningham, M.D.        And    bisacodyl (DULCOLAX) suppository 10 mg  10 mg Rectal QDAY PRN Robbie Wellington M.D.        Respiratory Therapy  Consult   Nebulization Continuous RT Robbie Wellington M.D.        rivaroxaban (XARELTO) tablet 20 mg  20 mg Oral PM MEAL Rj Shah Jr., D.O.   20 mg at 02/04/23 1736       Fluids    Intake/Output Summary (Last 24 hours) at 2/6/2023 0738  Last data filed at 2/6/2023 0600  Gross per 24 hour   Intake 359.9 ml   Output 2200 ml   Net -1840.1 ml       Laboratory  Recent Labs     02/05/23  0759   ISTATAPH 7.300*   ISTATAPCO2 15.6*   ISTATAPO2 95*   ISTATATCO2 <10*   AAEBDGH8LUN 97   ISTATARTHCO3 7.7*   ISTATARTBE -16*   ISTATTEMP 37.0 C   ISTATSPEC Arterial   ISTATAPHTC 7.300*   FIPADOMR4LG 95*         Recent Labs     02/05/23  1132 02/05/23  1630 02/05/23  2255 02/06/23  0415   SODIUM 143 145 146* 146*   POTASSIUM 3.5* 3.4* 3.4* 3.5*   CHLORIDE 116* 117* 118* 116*   CO2 14* 19* 22 23   BUN 20 18 13 11   CREATININE 0.88 0.81 0.64 0.45*   MAGNESIUM 1.9  --  1.8 1.5   PHOSPHORUS 1.6*  --  1.1* 0.9*   CALCIUM 9.9 9.9 9.3 8.7     Recent Labs     02/03/23  1340 02/03/23  1708 02/03/23  1827 02/05/23  0650 02/05/23  1132 02/05/23  1630 02/05/23  2255 02/06/23  0415   ALTSGPT 11 9  --  8  --   --   --   --    ASTSGOT 11* 12  --  10*  --   --   --   --    ALKPHOSPHAT 204* 180*  --  108*  --   --   --   --    TBILIRUBIN 0.2 <0.2  --  0.2  --   --   --   --    DBILIRUBIN  --   --   --  <0.2  --   --   --   --    LIPASE >3000*  --   --  79  --   --   --   --    GLUCOSE 764* 745*   < > 351*   < > 226* 184* 145*    < > = values in this interval not displayed.     Recent Labs     02/03/23  1340 02/03/23  1708 02/03/23  1712 02/05/23  0450 02/05/23  0650 02/06/23  0415   WBC 18.3*  --  17.5* 8.7  --  8.8   NEUTSPOLYS 81.70*  --  80.40* 75.30*  --   --    LYMPHOCYTES 4.90*  --  4.70* 13.50*  --   --    MONOCYTES 12.10  --  12.90 9.00  --   --    EOSINOPHILS 0.00  --  0.10 1.40  --   --    BASOPHILS 0.30  --  0.50 0.30  --   --    ASTSGOT 11* 12  --   --  10*  --    ALTSGPT 11 9  --   --  8  --    ALKPHOSPHAT 204* 180*  --   --   108*  --    TBILIRUBIN 0.2 <0.2  --   --  0.2  --      Recent Labs     02/03/23  1708 02/03/23  1712 02/05/23  0450 02/06/23  0415   RBC  --  5.22 4.34 3.77*   HEMOGLOBIN  --  16.0 13.3 11.5*   HEMATOCRIT  --  49.3* 39.6 32.7*   PLATELETCT  --  375 284 254   PROTHROMBTM 16.8*  --   --   --    INR 1.39*  --   --   --        Imaging  Lower extremity US:  No prior study is available for comparison.    No evidence of deep venous thrombosis.     Assessment/Plan  * DKA, type 2, not at goal (HCC)- (present on admission)  Assessment & Plan  Possible triggers/factors: L oral abscess, amphetamine use, non-compliance  Continue to optimize intravascular volume with additional IVF bolus  Continue DKA protocol with insulin drip at 0.05 units/kg/hr until AG closed and serum bicarb > 18  Every hour Accu-Cheks, every 4 hour BMP, mag, phos  Goal Magnesium: >2, Phosphorus: 2, K >4      Electrolyte disturbance- (present on admission)  Assessment & Plan  Suspect poor diet and malnutrition with refeeding syndrome suspected  Replete K to goal > 4  Replete Phos to goal > 2.5  Replete Mg to goal > 2    Acute encephalopathy- (present on admission)  Assessment & Plan  Suspected multifactorial:  Toxic and metabolic, UDS (+) for amphetamines and patient with DKA  Cont to correct all underlying metabolic disturbances  Monitor for withdrawal syndromes  Limit sedatives  Cont abx for abscess    Acute pancreatitis- (present on admission)  Assessment & Plan  Acute pancreatitis on presentation. Unlikely EtOH related, possible hypertriglyceridemia, biliary stone   Lipid panel  RUQ US: Echogenic shadowing focus in the melania hepatis, possibly stone in the common hepatic duct. Awaiting abdominal MRI.   MRCP  IVF  Analgesia    Oral abscess- (present on admission)  Assessment & Plan  Left peridontal abcess extending outward. OMFS saw patient, will wait until more stable for possible surgery. Considering Monday 2/6. Abscess seen on neck CT.   ERP attempted a  small I&D with 18 ga needle  Continue Unasyn 3g IV q6h  OMFS to take patient for I&D today      Hyponatremia- (present on admission)  Assessment & Plan  Resolved.    MAICOL (acute kidney injury) (HCC)- (present on admission)  Assessment & Plan  Likely due to dehydration-->resolved  S/p IVF resuscitation  Renal dose meds, avoid nephrotoxins  Strict I/Os  Follow renal function    Amphetamine use- (present on admission)  Assessment & Plan  Past amphetamine use, positive on UDS.   Does report possible amphetamine use in recent days.  Cessation counseling.    DVT (deep venous thrombosis) (HCC)- (present on admission)  Assessment & Plan  On Xarelto at home, will continue for now  Lower extremity ultrasound negative for DVT         VTE:  Lovenox  Ulcer: Not Indicated  Lines:  PIVs    I have performed a physical exam and reviewed and updated ROS and Plan today (2/6/2023). In review of yesterday's note (2/5/2023), there are no changes except as documented above.     Discussed patient condition and risk of morbidity and/or mortality with RN, RT, Pharmacy, , Charge nurse / hot rounds, Patient, and OMFS    The patient remains critically ill right after titration of insulin and D10 infusion with hourly glucose checks for ongoing DKA protocols.  I have assessed the reassess the patient's laboratories, mental status, and hemodynamics.  The patient remains at high risk of clinical deterioration, worsening vital organ dysfunction, and death without the above critical care interventions.    Critical care time = 45 minutes in directly providing and coordinating critical care and extensive data review.  No time overlap and excludes procedures.

## 2023-02-06 NOTE — OR NURSING
1500 BMP results told to Dr. Talamantes with anesthesia, orders to give sliding scale dose of insulin for glucose of 216. 4     1515 Consents signed by Dr. Dukes and Dr. Talamantes because of patient's confusion and no family.

## 2023-02-06 NOTE — OP REPORT
Operative Note         Patient: Fannie Driver      : 1960     Diagnosis:  1. Left buccal space abscess  2. Carious teeth       Procedure:  1. Incision and drainage of left buccal space abscess  2. Exraction of necessary teeth #19-21     Surgeon: Lore Dukes DDS, MD     Assistant: OR staff     Anesthesia: General via oral intubation      Fluids:see anesthesia report      EBL: minimal     Counts: correct x2    Specimens: aerobic,anaerobic,gram stain, fungal pus specimen from left buccal space      Complications: none     Drains: penrose left intraoral buccal space      Procedure Details:  Pt was seen in preoperative holding where consents were obtained via emergency, most recent notes were verified. Pt still confused, no family present. The pt was then taken back to the OR and transferred onto the OR table in the supine position. Various monitors were attached and deemed to be working properly, see anesthesia log for details. The pt was then induced to general anesthesia and orally intubated without complications. The tube was secured by the anesthesia team. The eyes were taped. The pt was then prepped and drapped in clean fashion.     Attention was first drawn intraorally where 1% lido with epi was injected in the left mandibular vestibule. A 15 blade was used to make an incision in the left posterior vestibule adjacent tooth #19 with sharp dissection down to periosteum. A hemostat was used to puncture through the buccal fascia to enter the buccal space from intraorally. Pus was encountered and cultured x2. The abscess was explored bluntly and then irrigated with saline infused with antibiotics. A penrose drain was placed into the cavity and sutured into place with 3-0 silk suture. A periosteal elevator was used to release the PDL fibers of teeth 19-21. A fissure was used under irrigation to remove buccal bone adjacent to the teeth. The teeth were luxated with dental elevators, and the affected teeth  were extracted with dental forceps. The sockets were curetted, no nerve structures appeared injured. The sites were irrigated with sterile saline infused with antibiotics. Gauze pressure was applied, a headwrap dressing was applied, and the patient was awakened from anesthesia and transferred to the recovery area in stable condition.

## 2023-02-06 NOTE — THERAPY
"Speech Language Pathology   Clinical Swallow Evaluation     Patient Name: Fannie Driver  AGE:  62 y.o., SEX:  female  Medical Record #: [id number]  Today's Date: 2023     Precautions  Precautions: Fall Risk, Swallow Precautions ( See Comments)    HPI: 63 y/o female presentoing with AMS. Found to be in DKA and septic due to a left facial/odontogenic abscess. No hx SLP in Epic    CMHx: DKA Type 2, MAICOL, oral abscess, pancreatitis  PMHx: DVT, amphetamine use    CXR 2/3:  \"No acute cardiopulmonary disease.\"    CT soft tissue neck w/ 2/3:  1.  LEFT facial subcutaneous fluid collection measuring 3.5 cm.  Abscess is a possibility however there is no significant surrounding inflammatory changes  2.  Small LEFT anterior maxillary tooth abscess.    From Oral Surgery consult :  \"Will require OR I&D once DKA stabilized\"    Level of Consciousness: Awake, Lethargic  Affect/Behavior: Anxious, Cooperative, Crying  Follows Directives: Yes  Orientation: Self, , General place  Hearing: Functional hearing  Vision: Functional vision      Prior Living Situation & Level of Function:  Reports RG/TN with dentures at baseline but was difficulty historian. Reports no hx of PNA, said \"I don't know\" when asked about hx reflux.       Oral Mechanism Evaluation  Facial Symmetry:  L facial fluid collection  Facial Sensation: Equal  Labial Observations: WFL  Lingual Observations: Midline  Dentition: Poor, Multiple carries, Scattered dentition  Comments: Reports upper denture, not at bedside      Voice  Quality: WFL  Resonance: WFL  Intensity: Soft, Appropriate with cues  Cough: WFL  Comments: No pain with cough      Current Method of Nutrition   NPO until cleared by speech pathology    RN reports pt did appropriately on bedside swallow screen      Subjective  Pt somewhat agreeable and cooperative with SLP evaluation tasks. \"I can't do it\" in reference to self-feeding.        Assessment  Positioning: Gutierrez's (60-90 degrees)  Bolus " "Administration: SLP and Patient  Oxygen Requirements: Room Air  Factor(s) Affecting Performance: Impaired endurance    Swallowing Trials  Thin Liquid (TN0): WFL  Liquidised (LQ3): WFL  Easy to Chew (EC7): offered, pt declined after attempting bite    Comments:  Appropriate oral bolus stripping and containment. Good lingual manipulation of bolus w/o residue appreciated across consistencies trialed, presumed total AP transit and effective bolus formation with soft solids. Impaired and incomplete bite of EC7; pt then stated \"No I can't\" and reported fear of pain with mastication. No overt s/sx of aspiration noted w/ single and sequential sips via straw of 6+oz of TN0. No increase in WOB, no cough/clear, no change in vocal quality. One swallow appreciated per bolus.     Pt was able to effectively self-feed but did fatigue and requested feeding A approx. detention through 4oz cup of LQ3.      Clinical Impressions  Pt presents with mild oral phase dysphagia and no s/sx of pharyngeal phases dysphagia. Oral phase dysphagia is likely acute on chronic d/t poor dentition, AMS, and oral abscess. Discussed IDDSI levels with pt who is agreeable to a MM5 diet to maximize safety, efficiency, and intake. SLP will follow as this does not appear to be pt's baseline and to monitor for changes in dysphagia with resolution of DKA and management of oral abscess.        Recommendations  1.  Minced and moist with thin liquids  2.  Instrumentation: None indicated at this time  3.  Swallowing Instructions & Precautions:   Supervision: 1:1 feeding with constant supervision, Encourage self-feeding  Positioning: Fully upright and midline during oral intake  Medication: As tolerated  Strategies: Small bites/sips, Slow rate of intake  Oral Care: Q4h  4.  SLP following      Plan    Recommend Speech Therapy 3 times per week until therapy goals are met for the following treatments:  Dysphagia Training and Patient / Family / Caregiver " "Education.    Discharge Recommendations: Anticipate that the patient will have no further speech therapy needs after discharge from the hospital       Objective   02/06/23 0994   Initial Contact Note    Initial Contact Note  Order Received and Verified, Speech Therapy Evaluation in Progress with Full Report to Follow.   Vitals   O2 Delivery Device None - Room Air   Pain 0 - 10 Group   Therapist Pain Assessment During Activity;Nurse Notified   Prior Level Of Function   Communication Within Functional Limits   Swallow Within Functional Limits   Dentition Poor Quality    Dentures Uppers  (not at bedside)   Patient's Primary Language English   Comments pt reports rg/tn diet at baseline but may be inconsistent historian   Dysphagia Rating   Nutritional Liquid Intake Rating Scale Non thickened beverages   Nutritional Food Intake Rating Scale Total oral diet with multiple consistencies but requiring special preparation or compensations   Patient / Family Goals   Patient / Family Goal #1 \"I can't do it right now\"   Short Term Goals   Short Term Goal # 1 Pt will consume MM/TN diet with 1:1 spv from staff and no overt s/sx of aspiration or worsening of lung status.   Education Group   Education Provided Dysphagia;Role of Speech Therapy   Dysphagia Patient Response Patient;Acceptance;Explanation;Verbal Demonstration;Action Demonstration;Reinforcement Needed   Role of SLP Patient Response Patient;Acceptance;Explanation;Verbal Demonstration;Action Demonstration;Reinforcement Needed   Problem List   Problem List Dysphagia   Interdisciplinary Plan of Care Collaboration   IDT Collaboration with  Nursing   Collaboration Comments RN updated. Swallow precautions hung.       "

## 2023-02-06 NOTE — PROGRESS NOTES
[facility] Progress Note      Admit Date: 2/3/2023    Resident(s): Jude Betancourt M.D.   Attending:  MARÍA LAYTON/ Dr. Bolanos    Patient ID:    Name:  Fannie Driver   YOB: 1960  Age:  62 y.o.  female   MRN:  [id number]    Hospital Course:  Fannie Driver is our 62 year old female patient with a past medical history of diabetes and left lower left DVT who presented 2/3/23 with AMS. Found to be in DKA; septic due to left facial/ondogenic abscess. I&D performed; OMFS consulted. Received 3L IVF; admitted to ICU. Lipase >3000, UDS (+) amphetamines.    2/4 - DKA protocols ongoing  2/5 - transitioned off DKA protocol-->AG opened and placed back on DKA protocol overnight    Consultants:  Critical Care  OMFS    Interval Events:  No acute overnight events  AAO x2-3  Afebrile  Transitioned  D10 @125cc  Unasyn for strep wound culture    Vitals Range last 24h:  Pulse:  [] 86  Resp:  [11-56] 18  BP: (110-163)/(55-86) 139/69  SpO2:  [94 %-97 %] 94 %      Intake/Output Summary (Last 24 hours) at 2/6/2023 1325  Last data filed at 2/6/2023 1300  Gross per 24 hour   Intake 459.9 ml   Output 2150 ml   Net -1690.1 ml        ROS   Constitutional:  Positive for malaise/fatigue. Negative for chills and fever.   HENT:  Positive for sore throat. Negative for congestion.    Eyes:  Negative for blurred vision and double vision.   Respiratory:  Negative for cough and shortness of breath.    Cardiovascular:  Negative for chest pain and leg swelling.   Gastrointestinal:  Negative for abdominal pain, nausea and vomiting.   Genitourinary:  Negative for flank pain and hematuria.   Musculoskeletal:  Negative for back pain and neck pain.   Skin:  Negative for rash.   Neurological:  Negative for dizziness, focal weakness, weakness and headaches.   Endo/Heme/Allergies:  Does not bruise/bleed easily.   Psychiatric/Behavioral:  Positive for substance abuse.    PHYSICAL EXAM:  Vitals:    02/06/23 1000 02/06/23 1100 02/06/23 1200  02/06/23 1300   BP: (!) 153/73 (!) 140/77 (!) 141/63 139/69   Pulse: 88 86 86 86   Resp: 13 (!) 11 13 18   Temp:       TempSrc:       SpO2: 97% 96% 94% 94%   Weight:       Height:        Body mass index is 32.25 kg/m².    O2 therapy: Pulse Oximetry: 94 %, O2 Delivery Device: None - Room Air    Date 02/06/23 0700 - 02/07/23 0659   Shift 9927-9310 8486-0361 4320-1549 24 Hour Total   INTAKE   I.V. 100   100     Magnesium Sulfate Volume 100   100   Shift Total 100   100   OUTPUT   Urine 650   650     Output (mL) (Urethral Catheter 16 Fr.) 650   650   Shift Total 650   650   NET -550   -550        Physical Exam  Constitutional:       General: She is not in acute distress.     Appearance: She is ill-appearing. She is not toxic-appearing.      Comments: Appears older than stated age and chronically ill, quite disheveled    HENT:      Head: Normocephalic and atraumatic.      Right Ear: External ear normal.      Left Ear: External ear normal.      Nose: Nose normal. No rhinorrhea.      Mouth/Throat:      Mouth: Mucous membranes are moist.      Comments: Poor dentition, area of swelling to left lower jaw  Cardiovascular:      Rate and Rhythm: Normal rate and regular rhythm.      Pulses: Normal pulses.      Heart sounds: Normal heart sounds. No murmur heard.  Pulmonary:      Breath sounds: Normal breath sounds. No wheezing.   Abdominal:      Palpations: Abdomen is soft.      Tenderness: There is no abdominal tenderness. There is no guarding or rebound.   Neurological:      General: No focal deficit present.      Mental Status: She is alert. She is disoriented.  Comments: Improved orientation: knows she is in the hospital and her name   Psychiatric:      Comments: Calm/cooperative     Recent Labs     02/05/23  0759   ISTATAPH 7.300*   ISTATAPCO2 15.6*   ISTATAPO2 95*   ISTATATCO2 <10*   [id number] 97   ISTATARTHCO3 7.7*   ISTATARTBE -16*   ISTATTEMP 37.0 C   ISTATSPEC Arterial   ISTATAPHTC 7.300*   DHJKANUU5HC 95*     Recent  Labs     02/05/23  1132 02/05/23  1630 02/05/23  2255 02/06/23  0415 02/06/23  0820   SODIUM 143   < > 146* 146* 146*   POTASSIUM 3.5*   < > 3.4* 3.5* 3.3*   CHLORIDE 116*   < > 118* 116* 114*   CO2 14*   < > 22 23 24   BUN 20   < > 13 11 10   CREATININE 0.88   < > 0.64 0.45* 0.47*   MAGNESIUM 1.9  --  1.8 1.5  --    PHOSPHORUS 1.6*  --  1.1* 0.9*  --    CALCIUM 9.9   < > 9.3 8.7 9.1    < > = values in this interval not displayed.     Recent Labs     02/03/23 1340 02/03/23 1708 02/03/23  1827 02/05/23  0650 02/05/23  1132 02/05/23  2255 02/06/23  0415 02/06/23  0820   ALTSGPT 11 9  --  8  --   --   --   --    ASTSGOT 11* 12  --  10*  --   --   --   --    ALKPHOSPHAT 204* 180*  --  108*  --   --   --   --    TBILIRUBIN 0.2 <0.2  --  0.2  --   --   --   --    DBILIRUBIN  --   --   --  <0.2  --   --   --   --    LIPASE >3000*  --   --  79  --   --   --   --    GLUCOSE 764* 745*   < > 351*   < > 184* 145* 84    < > = values in this interval not displayed.     Recent Labs     02/03/23 1708 02/03/23 1712 02/05/23  0450 02/06/23  0415   RBC  --  5.22 4.34 3.77*   HEMOGLOBIN  --  16.0 13.3 11.5*   HEMATOCRIT  --  49.3* 39.6 32.7*   PLATELETCT  --  375 284 254   PROTHROMBTM 16.8*  --   --   --    INR 1.39*  --   --   --      Recent Labs     02/03/23  1340 02/03/23  1708 02/03/23 1712 02/05/23 0450 02/05/23  0650 02/06/23  0415   WBC 18.3*  --  17.5* 8.7  --  8.8   NEUTSPOLYS 81.70*  --  80.40* 75.30*  --   --    LYMPHOCYTES 4.90*  --  4.70* 13.50*  --   --    MONOCYTES 12.10  --  12.90 9.00  --   --    EOSINOPHILS 0.00  --  0.10 1.40  --   --    BASOPHILS 0.30  --  0.50 0.30  --   --    ASTSGOT 11* 12  --   --  10*  --    ALTSGPT 11 9  --   --  8  --    ALKPHOSPHAT 204* 180*  --   --  108*  --    TBILIRUBIN 0.2 <0.2  --   --  0.2  --        Meds:   potassium phosphate  30 mmol Stopped (02/06/23 1406)    insulin GLARGINE  30 Units      insulin regular  3-14 Units      And    dextrose bolus  25 g      potassium phosphate  IVPB (CUSTOM)  30 mmol      lidocaine  0.5 mL      LR        acetaminophen  1,000 mg      ampicillin-sulbactam (UNASYN) IV  3 g Stopped (02/06/23 1144)    Pharmacy Consult Request  1 Each      senna-docusate  2 Tablet      And    polyethylene glycol/lytes  1 Packet      And    magnesium hydroxide  30 mL      And    bisacodyl  10 mg      Respiratory Therapy Consult        rivaroxaban  20 mg        Imaging:  US-EXTREMITY VENOUS LOWER BILAT   Final Result      CT-SOFT TISSUE NECK WITH   Final Result      1.  LEFT facial subcutaneous fluid collection measuring 3.5 cm.  Abscess is a possibility however there is no significant surrounding inflammatory changes   2.  Small LEFT anterior maxillary tooth abscess.   3.  Heterogeneous LEFT thyroid nodule measuring 18 mm.  Recommend follow-up nonemergent ultrasound.      US-RUQ   Final Result      1.  Prior cholecystectomy.   2.  Mild biliary dilation.   3.  Echogenic shadowing focus in the melania hepatis, possibly stone in the common hepatic duct.   4.  Limited evaluation of pancreas.      DX-CHEST-PORTABLE (1 VIEW)   Final Result      No acute cardiopulmonary disease.      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)- (present on admission)  Assessment & Plan  LEFT oral abscess, amphetamine use, non-compliance to medication regimen are possible triggers for episode of DKA  IVF fluid boluses as needed  Followed DKA protocol insulin gtt until AG closed and serum bicarb > 18  Transitioning to Lantus 20 AM; SSI  Q4 hour BMP, Mg, Phos  Goal Mg: >2, Phos > 2.5, K >4     Electrolyte disturbance- (present on admission)  Assessment & Plan  Suspecting poor diet/malnutrition  Possible refeeding syndrome  K goal >4; Phos goal >2.5; Mg goal >2     Acute encephalopathy- (present on admission)  Assessment & Plan  Multifactorial: Toxic metabolic, UDS (+) amphetamines, DKA  Correcting metabolic  disturbances  Monitoring for withdrawal  Limit sedatives  Cont Unasyn 3g Q6 hour for abscess     Acute pancreatitis- (present on admission)  Assessment & Plan  Acute pancreatitis on presentation  Unlikely EtOH related  Possible hyperTG, biliary stone   Lipid panel: chol 185//HDL 59/LDL 81  RUQ US: Echogenic shadowing focus in the melania hepatis, possibly stone in the common hepatic duct  IVF  Analgesia  Following up abdominal MRI     Oral abscess- (present on admission)  Assessment & Plan  Left peridontal abcess extending outward. Abscess seen on neck CT.   ERP attempted a small I&D with 18 ga needle  Continue Unasyn 3g IV q6h  OMFS to take patient for I&D today     Hyponatremia- (present on admission)  Assessment & Plan  Resolved     MAICOL (acute kidney injury) (HCC)- (present on admission)  Assessment & Plan  Likely due to dehydration-->resolved  S/p IVF resuscitation  Renal dose meds, avoid nephrotoxins  Strict I/Os  Follow renal function     Amphetamine use- (present on admission)  Assessment & Plan  Past amphetamine use, positive on UDS.   Does report possible amphetamine use in recent days.  Cessation counseling.     DVT (deep venous thrombosis) (HCC)- (present on admission)  Assessment & Plan  On Xarelto at home, will continue for now  Lower extremity ultrasound negative for DVT    VTE:  Lovenox  Ulcer: Not Indicated  Lines:  Juan Betancourt M.D.

## 2023-02-06 NOTE — HOSPITAL COURSE
Ms. Driver is a 62 year old lady with the past medical history significant for diabetes and left lower leg DVT who presented to the ER on 2/3/2023 with altered mental status.  The patient was found to be in DKA and septic due to a left facial/odontogenic abscess.  An I&D was performed in the ER and OMFS was consulted.  The patient received over 3 L of crystalloid resuscitation and then was admitted to the ICU.  Patient also was found to have a lipase greater than 3000 and the UDS positive for amphetamines.

## 2023-02-06 NOTE — CARE PLAN
Problem: Knowledge Deficit - Standard  Goal: Patient and family/care givers will demonstrate understanding of plan of care, disease process/condition, diagnostic tests and medications  2/6/2023 0640 by Bren Rajan R.N.  Outcome: Progressing  2/6/2023 0616 by Bren Rajan R.N.  Outcome: Progressing     Problem: Hemodynamics  Goal: Patient's hemodynamics, fluid balance and neurologic status will be stable or improve  2/6/2023 0640 by Bren Rajan R.N.  Outcome: Progressing  2/6/2023 0616 by Bren Rajan R.N.  Outcome: Progressing     Problem: Fluid Volume  Goal: Fluid volume balance will be maintained  2/6/2023 0640 by Bren Rajan R.N.  Outcome: Progressing  2/6/2023 0616 by Bren Rajan R.N.  Outcome: Progressing     Problem: Urinary - Renal Perfusion  Goal: Ability to achieve and maintain adequate renal perfusion and functioning will improve  2/6/2023 0640 by Bren Rajan R.N.  Outcome: Progressing  2/6/2023 0616 by Bren Rajan R.N.  Outcome: Progressing     Problem: Respiratory  Goal: Patient will achieve/maintain optimum respiratory ventilation and gas exchange  2/6/2023 0640 by Bren Rajan R.N.  Outcome: Progressing  2/6/2023 0616 by Bren Rajan R.N.  Outcome: Progressing     Problem: Mechanical Ventilation  Goal: Safe management of artificial airway and ventilation  2/6/2023 0640 by Bren Rajan R.N.  Outcome: Progressing  2/6/2023 0616 by Bren Rajan R.N.  Outcome: Progressing  Goal: Successful weaning off mechanical ventilator, spontaneously maintains adequate gas exchange  2/6/2023 0640 by Bren Rajan R.N.  Outcome: Progressing  2/6/2023 0616 by Bren Rajan R.N.  Outcome: Progressing  Goal: Patient will be able to express needs and understand communication  2/6/2023 0640 by Bren Rajan R.N.  Outcome: Progressing  2/6/2023 0616 by Bren Rajan R.N.  Outcome: Progressing     Problem: Physical Regulation  Goal: Diagnostic test results  will improve  2/6/2023 0640 by Bren Rajan R.N.  Outcome: Progressing  2/6/2023 0616 by Bren Rajan R.N.  Outcome: Progressing  Goal: Signs and symptoms of infection will decrease  2/6/2023 0640 by Bren Rajan R.N.  Outcome: Progressing  2/6/2023 0616 by Bren Rajan R.N.  Outcome: Progressing     Problem: Pain - Standard  Goal: Alleviation of pain or a reduction in pain to the patient’s comfort goal  2/6/2023 0640 by Bren Rajan R.N.  Outcome: Progressing  2/6/2023 0616 by Bren Rajan R.N.  Outcome: Progressing     Problem: Skin Integrity  Goal: Skin integrity is maintained or improved  2/6/2023 0640 by Bren Rajan R.N.  Outcome: Progressing  2/6/2023 0616 by Bren Rajan R.N.  Outcome: Progressing     Problem: Fall Risk  Goal: Patient will remain free from falls  2/6/2023 0640 by Bren Rajan R.N.  Outcome: Progressing  2/6/2023 0616 by Bren Rajan R.N.  Outcome: Progressing     Problem: Safety - Medical Restraint  Goal: Remains free of injury from restraints (Restraint for Interference with Medical Device)  2/6/2023 0640 by Bren Rajan R.N.  Outcome: Progressing  2/6/2023 0616 by Bren Rajan R.N.  Outcome: Progressing  Goal: Free from restraint(s) (Restraint for Interference with Medical Device)  2/6/2023 0640 by Bren Rajan R.N.  Outcome: Progressing  2/6/2023 0616 by Bren Rajan R.N.  Outcome: Progressing   The patient is Watcher - Medium risk of patient condition declining or worsening    Shift Goals  Clinical Goals: Monitor blood sugars, rest, improved neuro function  Patient Goals: IVA  Family Goals: IVA    Progress made toward(s) clinical / shift goals:      Patient is not progressing towards the following goals:

## 2023-02-06 NOTE — ASSESSMENT & PLAN NOTE
Suspect poor diet and malnutrition with refeeding syndrome suspected  Replete K to goal > 4  Replete Phos to goal > 2.5  Replete Mg to goal > 2  Monitor daily BMPs

## 2023-02-06 NOTE — ASSESSMENT & PLAN NOTE
Suspected multifactorial:  Toxic and metabolic, UDS (+) for amphetamines and patient with DKA  Cont to correct all underlying metabolic disturbances  Monitor for withdrawal syndromes  Limit sedatives  Cont abx for abscess  Seems to be improving slowly

## 2023-02-06 NOTE — ANESTHESIA PROCEDURE NOTES
Airway    Date/Time: 2/6/2023 3:44 PM  Performed by: Familia Talamantes M.D.  Authorized by: Familia Talamantes M.D.     Location:  OR  Urgency:  Elective  Indications for Airway Management:  Anesthesia      Spontaneous Ventilation: absent    Sedation Level:  Deep  Preoxygenated: Yes    Patient Position:  Sniffing  Mask Difficulty Assessment:  0 - not attempted  Final Airway Type:  Endotracheal airway  Final Endotracheal Airway:  ETT  Cuffed: Yes    Technique Used for Successful ETT Placement:  Direct laryngoscopy  Devices/Methods Used in Placement:  Cricoid pressure    Insertion Site:  Oral  Blade Type:  Evan  Laryngoscope Blade/Videolaryngoscope Blade Size:  3  ETT Size (mm):  7.0  Measured from:  Teeth  ETT to Teeth (cm):  22  Placement Verified by: auscultation and capnometry    Cormack-Lehane Classification:  Grade I - full view of glottis  Number of Attempts at Approach:  1

## 2023-02-06 NOTE — PROGRESS NOTES
HPI: Fannie Driver is a 62 y.o. female who presented to the ED with altered consciousness.  Patient has longstanding history of diabetes, and DVT.  Patient was brought in by EMS.  Patient significantly agitated, and unable to provide much history. Found to be in DKA and admitted for stabilization.  OMFS consulted for left sided facial swelling associated with carious teeth.      Interval: Now off DKA protocol, stable from ICU standpoint for OR.     Vitals:    02/06/23 0900   BP: (!) 140/73   Pulse: 90   Resp: (!) 25   Temp:    SpO2: 94%     PE:   Gen: awake, not oriented  HEENT: NC/AT, PERRL, EOMI B, left sided fluctuant swelling overlying mandibular body. Border of mandible palpable from angle to angle, trachea midline  Max/Abi: gary 40 mm, fom soft, nt/nd, multiple carious teeth in left lower quadrant. Uvula midline.     Recent Labs     02/03/23  1340 02/03/23  1712 02/05/23  0450 02/06/23  0415   WBC 18.3* 17.5* 8.7 8.8   RBC 5.33 5.22 4.34 3.77*   HEMOGLOBIN 16.5* 16.0 13.3 11.5*   HEMATOCRIT 50.3* 49.3* 39.6 32.7*   MCV 94.4 94.4 91.2 86.7   MCH 31.0 30.7 30.6 30.5   RDW 45.7 45.6 47.6 45.7   PLATELETCT 447* 375 284 254   MPV 10.9 10.7 10.5 10.1   NEUTSPOLYS 81.70* 80.40* 75.30*  --    LYMPHOCYTES 4.90* 4.70* 13.50*  --    MONOCYTES 12.10 12.90 9.00  --    EOSINOPHILS 0.00 0.10 1.40  --    BASOPHILS 0.30 0.50 0.30  --      Recent Labs     02/05/23  2255 02/06/23  0415 02/06/23  0820   SODIUM 146* 146* 146*   POTASSIUM 3.4* 3.5* 3.3*   CHLORIDE 118* 116* 114*   CO2 22 23 24   GLUCOSE 184* 145* 84   BUN 13 11 10     Imaging: no new imaging    Assessment: Fannie Driver is a 63 y/o female who presented to the hospital in [facility] and with a left buccal odontogenic abscess, now stable for OR     Plan:   Plan for OR at 3 pm with my partner Dr. Roseline ESPARZA until then   Call with questions or concerns [phone]  Steve Luong DDS, MD, FACS

## 2023-02-06 NOTE — CARE PLAN
Problem: Knowledge Deficit - Standard  Goal: Patient and family/care givers will demonstrate understanding of plan of care, disease process/condition, diagnostic tests and medications  Outcome: Progressing     Problem: Hemodynamics  Goal: Patient's hemodynamics, fluid balance and neurologic status will be stable or improve  Outcome: Progressing     Problem: Fluid Volume  Goal: Fluid volume balance will be maintained  Outcome: Progressing     Problem: Urinary - Renal Perfusion  Goal: Ability to achieve and maintain adequate renal perfusion and functioning will improve  Outcome: Progressing     Problem: Respiratory  Goal: Patient will achieve/maintain optimum respiratory ventilation and gas exchange  Outcome: Progressing     Problem: Mechanical Ventilation  Goal: Safe management of artificial airway and ventilation  Outcome: Progressing  Goal: Successful weaning off mechanical ventilator, spontaneously maintains adequate gas exchange  Outcome: Progressing  Goal: Patient will be able to express needs and understand communication  Outcome: Progressing     Problem: Physical Regulation  Goal: Diagnostic test results will improve  Outcome: Progressing  Goal: Signs and symptoms of infection will decrease  Outcome: Progressing     Problem: Pain - Standard  Goal: Alleviation of pain or a reduction in pain to the patient’s comfort goal  Outcome: Progressing     Problem: Skin Integrity  Goal: Skin integrity is maintained or improved  Outcome: Progressing     Problem: Fall Risk  Goal: Patient will remain free from falls  Outcome: Progressing     Problem: Safety - Medical Restraint  Goal: Remains free of injury from restraints (Restraint for Interference with Medical Device)  Outcome: Progressing  Goal: Free from restraint(s) (Restraint for Interference with Medical Device)  Outcome: Progressing   The patient is {Patient Stability:[id number]}    Shift Goals  Clinical Goals: Monitor blood sugars, rest, improved neuro  function  Patient Goals: VIA  Family Goals: IVA    Progress made toward(s) clinical / shift goals:  ***    Patient is not progressing towards the following goals:

## 2023-02-07 ENCOUNTER — APPOINTMENT (OUTPATIENT)
Dept: RADIOLOGY | Facility: MEDICAL CENTER | Age: 63
DRG: 853 | End: 2023-02-07
Attending: STUDENT IN AN ORGANIZED HEALTH CARE EDUCATION/TRAINING PROGRAM
Payer: MEDICARE

## 2023-02-07 PROBLEM — I82.409 DVT (DEEP VENOUS THROMBOSIS) (HCC): Status: RESOLVED | Noted: 2022-07-09 | Resolved: 2023-02-07

## 2023-02-07 PROBLEM — Z86.718 HISTORY OF DVT (DEEP VEIN THROMBOSIS): Status: ACTIVE | Noted: 2023-02-07

## 2023-02-07 LAB
ANION GAP SERPL CALC-SCNC: 6 MMOL/L (ref 7–16)
ANION GAP SERPL CALC-SCNC: 8 MMOL/L (ref 7–16)
ANION GAP SERPL CALC-SCNC: 8 MMOL/L (ref 7–16)
BUN SERPL-MCNC: 15 MG/DL (ref 8–22)
CALCIUM SERPL-MCNC: 8 MG/DL (ref 8.5–10.5)
CALCIUM SERPL-MCNC: 8.3 MG/DL (ref 8.5–10.5)
CALCIUM SERPL-MCNC: 8.4 MG/DL (ref 8.5–10.5)
CHLORIDE SERPL-SCNC: 109 MMOL/L (ref 96–112)
CHLORIDE SERPL-SCNC: 109 MMOL/L (ref 96–112)
CHLORIDE SERPL-SCNC: 110 MMOL/L (ref 96–112)
CO2 SERPL-SCNC: 22 MMOL/L (ref 20–33)
CO2 SERPL-SCNC: 23 MMOL/L (ref 20–33)
CO2 SERPL-SCNC: 24 MMOL/L (ref 20–33)
CREAT SERPL-MCNC: 0.54 MG/DL (ref 0.5–1.4)
CREAT SERPL-MCNC: 0.55 MG/DL (ref 0.5–1.4)
CREAT SERPL-MCNC: 0.61 MG/DL (ref 0.5–1.4)
ERYTHROCYTE [DISTWIDTH] IN BLOOD BY AUTOMATED COUNT: 47.7 FL (ref 35.9–50)
GFR SERPLBLD CREATININE-BSD FMLA CKD-EPI: 101 ML/MIN/1.73 M 2
GFR SERPLBLD CREATININE-BSD FMLA CKD-EPI: 104 ML/MIN/1.73 M 2
GFR SERPLBLD CREATININE-BSD FMLA CKD-EPI: 104 ML/MIN/1.73 M 2
GLUCOSE BLD STRIP.AUTO-MCNC: 184 MG/DL (ref 65–99)
GLUCOSE BLD STRIP.AUTO-MCNC: 208 MG/DL (ref 65–99)
GLUCOSE BLD STRIP.AUTO-MCNC: 221 MG/DL (ref 65–99)
GLUCOSE BLD STRIP.AUTO-MCNC: 238 MG/DL (ref 65–99)
GLUCOSE BLD STRIP.AUTO-MCNC: 252 MG/DL (ref 65–99)
GLUCOSE BLD STRIP.AUTO-MCNC: 261 MG/DL (ref 65–99)
GLUCOSE SERPL-MCNC: 199 MG/DL (ref 65–99)
GLUCOSE SERPL-MCNC: 213 MG/DL (ref 65–99)
GLUCOSE SERPL-MCNC: 235 MG/DL (ref 65–99)
HCT VFR BLD AUTO: 32.3 % (ref 37–47)
HGB BLD-MCNC: 11 G/DL (ref 12–16)
MCH RBC QN AUTO: 30.4 PG (ref 27–33)
MCHC RBC AUTO-ENTMCNC: 34.1 G/DL (ref 33.6–35)
MCV RBC AUTO: 89.2 FL (ref 81.4–97.8)
PLATELET # BLD AUTO: 253 K/UL (ref 164–446)
PMV BLD AUTO: 10.4 FL (ref 9–12.9)
POTASSIUM SERPL-SCNC: 3.6 MMOL/L (ref 3.6–5.5)
POTASSIUM SERPL-SCNC: 3.7 MMOL/L (ref 3.6–5.5)
POTASSIUM SERPL-SCNC: 3.8 MMOL/L (ref 3.6–5.5)
RBC # BLD AUTO: 3.62 M/UL (ref 4.2–5.4)
SODIUM SERPL-SCNC: 139 MMOL/L (ref 135–145)
SODIUM SERPL-SCNC: 139 MMOL/L (ref 135–145)
SODIUM SERPL-SCNC: 141 MMOL/L (ref 135–145)
WBC # BLD AUTO: 6.7 K/UL (ref 4.8–10.8)

## 2023-02-07 PROCEDURE — 700105 HCHG RX REV CODE 258: Performed by: INTERNAL MEDICINE

## 2023-02-07 PROCEDURE — 700102 HCHG RX REV CODE 250 W/ 637 OVERRIDE(OP): Performed by: INTERNAL MEDICINE

## 2023-02-07 PROCEDURE — A9270 NON-COVERED ITEM OR SERVICE: HCPCS | Performed by: EMERGENCY MEDICINE

## 2023-02-07 PROCEDURE — 700111 HCHG RX REV CODE 636 W/ 250 OVERRIDE (IP): Performed by: INTERNAL MEDICINE

## 2023-02-07 PROCEDURE — 85027 COMPLETE CBC AUTOMATED: CPT

## 2023-02-07 PROCEDURE — A9270 NON-COVERED ITEM OR SERVICE: HCPCS | Performed by: HOSPITALIST

## 2023-02-07 PROCEDURE — 82962 GLUCOSE BLOOD TEST: CPT | Mod: 91

## 2023-02-07 PROCEDURE — 700102 HCHG RX REV CODE 250 W/ 637 OVERRIDE(OP): Performed by: STUDENT IN AN ORGANIZED HEALTH CARE EDUCATION/TRAINING PROGRAM

## 2023-02-07 PROCEDURE — 700102 HCHG RX REV CODE 250 W/ 637 OVERRIDE(OP): Performed by: EMERGENCY MEDICINE

## 2023-02-07 PROCEDURE — A9270 NON-COVERED ITEM OR SERVICE: HCPCS | Performed by: STUDENT IN AN ORGANIZED HEALTH CARE EDUCATION/TRAINING PROGRAM

## 2023-02-07 PROCEDURE — A9270 NON-COVERED ITEM OR SERVICE: HCPCS | Performed by: INTERNAL MEDICINE

## 2023-02-07 PROCEDURE — 700102 HCHG RX REV CODE 250 W/ 637 OVERRIDE(OP): Performed by: HOSPITALIST

## 2023-02-07 PROCEDURE — 80048 BASIC METABOLIC PNL TOTAL CA: CPT | Mod: 91

## 2023-02-07 PROCEDURE — 51798 US URINE CAPACITY MEASURE: CPT

## 2023-02-07 PROCEDURE — 770001 HCHG ROOM/CARE - MED/SURG/GYN PRIV*

## 2023-02-07 PROCEDURE — 99222 1ST HOSP IP/OBS MODERATE 55: CPT | Performed by: HOSPITALIST

## 2023-02-07 PROCEDURE — 99233 SBSQ HOSP IP/OBS HIGH 50: CPT | Performed by: INTERNAL MEDICINE

## 2023-02-07 RX ORDER — AMOXICILLIN AND CLAVULANATE POTASSIUM 500; 125 MG/1; MG/1
1 TABLET, FILM COATED ORAL EVERY 8 HOURS
Status: DISCONTINUED | OUTPATIENT
Start: 2023-02-07 | End: 2023-02-09 | Stop reason: HOSPADM

## 2023-02-07 RX ADMIN — AMOXICILLIN AND CLAVULANATE POTASSIUM 1 TABLET: 500; 125 TABLET, FILM COATED ORAL at 16:58

## 2023-02-07 RX ADMIN — ACETAMINOPHEN 1000 MG: 500 TABLET, FILM COATED ORAL at 12:48

## 2023-02-07 RX ADMIN — AMPICILLIN AND SULBACTAM 3 G: 1; 2 INJECTION, POWDER, FOR SOLUTION INTRAMUSCULAR; INTRAVENOUS at 04:47

## 2023-02-07 RX ADMIN — INSULIN GLARGINE-YFGN 30 UNITS: 100 INJECTION, SOLUTION SUBCUTANEOUS at 05:00

## 2023-02-07 RX ADMIN — SENNOSIDES AND DOCUSATE SODIUM 2 TABLET: 50; 8.6 TABLET ORAL at 05:00

## 2023-02-07 RX ADMIN — ACETAMINOPHEN 1000 MG: 500 TABLET, FILM COATED ORAL at 23:16

## 2023-02-07 RX ADMIN — AMPICILLIN AND SULBACTAM 3 G: 1; 2 INJECTION, POWDER, FOR SOLUTION INTRAMUSCULAR; INTRAVENOUS at 00:12

## 2023-02-07 RX ADMIN — ACETAMINOPHEN 1000 MG: 500 TABLET, FILM COATED ORAL at 05:00

## 2023-02-07 RX ADMIN — AMPICILLIN AND SULBACTAM 3 G: 1; 2 INJECTION, POWDER, FOR SOLUTION INTRAMUSCULAR; INTRAVENOUS at 12:57

## 2023-02-07 RX ADMIN — INSULIN HUMAN 7 UNITS: 100 INJECTION, SOLUTION PARENTERAL at 17:30

## 2023-02-07 RX ADMIN — INSULIN HUMAN 4 UNITS: 100 INJECTION, SOLUTION PARENTERAL at 21:09

## 2023-02-07 RX ADMIN — AMOXICILLIN AND CLAVULANATE POTASSIUM 1 TABLET: 500; 125 TABLET, FILM COATED ORAL at 21:11

## 2023-02-07 RX ADMIN — RIVAROXABAN 20 MG: 20 TABLET, FILM COATED ORAL at 17:33

## 2023-02-07 RX ADMIN — INSULIN HUMAN 3 UNITS: 100 INJECTION, SOLUTION PARENTERAL at 07:37

## 2023-02-07 RX ADMIN — INSULIN HUMAN 7 UNITS: 100 INJECTION, SOLUTION PARENTERAL at 12:58

## 2023-02-07 ASSESSMENT — PATIENT HEALTH QUESTIONNAIRE - PHQ9
1. LITTLE INTEREST OR PLEASURE IN DOING THINGS: NOT AT ALL
1. LITTLE INTEREST OR PLEASURE IN DOING THINGS: NOT AT ALL
2. FEELING DOWN, DEPRESSED, IRRITABLE, OR HOPELESS: NOT AT ALL
SUM OF ALL RESPONSES TO PHQ9 QUESTIONS 1 AND 2: 0
SUM OF ALL RESPONSES TO PHQ9 QUESTIONS 1 AND 2: 0
2. FEELING DOWN, DEPRESSED, IRRITABLE, OR HOPELESS: NOT AT ALL

## 2023-02-07 ASSESSMENT — ENCOUNTER SYMPTOMS
BLURRED VISION: 0
BRUISES/BLEEDS EASILY: 0
DEPRESSION: 0
NECK PAIN: 0
WEAKNESS: 0
NAUSEA: 0
HEADACHES: 0
VOMITING: 0
FLANK PAIN: 0
COUGH: 0
SHORTNESS OF BREATH: 0
CHILLS: 0
ABDOMINAL PAIN: 0
FEVER: 0
SORE THROAT: 1
FOCAL WEAKNESS: 0
ROS GI COMMENTS: TOLERATING A DIET
DOUBLE VISION: 0
BACK PAIN: 0
DIZZINESS: 0

## 2023-02-07 ASSESSMENT — PAIN DESCRIPTION - PAIN TYPE
TYPE: ACUTE PAIN

## 2023-02-07 ASSESSMENT — FIBROSIS 4 INDEX
FIB4 SCORE: 0.85
FIB4 SCORE: 0.85

## 2023-02-07 ASSESSMENT — PAIN SCALES - GENERAL: PAIN_LEVEL: 0

## 2023-02-07 ASSESSMENT — LIFESTYLE VARIABLES: SUBSTANCE_ABUSE: 1

## 2023-02-07 NOTE — ASSESSMENT & PLAN NOTE
Status post admission to the ICU for an IV insulin drip and aggressive IV fluids with electrolyte replacement  She has been placed on glargine 30 units daily with sliding scale and likely would be a good candidate for outpatient long-acting insulin with a relatively simple mealtime insulin such as 5 units of regular as opposed to sliding scale.    BG still high, 200-300, increase lantus to 35 units, continue ssi'  Ordered diabetes education

## 2023-02-07 NOTE — CARE PLAN
The patient is Watcher - Medium risk of patient condition declining or worsening    Shift Goals  Clinical Goals: Monitor blood sugar, stable hemodynamics  Patient Goals: IVA  Family Goals: IVA    Progress made toward(s) clinical / shift goals:    Problem: Hemodynamics  Goal: Patient's hemodynamics, fluid balance and neurologic status will be stable or improve  Outcome: Progressing     Problem: Respiratory  Goal: Patient will achieve/maintain optimum respiratory ventilation and gas exchange  Outcome: Progressing     Problem: Pain - Standard  Goal: Alleviation of pain or a reduction in pain to the patient’s comfort goal  Outcome: Progressing     Problem: Skin Integrity  Goal: Skin integrity is maintained or improved  Outcome: Progressing     Problem: Fall Risk  Goal: Patient will remain free from falls  Outcome: Progressing       Patient is not progressing towards the following goals:

## 2023-02-07 NOTE — CONSULTS
Hospital Medicine Consultation    Date of Service  2/7/2023    Referring Physician  Fabby Bolanos M.D.    Consulting Physician  Buddy Chaparro M.D.    Reason for Consultation  Poorly controlled diabetes mellitus    History of Presenting Illness  62 y.o. female who presented 2/3/2023 with facial abscess.  Ms. Driver has a past medical history of diabetes myelitis and prior deep venous thrombosis that was brought to the emergency room on 2/3/2023 by paramedics because of confusion and dental abscess.  In the emergency room she had incision and drainage of an oral abscess and oral surgery was consulted.  She was found to have a glucose of 764 bicarb of 3 and anion gap of 33 consistent with diabetic ketoacidosis.  Her white blood cell count was 18,000.  Lipase was greater than 3,000.  She was admitted to the intensive care unit on IV insulin drip with IV antibiotics and aggressive fluid resuscitation.  She went to the operating room on 2/6/2023 with incision and drainage of the left buccal space abscess and extraction of teeth 19, 20, and 21.  2/7: Ms. Driver was seen and evaluated in the intensive care unit.  She is a moderate though not excellent historian.  She admits that she was not taking of her medications prior to coming in and that she was indulging in methamphetamine. We discussed that she likely will need to go home on insulin.  Review of Systems  Review of Systems   Constitutional:  Negative for chills and fever.   HENT:          Left jaw pain and swelling   Respiratory:  Negative for shortness of breath.    Cardiovascular:  Negative for chest pain.   Gastrointestinal:         Tolerating a diet   All other systems reviewed and are negative.    Past Medical History  NIDDM, HTN, high cholesterol. DVT    Surgical History   has a past surgical history that includes other abdominal surgery; cholecystectomy (10/09); and ercp in or (10/30/2009).    Family History  No family history of coronary disease    Social  History   She lives with her son and niece in [facility]. She does not drink nor smoke but does indulge in methamphetamine    Medications  None       Allergies  No Known Allergies    Physical Exam  Temp:  [36.9 °C (98.4 °F)-37 °C (98.6 °F)] 37 °C (98.6 °F)  Pulse:  [78-96] 85  Resp:  [10-25] 17  BP: (106-153)/(52-77) 117/55  SpO2:  [93 %-98 %] 95 %    Physical Exam  Vitals and nursing note reviewed.   Constitutional:       Appearance: She is ill-appearing.   HENT:      Head: Normocephalic.      Comments: Left mandible swelling with induration mildly tender     Mouth/Throat:      Mouth: Mucous membranes are dry.      Pharynx: Oropharynx is clear.   Eyes:      General: No scleral icterus.     Conjunctiva/sclera: Conjunctivae normal.   Cardiovascular:      Rate and Rhythm: Normal rate and regular rhythm.   Pulmonary:      Effort: Pulmonary effort is normal.      Breath sounds: Normal breath sounds.   Abdominal:      General: There is no distension.      Tenderness: There is no abdominal tenderness.   Musculoskeletal:      Right lower leg: No edema.      Left lower leg: No edema.   Skin:     General: Skin is warm and dry.   Neurological:      General: No focal deficit present.      Mental Status: She is alert and oriented to person, place, and time.   Psychiatric:         Mood and Affect: Mood normal.      Comments: Flat affect       Fluids      Laboratory  Recent Labs     02/05/23  0450 02/06/23  0415 02/07/23  0455   WBC 8.7 8.8 6.7   RBC 4.34 3.77* 3.62*   HEMOGLOBIN 13.3 11.5* 11.0*   HEMATOCRIT 39.6 32.7* 32.3*   MCV 91.2 86.7 89.2   MCH 30.6 30.5 30.4   MCHC 33.6 35.2* 34.1   RDW 47.6 45.7 47.7   PLATELETCT 284 254 253   MPV 10.5 10.1 10.4     Recent Labs     02/06/23  1730 02/07/23  0130 02/07/23  0455   SODIUM 143 139 141   POTASSIUM 3.7 3.8 3.7   CHLORIDE 113* 109 110   CO2 21 24 23   GLUCOSE 191* 235* 213*   BUN 9 15 15   CREATININE 0.47* 0.54 0.61   CALCIUM 8.1* 8.3* 8.4*                      Imaging  US-EXTREMITY VENOUS LOWER BILAT   Final Result      CT-SOFT TISSUE NECK WITH   Final Result      1.  LEFT facial subcutaneous fluid collection measuring 3.5 cm.  Abscess is a possibility however there is no significant surrounding inflammatory changes   2.  Small LEFT anterior maxillary tooth abscess.   3.  Heterogeneous LEFT thyroid nodule measuring 18 mm.  Recommend follow-up nonemergent ultrasound.      US-RUQ   Final Result      1.  Prior cholecystectomy.   2.  Mild biliary dilation.   3.  Echogenic shadowing focus in the melania hepatis, possibly stone in the common hepatic duct.   4.  Limited evaluation of pancreas.      DX-CHEST-PORTABLE (1 VIEW)   Final Result      No acute cardiopulmonary disease.      KU-USHEPSU-S/O    (Results Pending)       Assessment/Plan  * DKA, type 2, not at goal (HCC)- (present on admission)  Assessment & Plan  Status post admission to the ICU for an IV insulin drip and aggressive IV fluids with electrolyte replacement  She has been placed on glargine 30 units daily with sliding scale and likely would be a good candidate for outpatient long-acting insulin with a relatively simple mealtime insulin such as 5 units of regular as opposed to sliding scale.  Diabetic education consulted    Oral abscess- (present on admission)  Assessment & Plan   She went to the operating room on 2/6/2023 with incision and drainage of the left buccal space abscess and extraction of teeth 19, 20, and 21.  IV unasyn    History of DVT (deep vein thrombosis)- (present on admission)  Assessment & Plan  Hx of  She was reportedly not taking anticoag  DVT study here is negative  She was started on Xarelto though it is not clear she needs it.    Acute encephalopathy- (present on admission)  Assessment & Plan  Secondary to DKA and sepsis  This has resolved    Hyperphosphatemia- (present on admission)  Assessment & Plan  Replacement was given    Acute pancreatitis- (present on admission)  Assessment &  Plan  Lipase was over 3,000 on admit and trended down to 79  Triglycerides were not very elevated    Amphetamine use- (present on admission)  Assessment & Plan  Cessation discussed

## 2023-02-07 NOTE — OR NURSING
1624 Pt to PACU from OR. Report from anesthesia and OR RN. On 8L O2 via simple mask. OPA in place. Respirations even and unlabored. VSS.      1627 OPA removed.    1635 awakens to voice, does not respond to questions.     1700 Transfer orders received. Meets transfer criteria at this time. No pain. No nausea. No oxygen. Pt transferred to [facility] via [facility] with RN.

## 2023-02-07 NOTE — ANESTHESIA TIME REPORT
Anesthesia Start and Stop Event Times     Date Time Event    2/6/2023 1504 Ready for Procedure     1535 Anesthesia Start     1626 Anesthesia Stop        Responsible Staff  02/06/23    Name Role Begin Hyun Talamantes M.D. Anesth 1535 1626        Overtime Reason:  no overtime (within assigned shift)    Comments:

## 2023-02-07 NOTE — CARE PLAN
Problem: Knowledge Deficit - Standard  Goal: Patient and family/care givers will demonstrate understanding of plan of care, disease process/condition, diagnostic tests and medications  Outcome: Progressing     Problem: Hemodynamics  Goal: Patient's hemodynamics, fluid balance and neurologic status will be stable or improve  Outcome: Progressing     Problem: Fluid Volume  Goal: Fluid volume balance will be maintained  Outcome: Progressing     Problem: Urinary - Renal Perfusion  Goal: Ability to achieve and maintain adequate renal perfusion and functioning will improve  Outcome: Progressing     Problem: Respiratory  Goal: Patient will achieve/maintain optimum respiratory ventilation and gas exchange  Outcome: Progressing     Problem: Mechanical Ventilation  Goal: Safe management of artificial airway and ventilation  Outcome: Progressing  Goal: Successful weaning off mechanical ventilator, spontaneously maintains adequate gas exchange  Outcome: Progressing  Goal: Patient will be able to express needs and understand communication  Outcome: Progressing     Problem: Physical Regulation  Goal: Diagnostic test results will improve  Outcome: Progressing  Goal: Signs and symptoms of infection will decrease  Outcome: Progressing     Problem: Pain - Standard  Goal: Alleviation of pain or a reduction in pain to the patient’s comfort goal  Outcome: Progressing     Problem: Skin Integrity  Goal: Skin integrity is maintained or improved  Outcome: Progressing     Problem: Fall Risk  Goal: Patient will remain free from falls  Outcome: Progressing     Problem: Safety - Medical Restraint  Goal: Remains free of injury from restraints (Restraint for Interference with Medical Device)  Outcome: Progressing  Goal: Free from restraint(s) (Restraint for Interference with Medical Device)  Outcome: Progressing

## 2023-02-07 NOTE — ANESTHESIA POSTPROCEDURE EVALUATION
Patient: Fannie Driver    Procedure Summary     Date: 02/06/23 Room / Location: [facility] ROOM 27 / SURGERY SAME DAY [facility]    Anesthesia Start: 1535 Anesthesia Stop: 1626    Procedure: I&D OF LEFT BUCCAL ABSCESS; EXTRACTION OF TEETH (Mouth) Diagnosis: (DENTAL CARIES; LEFT BUCCAL ABSCESS)    Surgeons: Lore Dukes D.D.S. Responsible Provider: Familia Talamantes M.D.    Anesthesia Type: general ASA Status: 3 - Emergent          Final Anesthesia Type: general  Last vitals  BP   Blood Pressure: 102/67    Temp   36.5 °C (97.7 °F)    Pulse   89   Resp   18    SpO2   95 %      Anesthesia Post Evaluation    Patient location during evaluation: PACU  Patient participation: complete - patient participated  Level of consciousness: awake and alert  Pain score: 0    Airway patency: patent  Anesthetic complications: no  Cardiovascular status: hemodynamically stable  Respiratory status: acceptable  Hydration status: euvolemic    PONV: none          No notable events documented.     Nurse Pain Score: 0 (NPRS)

## 2023-02-07 NOTE — DIETARY
Nutrition Services: Diabetes diet Education Consult   Day 4 of admit.  Fannie Driver is a 62 y.o. female with admitting DX of DKA, type 2, not at goal (HCC) [E11.10]    RD able to visit pt at bedside to provide Diabetes diet education. RD discussed consistent CHO diet and CHO counting, provided handout reinforcing topics discussed. Pt demonstrated  readiness and evidence of learning. RD able to answer all questions to patient's satisfaction.     No other education needs identified at this time. Consider referral to outpatient nutrition services for continuation of education as indicated or per pt preferences.     Please re-consult RD as indicated.

## 2023-02-07 NOTE — PROGRESS NOTES
Critical Care Progress Note    Date of admission  2/3/2023    Chief Complaint  62 y.o. female admitted 2/3/2023 with acute encephalopathy and DKA    Hospital Course  Ms. Driver is a 62 year old lady with the past medical history significant for diabetes and left lower leg DVT who presented to the ER on 2/3/2023 with altered mental status.  The patient was found to be in DKA and septic due to a left facial/odontogenic abscess.  An I&D was performed in the ER and OMFS was consulted.  The patient received over 3 L of crystalloid resuscitation and then was admitted to the ICU.  Patient also was found to have a lipase greater than 3000 and the UDS positive for amphetamines.  2/4 - DKA protocols ongoing  2/5 - transitioned off DKA protocol-->AG opened and placed back on DKA protocol overnight  2/6 - transitioned off DKA protocol, went to OR for abscess drainage and tooth extraction    Interval Problem Update  Reviewed last 24 hour events:   - s/p I&D of dental abscess with extraction of diseased teeth   - no events overnight   - sleepy, but arouses   - drinking water   - a/ox2-3   - SR 80s   - -140s   - Tmax 98.6   - tolerating clears   - up to chair   - UOP of 500cc overnight, Vivas   - BM x 1 yesterday   - Room air   - Xarelto   - Unasyn   - Abscess is growing Strep anginosus and Cutibacterim acnes   - Mg 2   - Phos 3   - K 3.7   - blood cultures negative from 2/3    Yesterday's Events:   - no events overnight   - more alert, drinking water   - a/ox2-3   - SR/ST 80-100s   - -160s   - afebrile   - Vivas   - insulin at 9 units/hr   - D10 at 125cc/hr   - on room air   - Xarelto   - Unasyn-->Strep in wound cultures   - Mg 1.5   - K 3.3   - phos 0.9   - -170s    Review of Systems  Review of Systems   Constitutional:  Positive for malaise/fatigue. Negative for chills and fever.   HENT:  Positive for sore throat. Negative for congestion.    Eyes:  Negative for blurred vision and double vision.    Respiratory:  Negative for cough and shortness of breath.    Cardiovascular:  Negative for chest pain and leg swelling.   Gastrointestinal:  Negative for abdominal pain, nausea and vomiting.   Genitourinary:  Negative for flank pain and hematuria.   Musculoskeletal:  Negative for back pain and neck pain.   Skin:  Negative for rash.   Neurological:  Negative for dizziness, focal weakness, weakness and headaches.   Endo/Heme/Allergies:  Does not bruise/bleed easily.   Psychiatric/Behavioral:  Positive for substance abuse. Negative for depression.     No change to ROS    Vital Signs for last 24 hours   Temp:  [36.9 °C (98.4 °F)-37 °C (98.6 °F)] 37 °C (98.6 °F)  Pulse:  [78-96] 85  Resp:  [10-25] 17  BP: (106-153)/(52-77) 117/55  SpO2:  [93 %-98 %] 95 %    Hemodynamic parameters for last 24 hours       Respiratory Information for the last 24 hours       Physical Exam   Physical Exam  Vitals and nursing note reviewed.   Constitutional:       General: She is not in acute distress.     Appearance: She is ill-appearing. She is not toxic-appearing.      Comments: Appears older than stated age and chronically ill, quite disheveled    HENT:      Head: Normocephalic and atraumatic.      Right Ear: External ear normal.      Left Ear: External ear normal.      Nose: Nose normal. No rhinorrhea.      Mouth/Throat:      Mouth: Mucous membranes are moist.      Comments: Poor dentition, area of swelling to left lower jaw  Eyes:      General: No scleral icterus.     Conjunctiva/sclera: Conjunctivae normal.      Pupils: Pupils are equal, round, and reactive to light.   Cardiovascular:      Rate and Rhythm: Normal rate and regular rhythm.      Pulses: Normal pulses.      Heart sounds: Normal heart sounds. No murmur heard.  Pulmonary:      Breath sounds: Normal breath sounds. No wheezing.   Abdominal:      Palpations: Abdomen is soft.      Tenderness: There is no abdominal tenderness. There is no guarding or rebound.   Musculoskeletal:          General: Normal range of motion.      Cervical back: Normal range of motion and neck supple.      Right lower leg: No edema.      Left lower leg: No edema.   Lymphadenopathy:      Cervical: No cervical adenopathy.   Skin:     General: Skin is warm and dry.      Capillary Refill: Capillary refill takes 2 to 3 seconds.   Neurological:      General: No focal deficit present.      Mental Status: She is alert. She is disoriented.      Cranial Nerves: No cranial nerve deficit.      Sensory: No sensory deficit.      Motor: No weakness.      Comments: Improved orientation: knows she is in the hospital and her name   Psychiatric:      Comments: Calm/cooperative       Medications  Current Facility-Administered Medications   Medication Dose Route Frequency Provider Last Rate Last Admin    insulin GLARGINE (Lantus,Semglee) injection  30 Units Subcutaneous QAM INSULIN Fabby Bolanos M.D.   30 Units at 02/07/23 0500    insulin regular (HumuLIN R,NovoLIN R) injection  3-14 Units Subcutaneous 4X/DAY ACHS Fabby Bolanos M.D.   4 Units at 02/06/23 2040    And    dextrose 10 % BOLUS 25 g  25 g Intravenous Q15 MIN PRN Fabby Bolanos M.D.        potassium phosphate 30 mmol in  mL ivpb  30 mmol Intravenous Once Fabby Bolanos M.D.   Held at 02/06/23 1702    acetaminophen (TYLENOL) tablet 1,000 mg  1,000 mg Oral Q6HRS Maximo Eubanks M.D.   1,000 mg at 02/07/23 0500    ampicillin/sulbactam (UNASYN) 3 g in  mL IVPB  3 g Intravenous Q6HRS ANN Diamond Jr..O.   Stopped at 02/07/23 0517    Pharmacy Consult Request ...Pain Management Review 1 Each  1 Each Other PHARMACY TO DOSE ANN Diamond Jr..O.        senna-docusate (PERICOLACE or SENOKOT S) 8.6-50 MG per tablet 2 Tablet  2 Tablet Oral BID Robbie Wellington M.D.   2 Tablet at 02/07/23 0500    And    polyethylene glycol/lytes (MIRALAX) PACKET 1 Packet  1 Packet Oral QDAY PRN Robbie Wellington M.D.        And    magnesium hydroxide (MILK OF  MAGNESIA) suspension 30 mL  30 mL Oral QDAY PRN Robbie Wellington M.D.        And    bisacodyl (DULCOLAX) suppository 10 mg  10 mg Rectal QDAY PRN Robbie Wellington M.D.        Respiratory Therapy Consult   Nebulization Continuous RT Robbie Wellington M.D.        rivaroxaban (XARELTO) tablet 20 mg  20 mg Oral PM MEAL Rj Shah Jr., D.O.   20 mg at 02/06/23 1800       Fluids    Intake/Output Summary (Last 24 hours) at 2/7/2023 0628  Last data filed at 2/7/2023 0400  Gross per 24 hour   Intake 1935.4 ml   Output 1275 ml   Net 660.4 ml         Laboratory  Recent Labs     02/05/23  0759   ISTATAPH 7.300*   ISTATAPCO2 15.6*   ISTATAPO2 95*   ISTATATCO2 <10*   [id number] 97   ISTATARTHCO3 7.7*   ISTATARTBE -16*   ISTATTEMP 37.0 C   ISTATSPEC Arterial   ISTATAPHTC 7.300*   [id number] 95*           Recent Labs     02/06/23  0415 02/06/23  0820 02/06/23  1340 02/06/23  1730 02/07/23  0130 02/07/23  0455   SODIUM 146*   < > 139 143 139 141   POTASSIUM 3.5*   < > 3.5* 3.7 3.8 3.7   CHLORIDE 116*   < > 109 113* 109 110   CO2 23   < > 22 21 24 23   BUN 11   < > 9 9 15 15   CREATININE 0.45*   < > 0.42* 0.47* 0.54 0.61   MAGNESIUM 1.5  --  2.6* 2.0  --   --    PHOSPHORUS 0.9*  --  3.1 3.0  --   --    CALCIUM 8.7   < > 8.7 8.1* 8.3* 8.4*    < > = values in this interval not displayed.       Recent Labs     02/05/23  0650 02/05/23  1132 02/06/23  1730 02/07/23  0130 02/07/23  0455   ALTSGPT 8  --   --   --   --    ASTSGOT 10*  --   --   --   --    ALKPHOSPHAT 108*  --   --   --   --    TBILIRUBIN 0.2  --   --   --   --    DBILIRUBIN <0.2  --   --   --   --    LIPASE 79  --   --   --   --    GLUCOSE 351*   < > 191* 235* 213*    < > = values in this interval not displayed.       Recent Labs     02/05/23  0450 02/05/23  0650 02/06/23  0415 02/07/23  0455   WBC 8.7  --  8.8 6.7   NEUTSPOLYS 75.30*  --   --   --    LYMPHOCYTES 13.50*  --   --   --    MONOCYTES 9.00  --   --   --    EOSINOPHILS 1.40  --   --   --     BASOPHILS 0.30  --   --   --    ASTSGOT  --  10*  --   --    ALTSGPT  --  8  --   --    ALKPHOSPHAT  --  108*  --   --    TBILIRUBIN  --  0.2  --   --        Recent Labs     02/05/23  0450 02/06/23  0415 02/07/23  0455   RBC 4.34 3.77* 3.62*   HEMOGLOBIN 13.3 11.5* 11.0*   HEMATOCRIT 39.6 32.7* 32.3*   PLATELETCT 284 254 253         Imaging  Lower extremity US:  No prior study is available for comparison.    No evidence of deep venous thrombosis.     Assessment/Plan  * DKA, type 2, not at goal (HCC)- (present on admission)  Assessment & Plan  Possible triggers/factors: L oral abscess, amphetamine use, non-compliance  Transitioned off DKA protocol  Cont Glargine 30 units daily and adjust as needed  High ISS   DM education  DM diet      Electrolyte disturbance- (present on admission)  Assessment & Plan  Suspect poor diet and malnutrition with refeeding syndrome suspected  Replete K to goal > 4  Replete Phos to goal > 2.5  Replete Mg to goal > 2  Monitor daily BMPs    Acute encephalopathy- (present on admission)  Assessment & Plan  Suspected multifactorial:  Toxic and metabolic, UDS (+) for amphetamines and patient with DKA  Cont to correct all underlying metabolic disturbances  Monitor for withdrawal syndromes  Limit sedatives  Cont abx for abscess  Seems to be improving slowly    Acute pancreatitis- (present on admission)  Assessment & Plan  Acute pancreatitis on presentation. Unlikely EtOH related, possible hypertriglyceridemia, biliary stone   Abdominal exam is quite benign-->advance diet  RUQ US: Echogenic shadowing focus in the melania hepatis, possibly stone in the common hepatic duct. Awaiting abdominal MRI.   ?MRCP      Oral abscess- (present on admission)  Assessment & Plan  Left peridontal abcess extending outward.   ERP attempted a small I&D with 18 ga needle  Continue Unasyn 3g IV q6h  OMFS took patient for I&D on 2/6  Wound with Streptococcus angionosus and Cutibacterium acnes  Blood cultures are negative  from 2/3      Hyponatremia- (present on admission)  Assessment & Plan  Resolved.    MAICOL (acute kidney injury) (HCC)- (present on admission)  Assessment & Plan  Likely due to dehydration-->resolved  S/p IVF resuscitation  Renal dose meds, avoid nephrotoxins  Strict I/Os  Follow renal function    Amphetamine use- (present on admission)  Assessment & Plan  Past amphetamine use, positive on UDS.   Does report possible amphetamine use in recent days.  Cessation counseling.    DVT (deep venous thrombosis) (HCC)- (present on admission)  Assessment & Plan  On Xarelto at home, will continue for now  Lower extremity ultrasound negative for DVT         VTE:  Lovenox  Ulcer: Not Indicated  Lines:  PIVs    I have performed a physical exam and reviewed and updated ROS and Plan today (2/7/2023). In review of yesterday's note (2/6/2023), there are no changes except as documented above.     Discussed patient condition and risk of morbidity and/or mortality with Hospitalist, RN, RT, Pharmacy, , Charge nurse / hot rounds, Patient, and OMFS    The patient successfully transitioned off DKA protocol and underwent I&D with OMFS without complication.  She is stable from an ICU perspective and no longer requires critical care services.  The case was discussed with the  hospitalist who will take over care at this time.  The critical care team will sign off at this point.

## 2023-02-07 NOTE — ASSESSMENT & PLAN NOTE
Hx of  She was reportedly not taking anticoag  DVT study here is negative  She was started on Xarelto though it is not clear she needs it.

## 2023-02-07 NOTE — PROGRESS NOTES
[facility] Progress Note      Admit Date: 2/3/2023    Resident(s): Jude Betancourt M.D.   Attending:  MARÍA LAYTON/ Dr. Bolanos    Patient ID:    Name:  Fannie Driver   YOB: 1960  Age:  62 y.o.  female   MRN:  [id number]    Hospital Course:  Fannie Driver is our 62 year old female patient with a past medical history of diabetes and left lower left DVT who presented 2/3/23 with AMS. Found to be in DKA; septic due to left facial/ondogenic abscess. I&D performed; OMFS consulted. Received 3L IVF; admitted to ICU. Lipase >3000, UDS (+) amphetamines.    Consultants:  Critical Care  OMFS    Interval Events:  I&D with OMFS yesterday  Seroquel overnight for agitation  Downtitrating O2 as tolerated  C/w Methadone  Placing NGT    Vitals Range last 24h:  Temp:  [36.9 °C (98.4 °F)-37 °C (98.6 °F)] 37 °C (98.6 °F)  Pulse:  [78-96] 89  Resp:  [10-20] 13  BP: (106-141)/(52-77) 110/55  SpO2:  [93 %-98 %] 93 %      Intake/Output Summary (Last 24 hours) at 2/7/2023 1023  Last data filed at 2/7/2023 0900  Gross per 24 hour   Intake 2392.24 ml   Output 1120 ml   Net 1272.24 ml        ROS   Constitutional:  Positive for malaise/fatigue. Negative for chills and fever.   HENT:  Positive for sore throat. Negative for congestion.    Eyes:  Negative for blurred vision and double vision.   Respiratory:  Negative for cough and shortness of breath.    Cardiovascular:  Negative for chest pain and leg swelling.   Gastrointestinal:  Negative for abdominal pain, nausea and vomiting.   Genitourinary:  Negative for flank pain and hematuria.   Musculoskeletal:  Negative for back pain and neck pain.   Skin:  Negative for rash.   Neurological:  Negative for dizziness, focal weakness, weakness and headaches.   Endo/Heme/Allergies:  Does not bruise/bleed easily.   Psychiatric/Behavioral:  Positive for substance abuse.    PHYSICAL EXAM:  Vitals:    02/07/23 0400 02/07/23 0500 02/07/23 0600 02/07/23 0700   BP: 110/52 117/55 112/53 110/55  "  Pulse: 96 85 89 89   Resp: 15 17  13   Temp:       TempSrc:    Bladder   SpO2: 93% 95% 94% 93%   Weight:  93.4 kg (205 lb 14.6 oz)     Height:  1.727 m (5' 8\")      Body mass index is 31.31 kg/m².    O2 therapy: Pulse Oximetry: 93 %, O2 (LPM): 0, O2 Delivery Device: None - Room Air    Date 02/07/23 0700 - 02/08/23 0659   Shift 9582-0638 2532-5891 2668-4348 24 Hour Total   INTAKE   P.O. 360   360     P.O. 360   360   IV Piggyback 96.8   96.8     Volume (mL) (ampicillin/sulbactam (UNASYN) 3 g in  mL IVPB) 96.8   96.8   Shift Total 456.8   456.8   OUTPUT   Urine 60   60     Output (mL) ([REMOVED] Urethral Catheter 16 Fr. 02/07/23 0908) 60   60   Shift Total 60   60   .8   396.8        Physical Exam  Constitutional:       General: She is not in acute distress.     Appearance: She is ill-appearing. She is not toxic-appearing.      Comments: Appears older than stated age and chronically ill, quite disheveled    HENT:      Head: Normocephalic and atraumatic.      Right Ear: External ear normal.      Left Ear: External ear normal.      Nose: Nose normal. No rhinorrhea.      Mouth/Throat:      Mouth: Mucous membranes are moist.      Comments: Poor dentition, area of swelling to left lower jaw  Cardiovascular:      Rate and Rhythm: Normal rate and regular rhythm.      Pulses: Normal pulses.      Heart sounds: Normal heart sounds. No murmur heard.  Pulmonary:      Breath sounds: Normal breath sounds. No wheezing.   Abdominal:      Palpations: Abdomen is soft.      Tenderness: There is no abdominal tenderness. There is no guarding or rebound.   Neurological:      General: No focal deficit present.      Mental Status: She is alert. She is disoriented.  Comments: Improved orientation: knows she is in the hospital and her name   Psychiatric:      Comments: Calm/cooperative     Recent Labs     02/05/23  0759   ISTATAPH 7.300*   ISTATAPCO2 15.6*   ISTATAPO2 95*   ISTATATCO2 <10*   [id number] 97   ISTATARTHCO3 7.7* "   ISTATARTBE -16*   ISTATTEMP 37.0 C   ISTATSPEC Arterial   ISTATAPHTC 7.300*   [id number] 95*     Recent Labs     02/06/23  0415 02/06/23  0820 02/06/23  1340 02/06/23  1730 02/07/23  0130 02/07/23 0455 02/07/23  0742   SODIUM 146*   < > 139 143 139 141 139   POTASSIUM 3.5*   < > 3.5* 3.7 3.8 3.7 3.6   CHLORIDE 116*   < > 109 113* 109 110 109   CO2 23   < > 22 21 24 23 22   BUN 11   < > 9 9 15 15 15   CREATININE 0.45*   < > 0.42* 0.47* 0.54 0.61 0.55   MAGNESIUM 1.5  --  2.6* 2.0  --   --   --    PHOSPHORUS 0.9*  --  3.1 3.0  --   --   --    CALCIUM 8.7   < > 8.7 8.1* 8.3* 8.4* 8.0*    < > = values in this interval not displayed.     Recent Labs     02/05/23  0650 02/05/23  1132 02/07/23  0130 02/07/23 0455 02/07/23 0742   ALTSGPT 8  --   --   --   --    ASTSGOT 10*  --   --   --   --    ALKPHOSPHAT 108*  --   --   --   --    TBILIRUBIN 0.2  --   --   --   --    DBILIRUBIN <0.2  --   --   --   --    LIPASE 79  --   --   --   --    GLUCOSE 351*   < > 235* 213* 199*    < > = values in this interval not displayed.     Recent Labs     02/05/23  0450 02/06/23 0415 02/07/23 0455   RBC 4.34 3.77* 3.62*   HEMOGLOBIN 13.3 11.5* 11.0*   HEMATOCRIT 39.6 32.7* 32.3*   PLATELETCT 284 254 253     Recent Labs     02/05/23  0450 02/05/23  0650 02/06/23 0415 02/07/23 0455   WBC 8.7  --  8.8 6.7   NEUTSPOLYS 75.30*  --   --   --    LYMPHOCYTES 13.50*  --   --   --    MONOCYTES 9.00  --   --   --    EOSINOPHILS 1.40  --   --   --    BASOPHILS 0.30  --   --   --    ASTSGOT  --  10*  --   --    ALTSGPT  --  8  --   --    ALKPHOSPHAT  --  108*  --   --    TBILIRUBIN  --  0.2  --   --        Meds:   insulin GLARGINE  30 Units      insulin regular  3-14 Units      And    dextrose bolus  25 g      potassium phosphate IVPB (CUSTOM)  30 mmol Stopped (02/06/23 1702)    acetaminophen  1,000 mg      ampicillin-sulbactam (UNASYN) IV  3 g Stopped (02/07/23 0560)    Pharmacy Consult Request  1 Each      senna-docusate  2 Tablet      And     polyethylene glycol/lytes  1 Packet      And    magnesium hydroxide  30 mL      And    bisacodyl  10 mg      Respiratory Therapy Consult        rivaroxaban  20 mg          Imaging:  US-EXTREMITY VENOUS LOWER BILAT   Final Result      CT-SOFT TISSUE NECK WITH   Final Result      1.  LEFT facial subcutaneous fluid collection measuring 3.5 cm.  Abscess is a possibility however there is no significant surrounding inflammatory changes   2.  Small LEFT anterior maxillary tooth abscess.   3.  Heterogeneous LEFT thyroid nodule measuring 18 mm.  Recommend follow-up nonemergent ultrasound.      US-RUQ   Final Result      1.  Prior cholecystectomy.   2.  Mild biliary dilation.   3.  Echogenic shadowing focus in the melania hepatis, possibly stone in the common hepatic duct.   4.  Limited evaluation of pancreas.      DX-CHEST-PORTABLE (1 VIEW)   Final Result      No acute cardiopulmonary disease.      [id number]    (Results Pending)       ASSESSEMENT and PLAN:  * DKA, type 2, not at goal (HCC)- (present on admission)  Assessment & Plan  LEFT oral abscess, amphetamine use, non-compliance to medication regimen are possible triggers for episode of DKA  IVF fluid boluses as needed  Followed DKA protocol insulin gtt until AG closed and serum bicarb > 18; now off  Transitioned to Lantus 20 AM; SSI  Goal Mg: >2, Phos > 2.5, K >4  Diabetic diet     Electrolyte disturbance- (present on admission)  Assessment & Plan  Suspecting poor diet/malnutrition  Possible refeeding syndrome  K goal >4; Phos goal >2.5; Mg goal >2     Acute encephalopathy- (present on admission)  Assessment & Plan  Multifactorial: Toxic metabolic, UDS (+) amphetamines, DKA  Correcting metabolic disturbances  Monitoring for withdrawal  Limit sedatives  Cont Unasyn 3g Q6 hour for abscess  Improving slowly     Acute pancreatitis- (present on admission)  Assessment & Plan  Acute pancreatitis on presentation  Unlikely EtOH related  Possible hyperTG, biliary stone   Lipid  panel: chol 185//HDL 59/LDL 81  RUQ US: Echogenic shadowing focus in the melania hepatis, possibly stone in the common hepatic duct  IVF  Analgesia  Following up abdominal MRI  MRCP(?)     Oral abscess- (present on admission)  Assessment & Plan  Left peridontal abcess extending outward. Abscess seen on neck CT.   ERP attempted a small I&D with 18 ga needle  Continue Unasyn 3g IV q6h  OMFS to took patient for I&D yesterday  Bcx negative (2/3)     Hyponatremia- (present on admission)  Assessment & Plan  Resolved     MAICOL (acute kidney injury) (HCC)- (present on admission)  Assessment & Plan  Likely due to dehydration-->resolved  S/p IVF resuscitation  Renal dose meds, avoid nephrotoxins  Strict I/Os  Follow renal function     Amphetamine use- (present on admission)  Assessment & Plan  Past amphetamine use, positive on UDS.   Does report possible amphetamine use in recent days.  Cessation counseling.     DVT (deep venous thrombosis) (HCC)- (present on admission)  Assessment & Plan  On Xarelto at home, will continue for now  Lower extremity ultrasound negative for DVT     VTE:  Lovenox  Ulcer: Not Indicated  Lines:  Juan Betancourt M.D.

## 2023-02-07 NOTE — ASSESSMENT & PLAN NOTE
She went to the operating room on 2/6/2023 with incision and drainage of the left buccal space abscess and extraction of teeth 19, 20, and 21.  IV unasyn    Transition to augmentin

## 2023-02-08 LAB
BACTERIA BLD CULT: NORMAL
BACTERIA BLD CULT: NORMAL
GLUCOSE BLD STRIP.AUTO-MCNC: 211 MG/DL (ref 65–99)
GLUCOSE BLD STRIP.AUTO-MCNC: 267 MG/DL (ref 65–99)
GLUCOSE BLD STRIP.AUTO-MCNC: 284 MG/DL (ref 65–99)
GLUCOSE BLD STRIP.AUTO-MCNC: 316 MG/DL (ref 65–99)
SIGNIFICANT IND 70042: NORMAL
SIGNIFICANT IND 70042: NORMAL
SITE SITE: NORMAL
SITE SITE: NORMAL
SOURCE SOURCE: NORMAL
SOURCE SOURCE: NORMAL

## 2023-02-08 PROCEDURE — RXMED WILLOW AMBULATORY MEDICATION CHARGE: Performed by: STUDENT IN AN ORGANIZED HEALTH CARE EDUCATION/TRAINING PROGRAM

## 2023-02-08 PROCEDURE — 700102 HCHG RX REV CODE 250 W/ 637 OVERRIDE(OP): Performed by: STUDENT IN AN ORGANIZED HEALTH CARE EDUCATION/TRAINING PROGRAM

## 2023-02-08 PROCEDURE — 82962 GLUCOSE BLOOD TEST: CPT | Mod: 91

## 2023-02-08 PROCEDURE — A9270 NON-COVERED ITEM OR SERVICE: HCPCS | Performed by: EMERGENCY MEDICINE

## 2023-02-08 PROCEDURE — A9270 NON-COVERED ITEM OR SERVICE: HCPCS | Performed by: HOSPITALIST

## 2023-02-08 PROCEDURE — 700102 HCHG RX REV CODE 250 W/ 637 OVERRIDE(OP): Performed by: EMERGENCY MEDICINE

## 2023-02-08 PROCEDURE — 700102 HCHG RX REV CODE 250 W/ 637 OVERRIDE(OP): Performed by: INTERNAL MEDICINE

## 2023-02-08 PROCEDURE — 97162 PT EVAL MOD COMPLEX 30 MIN: CPT

## 2023-02-08 PROCEDURE — 770001 HCHG ROOM/CARE - MED/SURG/GYN PRIV*

## 2023-02-08 PROCEDURE — A9270 NON-COVERED ITEM OR SERVICE: HCPCS | Performed by: INTERNAL MEDICINE

## 2023-02-08 PROCEDURE — 99232 SBSQ HOSP IP/OBS MODERATE 35: CPT | Performed by: STUDENT IN AN ORGANIZED HEALTH CARE EDUCATION/TRAINING PROGRAM

## 2023-02-08 PROCEDURE — A9270 NON-COVERED ITEM OR SERVICE: HCPCS | Performed by: STUDENT IN AN ORGANIZED HEALTH CARE EDUCATION/TRAINING PROGRAM

## 2023-02-08 PROCEDURE — 97530 THERAPEUTIC ACTIVITIES: CPT

## 2023-02-08 PROCEDURE — 700102 HCHG RX REV CODE 250 W/ 637 OVERRIDE(OP): Performed by: HOSPITALIST

## 2023-02-08 PROCEDURE — 97165 OT EVAL LOW COMPLEX 30 MIN: CPT

## 2023-02-08 RX ORDER — INSULIN LISPRO 100 [IU]/ML
3-14 INJECTION, SOLUTION INTRAVENOUS; SUBCUTANEOUS
Qty: 15 ML | Refills: 0 | Status: ACTIVE | OUTPATIENT
Start: 2023-02-08 | End: 2023-03-10

## 2023-02-08 RX ORDER — AMOXICILLIN AND CLAVULANATE POTASSIUM 500; 125 MG/1; MG/1
1 TABLET, FILM COATED ORAL EVERY 8 HOURS
Qty: 21 TABLET | Refills: 0 | Status: ACTIVE | OUTPATIENT
Start: 2023-02-08 | End: 2023-02-16

## 2023-02-08 RX ORDER — INSULIN GLARGINE 100 [IU]/ML
30 INJECTION, SOLUTION SUBCUTANEOUS EVERY MORNING
Qty: 9 ML | Refills: 0 | Status: ACTIVE | OUTPATIENT
Start: 2023-02-08 | End: 2023-03-11

## 2023-02-08 RX ORDER — PEN NEEDLE, DIABETIC 32GX 5/32"
NEEDLE, DISPOSABLE MISCELLANEOUS
Qty: 100 EACH | Refills: 0 | Status: ON HOLD | OUTPATIENT
Start: 2023-02-08 | End: 2023-09-23

## 2023-02-08 RX ADMIN — INSULIN HUMAN 4 UNITS: 100 INJECTION, SOLUTION PARENTERAL at 09:06

## 2023-02-08 RX ADMIN — SENNOSIDES AND DOCUSATE SODIUM 2 TABLET: 50; 8.6 TABLET ORAL at 05:08

## 2023-02-08 RX ADMIN — ACETAMINOPHEN 1000 MG: 500 TABLET, FILM COATED ORAL at 23:39

## 2023-02-08 RX ADMIN — INSULIN HUMAN 7 UNITS: 100 INJECTION, SOLUTION PARENTERAL at 21:28

## 2023-02-08 RX ADMIN — AMOXICILLIN AND CLAVULANATE POTASSIUM 1 TABLET: 500; 125 TABLET, FILM COATED ORAL at 15:41

## 2023-02-08 RX ADMIN — ACETAMINOPHEN 1000 MG: 500 TABLET, FILM COATED ORAL at 17:40

## 2023-02-08 RX ADMIN — AMOXICILLIN AND CLAVULANATE POTASSIUM 1 TABLET: 500; 125 TABLET, FILM COATED ORAL at 21:29

## 2023-02-08 RX ADMIN — INSULIN GLARGINE-YFGN 30 UNITS: 100 INJECTION, SOLUTION SUBCUTANEOUS at 09:04

## 2023-02-08 RX ADMIN — ACETAMINOPHEN 1000 MG: 500 TABLET, FILM COATED ORAL at 05:08

## 2023-02-08 RX ADMIN — SENNOSIDES AND DOCUSATE SODIUM 2 TABLET: 50; 8.6 TABLET ORAL at 17:41

## 2023-02-08 RX ADMIN — ACETAMINOPHEN 1000 MG: 500 TABLET, FILM COATED ORAL at 12:59

## 2023-02-08 RX ADMIN — INSULIN HUMAN 10 UNITS: 100 INJECTION, SOLUTION PARENTERAL at 12:56

## 2023-02-08 RX ADMIN — RIVAROXABAN 20 MG: 20 TABLET, FILM COATED ORAL at 17:40

## 2023-02-08 RX ADMIN — INSULIN HUMAN 7 UNITS: 100 INJECTION, SOLUTION PARENTERAL at 17:45

## 2023-02-08 RX ADMIN — AMOXICILLIN AND CLAVULANATE POTASSIUM 1 TABLET: 500; 125 TABLET, FILM COATED ORAL at 05:08

## 2023-02-08 ASSESSMENT — COGNITIVE AND FUNCTIONAL STATUS - GENERAL
DAILY ACTIVITIY SCORE: 20
MOVING FROM LYING ON BACK TO SITTING ON SIDE OF FLAT BED: A LITTLE
DRESSING REGULAR UPPER BODY CLOTHING: A LITTLE
HELP NEEDED FOR BATHING: A LITTLE
MOVING TO AND FROM BED TO CHAIR: A LITTLE
DRESSING REGULAR LOWER BODY CLOTHING: A LITTLE
SUGGESTED CMS G CODE MODIFIER DAILY ACTIVITY: CJ
MOBILITY SCORE: 18
TURNING FROM BACK TO SIDE WHILE IN FLAT BAD: A LITTLE
SUGGESTED CMS G CODE MODIFIER DAILY ACTIVITY: CJ
CLIMB 3 TO 5 STEPS WITH RAILING: A LITTLE
WALKING IN HOSPITAL ROOM: A LITTLE
STANDING UP FROM CHAIR USING ARMS: A LITTLE
WALKING IN HOSPITAL ROOM: A LITTLE
MOBILITY SCORE: 17
TOILETING: A LITTLE
MOVING TO AND FROM BED TO CHAIR: A LITTLE
TURNING FROM BACK TO SIDE WHILE IN FLAT BAD: A LITTLE
HELP NEEDED FOR BATHING: A LITTLE
MOVING FROM LYING ON BACK TO SITTING ON SIDE OF FLAT BED: A LITTLE
TOILETING: A LITTLE
DAILY ACTIVITIY SCORE: 20
SUGGESTED CMS G CODE MODIFIER MOBILITY: CK
DRESSING REGULAR UPPER BODY CLOTHING: A LITTLE
CLIMB 3 TO 5 STEPS WITH RAILING: A LOT
STANDING UP FROM CHAIR USING ARMS: A LITTLE
DRESSING REGULAR LOWER BODY CLOTHING: A LITTLE
SUGGESTED CMS G CODE MODIFIER MOBILITY: CK

## 2023-02-08 ASSESSMENT — GAIT ASSESSMENTS
GAIT LEVEL OF ASSIST: CONTACT GUARD ASSIST
ASSISTIVE DEVICE: FRONT WHEEL WALKER
DISTANCE (FEET): 30

## 2023-02-08 ASSESSMENT — ACTIVITIES OF DAILY LIVING (ADL): TOILETING: INDEPENDENT

## 2023-02-08 ASSESSMENT — ENCOUNTER SYMPTOMS
DEPRESSION: 0
ABDOMINAL PAIN: 0
CHILLS: 0
MYALGIAS: 0
FEVER: 0
SHORTNESS OF BREATH: 0
EYE PAIN: 0
VOMITING: 0
NAUSEA: 0
WHEEZING: 0
HEADACHES: 0

## 2023-02-08 ASSESSMENT — PAIN DESCRIPTION - PAIN TYPE
TYPE: ACUTE PAIN

## 2023-02-08 ASSESSMENT — PATIENT HEALTH QUESTIONNAIRE - PHQ9
1. LITTLE INTEREST OR PLEASURE IN DOING THINGS: NOT AT ALL
2. FEELING DOWN, DEPRESSED, IRRITABLE, OR HOPELESS: NOT AT ALL
SUM OF ALL RESPONSES TO PHQ9 QUESTIONS 1 AND 2: 0

## 2023-02-08 NOTE — THERAPY
Occupational Therapy   Initial Evaluation     Patient Name: Fannie Driver  Age:  62 y.o., Sex:  female  Medical Record #: [id number]  Today's Date: 2/8/2023     Precautions  Precautions: (P) Fall Risk, Swallow Precautions ( See Comments)    Assessment    Patient is 62 y.o. female admitted for DKA, DMII, acute encephalopathy, oral abscess s/p teeth extraction. Pt normally independent with functional mobility and ADLs living in a [facility] with son and niece who are able to assist as needed. Pt able to complete functional mobility and ADLs with SBA with use of FWW, pt with generalized weakness but ultimately able to complete all functional tasks without physical assistance. Anticipate pt is at or near functional baseline, will recommend home health at discharge.      Plan    DC Equipment Recommendations: (P) None  Discharge Recommendations: (P) Recommend home health for continued occupational therapy services     Objective       02/08/23 1024   Prior Living Situation   Prior Services Intermittent Physical Support for ADL Per Family   Housing / Facility 1 Story House   Steps Into Home 3   Steps In Home 0   Bathroom Set up Bathtub / Shower Combination;Grab Bars;Shower Chair   Equipment Owned Front-Wheel Walker;Tub / Shower Seat   Lives with - Patient's Self Care Capacity Adult Children;Other (Comments)   Prior Level of ADL Function   Self Feeding Independent   Grooming / Hygiene Independent   Bathing Independent   Dressing Independent   Toileting Independent   History of Falls   History of Falls No   Precautions   Precautions Fall Risk;Swallow Precautions ( See Comments)   Pain 0 - 10 Group   Therapist Pain Assessment Post Activity Pain Same as Prior to Activity;Nurse Notified   Cognition    Cognition / Consciousness WDL   Level of Consciousness Alert   Strength Upper Body   Upper Body Strength  WDL   Sensation Upper Body   Upper Extremity Sensation  WDL   Upper Body Muscle Tone   Upper Body Muscle Tone  WDL   Neurological  Concerns   Neurological Concerns No   Coordination Upper Body   Coordination WDL   Balance Assessment   Sitting Balance (Static) Fair +   Sitting Balance (Dynamic) Fair   Standing Balance (Static) Fair   Standing Balance (Dynamic) Fair   Weight Shift Sitting Fair   Weight Shift Standing Fair   Comments w/ FWW   Bed Mobility    Supine to Sit Supervised   Sit to Supine Supervised   Scooting Supervised   ADL Assessment   Grooming Supervision   Upper Body Dressing Supervision   Lower Body Dressing Supervision   How much help from another person does the patient currently need...   Putting on and taking off regular lower body clothing? 3   Bathing (including washing, rinsing, and drying)? 3   Toileting, which includes using a toilet, bedpan, or urinal? 3   Putting on and taking off regular upper body clothing? 3   Taking care of personal grooming such as brushing teeth? 4   Eating meals? 4   6 Clicks Daily Activity Score 20   Functional Mobility   Sit to Stand Standby Assist   Bed, Chair, Wheelchair Transfer Contact Guard Assist   Toilet Transfers Standby Assist   Transfer Method Stand Step   Mobility bed mobility, bathroom mobility, back to bed   Comments w/ FWW   Activity Tolerance   Sitting Edge of Bed 5 min   Standing 5 min   Education Group   Education Provided Role of Occupational Therapist   Role of Occupational Therapist Patient Response Patient;Acceptance;Explanation   Problem List   Problem List None   Anticipated Discharge Equipment and Recommendations   DC Equipment Recommendations None   Discharge Recommendations Recommend home health for continued occupational therapy services   Interdisciplinary Plan of Care Collaboration   IDT Collaboration with  Nursing;Physical Therapist   Patient Position at End of Therapy In Bed;Bed Alarm On;Call Light within Reach;Tray Table within Reach;Phone within Reach   Collaboration Comments RN updated

## 2023-02-08 NOTE — DISCHARGE PLANNING
Case Management Discharge Planning    Admission Date: 2/3/2023  GMLOS: 9.6  ALOS: 5    6-Clicks ADL Score: 20  6-Clicks Mobility Score: 17  PT and/or OT Eval ordered: No  Post-acute Referrals Ordered: Yes  Post-acute Choice Obtained: Yes  Has referral(s) been sent to post-acute provider:  Yes      Anticipated Discharge Dispo: Discharge Disposition: D/T to home under [facility]HA care in anticipation of covered skilled care (06)    DME Needed: No    Action(s) Taken: Updated Provider/Nurse on Discharge Plan, Referral(s) sent, and DME Face to Face     Escalations Completed: None    Medically Clear: Yes    Next Steps: Order rec'd for HH. Pt lives in [place], [place]. Has Medicare and [employer] insurance. The only  agency that servies that area is [facility]. Referral to be sent by MATTY Gill. Await acceptance.   Per Dr Alvarez-pt to dc to home today.      Addendum at 12:30-Await PT and OT recs as pt can barely walk. Per Dr Alvarez-need therapy evals first since pt can barely walk- it is unlikely then that she can go home today with a ride all the way to [place], [place]. Need PT /OT and also [facility] acceptance. Pt seen by Diabetic educator Antonio and she states pt needs HH and should not dc today as just got out of ICU yesterday. Bllod sugar in the 300's.       Barriers to Discharge: Outpatient referrals pending    Is the patient up for discharge tomorrow: No

## 2023-02-08 NOTE — CARE PLAN
The patient is Stable - Low risk of patient condition declining or worsening    Shift Goals  Clinical Goals: abx, achs, safety, pain mgmt  Patient Goals: pain mgmt, sleep  Family Goals: bronson    Progress made toward(s) clinical / shift goals:    Problem: Knowledge Deficit - Standard  Goal: Patient and family/care givers will demonstrate understanding of plan of care, disease process/condition, diagnostic tests and medications  Outcome: Progressing     Problem: Pain - Standard  Goal: Alleviation of pain or a reduction in pain to the patient’s comfort goal  Outcome: Progressing     Problem: Fall Risk  Goal: Patient will remain free from falls  Outcome: Progressing       Patient is aware of plan of care during course of hospital stay. Pain will remain within patient's comfort zone. Hourly rounding in place. Bedside table and call light are within reach.

## 2023-02-08 NOTE — DISCHARGE INSTRUCTIONS
Oral and Maxillofacial Surgery Follow Up   - Upon discharge, call [facility] to follow up with Dr. Luong and/or Dr. Dukes   - [facility] - [phone]   - [address], [place]     - Rinse mouth with warm errol water 3x daily x 2 weeks   - Resume brushing your remaining teeth 2x/day   - Diet as tolerated   - No straws  x1 weeks     - Follow up with primary care physician in 1 week.   - Follow up with primary care physician regarding the duration of anticoagulation for history of DVT  - Follow up with Oral surgeon in a week, please call for appointment Dr. Luong,  [phone]    - Please take the medications as instructed    - Go to the local Emergency Department if you have any worsening condition.

## 2023-02-08 NOTE — DISCHARGE PLANNING
Agency/Facility Name: [facility]  Spoke To: Robyn  Outcome: The pt does have transport benefits with [employer].

## 2023-02-08 NOTE — PROGRESS NOTES
[facility] Medicine Daily Progress Note    Date of Service  2/8/2023    Chief Complaint  Fannie Driver is a 62 y.o. female admitted 2/3/2023 with facial abscess    Hospital Course  62 y.o. female who presented 2/3/2023 with facial abscess.  Ms. Driver has a past medical history of diabetes myelitis and prior deep venous thrombosis that was brought to the emergency room on 2/3/2023 by paramedics because of confusion and dental abscess.  In the emergency room she had incision and drainage of an oral abscess and oral surgery was consulted.  She was found to have a glucose of 764 bicarb of 3 and anion gap of 33 consistent with diabetic ketoacidosis.  Her white blood cell count was 18,000.  Lipase was greater than 3,000.  She was admitted to the intensive care unit on IV insulin drip with IV antibiotics and aggressive fluid resuscitation.  She went to the operating room on 2/6/2023 with incision and drainage of the left buccal space abscess and extraction of teeth 19, 20, and 21.  2/7: Ms. Driver was seen and evaluated in the intensive care unit.  She is a moderate though not excellent historian.  She admits that she was not taking of her medications prior to coming in and that she was indulging in methamphetamine. We discussed that she likely will need to go home on insulin.    Interval Problem Update  Patient reported mouth pain is much better  Patient reported weakness and barely can walk. Ordered PT OT    BG still high, 200-300, increase lantus to 35 units, continue ssi'  Ordered diabetes education    Patient came from [place], needs ride to go back    I have discussed this patient's plan of care and discharge plan at IDT rounds today with Case Management, Nursing, Nursing leadership, and other members of the IDT team.    Consultants/Specialty  critical care    Code Status  Full Code    Disposition  Patient is not medically cleared for discharge.   Anticipate discharge to to home with organized home healthcare and close  outpatient follow-up.  I have placed the appropriate orders for post-discharge needs.    Review of Systems  Review of Systems   Constitutional:  Negative for chills and fever.   HENT:          Mouth pain   Eyes:  Negative for pain.   Respiratory:  Negative for shortness of breath and wheezing.    Cardiovascular:  Negative for chest pain and leg swelling.   Gastrointestinal:  Negative for abdominal pain, nausea and vomiting.   Genitourinary:  Negative for dysuria and urgency.   Musculoskeletal:  Negative for myalgias.   Neurological:  Negative for headaches.   Psychiatric/Behavioral:  Negative for depression.       Physical Exam  Temp:  [36.2 °C (97.2 °F)-36.7 °C (98.1 °F)] 36.5 °C (97.7 °F)  Pulse:  [66-82] 82  Resp:  [16-18] 18  BP: (112-139)/(62-78) 139/78  SpO2:  [96 %-97 %] 96 %    Physical Exam  Constitutional:       Appearance: She is obese. She is ill-appearing.   HENT:      Head: Normocephalic.      Nose: Nose normal.      Comments: Facial tenderness  Eyes:      Extraocular Movements: Extraocular movements intact.      Conjunctiva/sclera: Conjunctivae normal.   Cardiovascular:      Rate and Rhythm: Normal rate and regular rhythm.      Pulses: Normal pulses.      Heart sounds: Normal heart sounds.   Pulmonary:      Effort: Pulmonary effort is normal.      Breath sounds: No wheezing or rales.   Abdominal:      General: Bowel sounds are normal.   Musculoskeletal:         General: No swelling or tenderness.      Cervical back: Normal range of motion and neck supple.   Skin:     General: Skin is warm.   Neurological:      Mental Status: She is alert and oriented to person, place, and time. Mental status is at baseline.   Psychiatric:         Mood and Affect: Mood normal.       Fluids    Intake/Output Summary (Last 24 hours) at 2/8/2023 1525  Last data filed at 2/8/2023 1400  Gross per 24 hour   Intake 960 ml   Output --   Net 960 ml       Laboratory  Recent Labs     02/06/23  0415 02/07/23  0455   WBC 8.8 6.7    RBC 3.77* 3.62*   HEMOGLOBIN 11.5* 11.0*   HEMATOCRIT 32.7* 32.3*   MCV 86.7 89.2   MCH 30.5 30.4   MCHC 35.2* 34.1   RDW 45.7 47.7   PLATELETCT 254 253   MPV 10.1 10.4     Recent Labs     02/07/23  0130 02/07/23  0455 02/07/23  0742   SODIUM 139 141 139   POTASSIUM 3.8 3.7 3.6   CHLORIDE 109 110 109   CO2 24 23 22   GLUCOSE 235* 213* 199*   BUN 15 15 15   CREATININE 0.54 0.61 0.55   CALCIUM 8.3* 8.4* 8.0*                   Imaging  US-EXTREMITY VENOUS LOWER BILAT   Final Result      CT-SOFT TISSUE NECK WITH   Final Result      1.  LEFT facial subcutaneous fluid collection measuring 3.5 cm.  Abscess is a possibility however there is no significant surrounding inflammatory changes   2.  Small LEFT anterior maxillary tooth abscess.   3.  Heterogeneous LEFT thyroid nodule measuring 18 mm.  Recommend follow-up nonemergent ultrasound.      US-RUQ   Final Result      1.  Prior cholecystectomy.   2.  Mild biliary dilation.   3.  Echogenic shadowing focus in the melania hepatis, possibly stone in the common hepatic duct.   4.  Limited evaluation of pancreas.      DX-CHEST-PORTABLE (1 VIEW)   Final Result      No acute cardiopulmonary disease.           Assessment/Plan  * DKA, type 2, not at goal (HCC)- (present on admission)  Assessment & Plan  Status post admission to the ICU for an IV insulin drip and aggressive IV fluids with electrolyte replacement  She has been placed on glargine 30 units daily with sliding scale and likely would be a good candidate for outpatient long-acting insulin with a relatively simple mealtime insulin such as 5 units of regular as opposed to sliding scale.    BG still high, 200-300, increase lantus to 35 units, continue ssi'  Ordered diabetes education    History of DVT (deep vein thrombosis)- (present on admission)  Assessment & Plan  Hx of  She was reportedly not taking anticoag  DVT study here is negative  She was started on Xarelto though it is not clear she needs it.    Acute  encephalopathy- (present on admission)  Assessment & Plan  Secondary to DKA and sepsis  This has resolved    Hyperphosphatemia- (present on admission)  Assessment & Plan  Replacement was given    Acute pancreatitis- (present on admission)  Assessment & Plan  Lipase was over 3,000 on admit and trended down to 79  Triglycerides were not very elevated    Oral abscess- (present on admission)  Assessment & Plan   She went to the operating room on 2/6/2023 with incision and drainage of the left buccal space abscess and extraction of teeth 19, 20, and 21.  IV unasyn    Transition to augmentin    Amphetamine use- (present on admission)  Assessment & Plan  Cessation discussed           VTE prophylaxis: therapeutic anticoagulation with xarelto    I have performed a physical exam and reviewed and updated ROS and Plan today (2/8/2023). In review of yesterday's note (2/7/2023), there are no changes except as documented above.

## 2023-02-08 NOTE — PROGRESS NOTES
HPI: Fannie Driver is a 62 y.o. female who presented to the ED with altered consciousness.  Patient has longstanding history of diabetes, and DVT.  Patient was brought in by EMS.  Patient significantly agitated, and unable to provide much history. Found to be in DKA and admitted for stabilization.  OMFS consulted for left sided facial swelling associated with carious teeth. Taken to OR on 2/6/23 for I&D of facial abscess and extraction of teeth.      Interval: Intraoral drain pulled today bedside.  Patient denies any dysphagia, dsypnea, foul taste, or sob.     Vitals:    02/07/23 1529   BP: 112/74   Pulse: 66   Resp: 18   Temp: 36.6 °C (97.8 °F)   SpO2: 96%     PE:   Gen: awake, alert and oriented  HEENT: NC/AT, PERRL, EOMI B, left sided swelling overlying mandibular body now improved. Border of mandible palpable from angle to angle, trachea midline  Max/Abi: gary 40 mm, fom soft, nt/nd, extraction sockets hemostatic    Recent Labs     02/05/23  0450 02/06/23  0415 02/07/23  0455   WBC 8.7 8.8 6.7   RBC 4.34 3.77* 3.62*   HEMOGLOBIN 13.3 11.5* 11.0*   HEMATOCRIT 39.6 32.7* 32.3*   MCV 91.2 86.7 89.2   MCH 30.6 30.5 30.4   RDW 47.6 45.7 47.7   PLATELETCT 284 254 253   MPV 10.5 10.1 10.4   NEUTSPOLYS 75.30*  --   --    LYMPHOCYTES 13.50*  --   --    MONOCYTES 9.00  --   --    EOSINOPHILS 1.40  --   --    BASOPHILS 0.30  --   --      Assessment: Fannie Driver is a 63 y/o female who presented to the hospital in DKA and with a left buccal odontogenic abscess, now POD #1 s/p I&D and extraction of teeth, improving on IV unasyn    Plan:   -No further intervention from OMFS standpoint  -Will require 7 days of post operative abx  -Recommend continued IV unasyn while in house  -Recommend augmentin 875 bid upon discharge and a peridex mouth rinse 5 ml TID x 1 week  -Ok to follow up with [facility], call for appointment.     Clinic phone number is [phone]    Call with questions or concerns cell:  [phone]  Steve Luong DDS, MD, FACS

## 2023-02-08 NOTE — THERAPY
"Physical Therapy   Initial Evaluation     Patient Name: Fannie Driver  Age:  62 y.o., Sex:  female  Medical Record #: [id number]  Today's Date: 2/8/2023     Precautions  Precautions: (P) Fall Risk;Swallow Precautions ( See Comments)    Assessment  Patient is 62 y.o. female with admitting diagnosis of DKA, altered mental status. PMHx including diabetes, methamphetamine use.     Pt is initially apprehensive regarding participation in therapy session due to fear of falling, CGA for sit to stand at EOB and ambulation with FWW in room, demonstrates good pacing and safety awareness during mobility. Pt declines further ambulation outside of room due to fatigue. Pt does not require further acute PT services at this time, is appropriate for DC home with family support when medically cleared. No DME needs at this time.       Plan    Physical Therapy Initial Treatment Plan   Duration: (P) Evaluation only    DC Equipment Recommendations: (P) None  Discharge Recommendations: (P) Anticipate that the patient will have no further physical therapy needs after discharge from the hospital       Subjective    \"I'm just scared I'm gonna fall\"     Objective       02/08/23 1021   Charge Group   PT Evaluation PT Evaluation Mod   Total Time Spent   PT Total Time Yes   PT Evaluation Time Spent (Mins) 15   PT Therapeutic Activities Time Spent (Mins) 10   PT Total Time Spent (Calculated) 25    Services   Is patient using  services for this encounter? No   Initial Contact Note    Initial Contact Note Order Received and Verified, Evaluation Only - Patient Does Not Require Further Acute Physical Therapy at this Time.  However, May Benefit from Post Acute Therapy for Higher Level Functional Deficits.   Precautions   Precautions Fall Risk;Swallow Precautions ( See Comments)   Vitals   O2 Delivery Device None - Room Air   Prior Living Situation   Prior Services Intermittent Physical Support for ADL Per Family   Housing / " Facility [facility]   Steps Into Home 3   Steps In Home 0   Equipment Owned Front-Wheel Walker;Grab Bar(s) In Tub / Shower;Tub / Shower Seat   Lives with - Patient's Self Care Capacity Adult Children;Other (Comments)  (adult niece)   Prior Level of Functional Mobility   Bed Mobility Independent   Transfer Status Independent   Ambulation Independent   Assistive Devices Used Front-Wheel Walker   Stairs Independent   History of Falls   History of Falls No   Cognition    Cognition / Consciousness WDL   Level of Consciousness Alert   Comments mildly self limiting   Strength Upper Body   Upper Body Strength  WDL   Strength Lower Body   Lower Body Strength  WDL   Comments for sit to stand and ambulation in room   Coordination Upper Body   Coordination WDL   Coordination Lower Body    Coordination Lower Body  WDL   Balance Assessment   Sitting Balance (Static) Fair +   Sitting Balance (Dynamic) Fair   Standing Balance (Static) Fair   Standing Balance (Dynamic) Fair -   Weight Shift Sitting Fair   Weight Shift Standing Fair   Bed Mobility    Supine to Sit Supervised   Sit to Supine Minimal Assist  (with L LE)   Gait Analysis   Gait Level Of Assist Contact Guard Assist   Assistive Device Front Wheel Walker   Distance (Feet) 30   # of Times Distance was Traveled 1   Functional Mobility   Sit to Stand Contact Guard Assist   Toilet Transfers Minimal Assist   Tub / Shower Transfers   (from low toilet)   Mobility bed mobility, ambulation to bathroom, ambulation in room   How much difficulty does the patient currently have...   Turning over in bed (including adjusting bedclothes, sheets and blankets)? 3   Sitting down on and standing up from a chair with arms (e.g., wheelchair, bedside commode, etc.) 3   Moving from lying on back to sitting on the side of the bed? 3   How much help from another person does the patient currently need...   Moving to and from a bed to a chair (including a wheelchair)? 3   Need to walk in a hospital  room? 3   Climbing 3-5 steps with a railing? 3   6 clicks Mobility Score 18   Activity Tolerance   Sitting in Chair NT   Sitting Edge of Bed 5 min   Standing 6 min total   Education Group   Education Provided Role of Physical Therapist;Gait Training;Transfer Status   Role of Physical Therapist Patient Response Patient;Acceptance;Explanation;Verbal Demonstration   Gait Training Patient Response Patient;Acceptance;Explanation;Verbal Demonstration;Action Demonstration   Transfer Status Patient Response Patient;Acceptance;Explanation;Action Demonstration   Physical Therapy Initial Treatment Plan    Duration Evaluation only   Problem List    Problems Impaired Balance;Decreased Activity Tolerance   Anticipated Discharge Equipment and Recommendations   DC Equipment Recommendations None   Discharge Recommendations Anticipate that the patient will have no further physical therapy needs after discharge from the hospital   Interdisciplinary Plan of Care Collaboration   IDT Collaboration with  Nursing;Occupational Therapist   Patient Position at End of Therapy Seated;Bed Alarm On;Call Light within Reach;Tray Table within Reach;Phone within Reach   Collaboration Comments RN updated   Session Information   Date / Session Number  2/8  eval only     Ashley Stratton DPT

## 2023-02-08 NOTE — DIETARY
Nutrition Services Brief Update:  Day 5 of admit.  Fannie Driver is a 62 y.o. female with admitting DX of DKA, type 2, not at goal (HCC) [E11.10]    T2DM consult received. Pt just received education yesterday. Completing consult at this time. No other education needs identified at this time. Consider referral to outpatient nutrition services for continuation of education as indicated or per pt preferences.     RD to monitor per department policy.

## 2023-02-08 NOTE — PROGRESS NOTES
Received report from ICU RN. Patient is A&Ox4, patient can be drowsy. Patient is on RA, VSS. Patient denies any pain at this time. POC discussed with patient, all questions answered. Patient had palomares removed at 0900 this am. She voided one time. Last bladder scan was 203. Bed is in lowest/locked position. Call light and belongings are within reach. Hourly rounding in place.

## 2023-02-08 NOTE — FACE TO FACE
Face to Face Supporting Documentation - Home Health    The encounter with this patient was in whole or in part the primary reason for home health admission.    Date of encounter:   Patient:                    MRN:                       YOB: 2022  Fannie Driver  [id number]  1960     Home health to see patient for:  Skilled Nursing care for assessment, interventions & education, Home health aide, Physical Therapy evaluation and treatment, and Occupational therapy evaluation and treatment    Skilled need for:  Comment: impaired ADLs    Skilled nursing interventions to include:  Comment: pt ot    Homebound status evidenced by:  Have a condition such that leaving his or her home is medically contraindicated. Leaving home requires a considerable and taxing effort. There is a normal inability to leave the home.    Community Physician to provide follow up care: Pcp Pt States None     Optional Interventions? No      I certify the face to face encounter for this home health care referral meets the CMS requirements and the encounter/clinical assessment with the patient was, in whole, or in part, for the medical condition(s) listed above, which is the primary reason for home health care. Based on my clinical findings: the service(s) are medically necessary, support the need for home health care, and the homebound criteria are met.  I certify that this patient has had a face to face encounter by myself.  Suni Alvarez M.D. - NPI: [id number]

## 2023-02-08 NOTE — DISCHARGE SUMMARY
Discharge Summary    CHIEF COMPLAINT ON ADMISSION  Chief Complaint   Patient presents with    ALOC       Reason for Admission  EMS     Admission Date  2/3/2023    CODE STATUS  Full Code    HPI & HOSPITAL COURSE  62 y.o. female who presented 2/3/2023 with facial abscess.  Ms. Driver has a past medical history of diabetes myelitis and prior deep venous thrombosis that was brought to the emergency room on 2/3/2023 by paramedics because of confusion and dental abscess.  In the emergency room she had incision and drainage of an oral abscess and oral surgery was consulted.  She was found to have a glucose of 764 bicarb of 3 and anion gap of 33 consistent with diabetic ketoacidosis.  Her white blood cell count was 18,000.  Lipase was greater than 3,000.  She was admitted to the intensive care unit on IV insulin drip with IV antibiotics and aggressive fluid resuscitation.  She went to the operating room on 2/6/2023 with incision and drainage of the left buccal space abscess and extraction of teeth 19, 20, and 21.  Patient will continue Augmentin for a week. patient was advised to follow up with Oral surgeon in a week, please call for appointment Dr. Luong,  [phone]    Patient will be discharged with Lantus 35 units and SSI.  Meds are delivered at the bedside.  Diabetes education was ordered and given at the bedside.  Acute pancreatitis resolved.    She admits that she was not taking of her medications prior to coming in and that she was indulging in methamphetamine.  Medication compliance and substance cessation education was given at bedside.     History of DVT not taking anticoagulant.  Xarelto started during this admission.  Patient was advised to follow-up with primary care physician for the duration.    PT OT evaluated, no needs.  Patient came from [place], ride was arranged.      Follow up with primary care physician regarding the duration of anticoagulation for history of DVT  Dr. Luong,   [phone]    Therefore, she is discharged in good and stable condition to home with organized home healthcare and close outpatient follow-up.    The patient met 2-midnight criteria for an inpatient stay at the time of discharge.    Discharge Date  2/9/2023    FOLLOW UP ITEMS POST DISCHARGE  - Follow up with primary care physician in 1 week.   - Follow up with primary care physician regarding the duration of anticoagulation for history of DVT  - Follow up with Oral surgeon in a week, please call for appointment Dr. Luong,  [phone]    - Please take the medications as instructed    - Go to the local Emergency Department if you have any worsening condition.       DISCHARGE DIAGNOSES  Principal Problem:    DKA, type 2, not at goal (HCC) POA: Yes  Active Problems:    Amphetamine use POA: Yes    MAICOL (acute kidney injury) ([facility]) POA: Yes    Hyponatremia POA: Yes    Oral abscess POA: Yes    Acute pancreatitis POA: Yes    Hyperphosphatemia POA: Yes    Acute encephalopathy POA: Yes    Electrolyte disturbance POA: Yes    History of DVT (deep vein thrombosis) POA: Yes  Resolved Problems:    DVT (deep venous thrombosis) ([facility]) POA: Yes      FOLLOW UP  - Follow up with primary care physician in 1 week.   - Follow up with Oral surgeon in a week, please call for appointment Dr. Luong,  [phone]    [facility]  [address] [address]  [phone]  Call      Phillip Luong D.D.S.  [address] [address]  [phone]    Call in 1 week(s)  follow up recommended by specialist      MEDICATIONS ON DISCHARGE     Medication List        START taking these medications        Instructions   amoxicillin-clavulanate 500-125 MG Tabs  Commonly known as: AUGMENTIN   Take 1 Tablet by mouth every 8 hours for 7 days.  Dose: 1 Tablet     BD Pen Needle Supriya U/F  Generic drug: Insulin Pen Needle 32 G x 4 mm   Doctor's comments: Per formulary preference. ICD-10 code: E11.65 Uncontrolled  type 2 Diabetes Mellitus  Use one pen needle to inject insulin three times daily.     * insulin lispro 100 UNIT/ML Sopn injection PEN  Commonly known as: HumaLOG KwikPen   Inject 3-14 Units under the skin 4 Times a Day,Before Meals and at Bedtime for 30 days. 151 - 200  mg/dL =    3 Units  201 - 250  mg/dL =    4 Units  251 - 300  mg/dL  =   7 Units  301 - 350  mg/dL  =   10 Units  351 - 400 mg/dL   =   12 Units  Over 400 mg/dL   =   14 Units  Dose: 3-14 Units     * insulin lispro 100 UNIT/ML Sopn injection PEN  Commonly known as: HumaLOG KwikPen   Doctor's comments: 151 - 200  mg/dL =    3 Units  201 - 250  mg/dL =    4 Units  251 - 300  mg/dL  =   7 Units  301 - 350  mg/dL  =   10 Units  351 - 400 mg/dL   =   12 Units  Over 400 mg/dL   =   14 Units  Inject 3-14 Units under the skin 3 times a day before meals for 30 days.  151 - 200  mg/dL =    3 Units  201 - 250  mg/dL =    4 Units  251 - 300  mg/dL  =   7 Units  301 - 350  mg/dL  =   10 Units  351 - 400 mg/dL   =   12 Units  Over 400 mg/dL   =   14 Units  Dose: 3-14 Units     Lantus SoloStar 100 UNIT/ML Sopn injection  Generic drug: insulin glargine   Inject 30 Units under the skin every morning for 30 days.  Dose: 30 Units     rivaroxaban 20 MG Tabs tablet  Commonly known as: XARELTO   Take 1 Tablet by mouth with dinner for 30 days.  Dose: 20 mg           * This list has 2 medication(s) that are the same as other medications prescribed for you. Read the directions carefully, and ask your doctor or other care provider to review them with you.                  Allergies  No Known Allergies    DIET  Orders Placed This Encounter   Procedures    Diet Order Diet: Level 5 - Minced and Moist; Liquid level: Level 0 - Thin     Standing Status:   Standing     Number of Occurrences:   1     Order Specific Question:   Diet:     Answer:   Level 5 - Minced and Moist [24]     Order Specific Question:   Liquid level     Answer:   Level 0 - Thin       ACTIVITY  As  tolerated.  Weight bearing as tolerated    CONSULTATIONS  Critical care, oral surgeon    PROCEDURES  2/6/2023 with incision and drainage of the left buccal space abscess and extraction of teeth 19, 20, and 21.    LABORATORY  Lab Results   Component Value Date    SODIUM 138 02/09/2023    POTASSIUM 3.4 (L) 02/09/2023    CHLORIDE 104 02/09/2023    CO2 24 02/09/2023    GLUCOSE 181 (H) 02/09/2023    BUN 10 02/09/2023    CREATININE 0.57 02/09/2023        Lab Results   Component Value Date    WBC 7.5 02/09/2023    HEMOGLOBIN 11.8 (L) 02/09/2023    HEMATOCRIT 34.7 (L) 02/09/2023    PLATELETCT 262 02/09/2023        Total time of the discharge process exceeds 35 minutes.

## 2023-02-09 ENCOUNTER — PHARMACY VISIT (OUTPATIENT)
Dept: PHARMACY | Facility: MEDICAL CENTER | Age: 63
End: 2023-02-09
Payer: COMMERCIAL

## 2023-02-09 VITALS
HEART RATE: 78 BPM | WEIGHT: 205.91 LBS | OXYGEN SATURATION: 95 % | HEIGHT: 68 IN | TEMPERATURE: 97.7 F | RESPIRATION RATE: 18 BRPM | DIASTOLIC BLOOD PRESSURE: 76 MMHG | SYSTOLIC BLOOD PRESSURE: 138 MMHG | BODY MASS INDEX: 31.21 KG/M2

## 2023-02-09 LAB
ANION GAP SERPL CALC-SCNC: 10 MMOL/L (ref 7–16)
BUN SERPL-MCNC: 10 MG/DL (ref 8–22)
CALCIUM SERPL-MCNC: 8.8 MG/DL (ref 8.5–10.5)
CHLORIDE SERPL-SCNC: 104 MMOL/L (ref 96–112)
CO2 SERPL-SCNC: 24 MMOL/L (ref 20–33)
CREAT SERPL-MCNC: 0.57 MG/DL (ref 0.5–1.4)
ERYTHROCYTE [DISTWIDTH] IN BLOOD BY AUTOMATED COUNT: 44.4 FL (ref 35.9–50)
GFR SERPLBLD CREATININE-BSD FMLA CKD-EPI: 103 ML/MIN/1.73 M 2
GLUCOSE BLD STRIP.AUTO-MCNC: 186 MG/DL (ref 65–99)
GLUCOSE BLD STRIP.AUTO-MCNC: 195 MG/DL (ref 65–99)
GLUCOSE SERPL-MCNC: 181 MG/DL (ref 65–99)
HCT VFR BLD AUTO: 34.7 % (ref 37–47)
HGB BLD-MCNC: 11.8 G/DL (ref 12–16)
MCH RBC QN AUTO: 30.3 PG (ref 27–33)
MCHC RBC AUTO-ENTMCNC: 34 G/DL (ref 33.6–35)
MCV RBC AUTO: 89 FL (ref 81.4–97.8)
PLATELET # BLD AUTO: 262 K/UL (ref 164–446)
PMV BLD AUTO: 9.8 FL (ref 9–12.9)
POTASSIUM SERPL-SCNC: 3.4 MMOL/L (ref 3.6–5.5)
RBC # BLD AUTO: 3.9 M/UL (ref 4.2–5.4)
SODIUM SERPL-SCNC: 138 MMOL/L (ref 135–145)
WBC # BLD AUTO: 7.5 K/UL (ref 4.8–10.8)

## 2023-02-09 PROCEDURE — A9270 NON-COVERED ITEM OR SERVICE: HCPCS | Performed by: HOSPITALIST

## 2023-02-09 PROCEDURE — 700102 HCHG RX REV CODE 250 W/ 637 OVERRIDE(OP): Performed by: STUDENT IN AN ORGANIZED HEALTH CARE EDUCATION/TRAINING PROGRAM

## 2023-02-09 PROCEDURE — 85027 COMPLETE CBC AUTOMATED: CPT

## 2023-02-09 PROCEDURE — 99239 HOSP IP/OBS DSCHRG MGMT >30: CPT | Performed by: STUDENT IN AN ORGANIZED HEALTH CARE EDUCATION/TRAINING PROGRAM

## 2023-02-09 PROCEDURE — 700102 HCHG RX REV CODE 250 W/ 637 OVERRIDE(OP): Performed by: EMERGENCY MEDICINE

## 2023-02-09 PROCEDURE — A9270 NON-COVERED ITEM OR SERVICE: HCPCS | Performed by: EMERGENCY MEDICINE

## 2023-02-09 PROCEDURE — 700102 HCHG RX REV CODE 250 W/ 637 OVERRIDE(OP): Performed by: HOSPITALIST

## 2023-02-09 PROCEDURE — A9270 NON-COVERED ITEM OR SERVICE: HCPCS | Performed by: STUDENT IN AN ORGANIZED HEALTH CARE EDUCATION/TRAINING PROGRAM

## 2023-02-09 PROCEDURE — 82962 GLUCOSE BLOOD TEST: CPT

## 2023-02-09 PROCEDURE — 80048 BASIC METABOLIC PNL TOTAL CA: CPT

## 2023-02-09 RX ORDER — POTASSIUM CHLORIDE 20 MEQ/1
40 TABLET, EXTENDED RELEASE ORAL ONCE
Status: COMPLETED | OUTPATIENT
Start: 2023-02-09 | End: 2023-02-09

## 2023-02-09 RX ADMIN — INSULIN HUMAN 3 UNITS: 100 INJECTION, SOLUTION PARENTERAL at 08:37

## 2023-02-09 RX ADMIN — ACETAMINOPHEN 1000 MG: 500 TABLET, FILM COATED ORAL at 12:20

## 2023-02-09 RX ADMIN — INSULIN HUMAN 3 UNITS: 100 INJECTION, SOLUTION PARENTERAL at 12:19

## 2023-02-09 RX ADMIN — AMOXICILLIN AND CLAVULANATE POTASSIUM 1 TABLET: 500; 125 TABLET, FILM COATED ORAL at 05:43

## 2023-02-09 RX ADMIN — POTASSIUM CHLORIDE 40 MEQ: 1500 TABLET, EXTENDED RELEASE ORAL at 08:38

## 2023-02-09 RX ADMIN — ACETAMINOPHEN 1000 MG: 500 TABLET, FILM COATED ORAL at 05:43

## 2023-02-09 ASSESSMENT — PAIN DESCRIPTION - PAIN TYPE: TYPE: ACUTE PAIN

## 2023-02-09 NOTE — PROGRESS NOTES
Pt DC'd. IV removed, discharge instructions provided to patient, pt verbalizes understanding. Pt states all questions have been answered. Pt also seen by diabetes educator prior to DC. Copy of discharge paperwork provided to pt, signed copy in chart. Pt states all belongings in possession. Pt escorted off unit by CNA without incident, picked up by son.

## 2023-02-09 NOTE — PROGRESS NOTES
Pt states she has a ride coming for her and they will be here in 40 minutes. [id number] delivered to this RN. Will send medications with pt. Diabetic educator notified that pt is being transferred to discharge lounge.

## 2023-02-09 NOTE — CONSULTS
Diabetes education: Pt has a hx of diabetes. States she uses Humalog, Metformin and Lantus but has Trulicity in the refrigerator and thinks she takes it. Pt states she uses the Freestyle jagdish at home.  Pt was admitted with abscess and blood sugar of 745 with Hga1c of 13.7%.  Pt is currently on Glargine 35 units in AM ( increased from 30 to start tomorrow), and regular insulin per sliding scale coverage ac and hs with blood sugars of 211 ( 4 units), 316 ( 10 units), and 267 ( 7 units).  Met with pt this afternoon to discuss diabetes. Pt states she has used insulin pens before. Reviewed insulin, pens, insulin storage, shelf life and site rotation. Handout given.  Pt vague about how she manages her diabetes, states at times before admit blood sugars were in the 700's. Discussed what effects blood sugars and goals for blood sugars.  Reviewed high and low blood sugars, and need for protein with carbohydrates and why.  Plan: Pt has two orders for lispro for discharge with difference being ac and hs or ac only ( pt should not take fast acting insulin at hs only ac). Please have pt  give her insulin with nursing before discharge.

## 2023-02-09 NOTE — PROGRESS NOTES
Received bedside report from night shift RN. Patient is A&Ox4, Patient has a flat affect. Patient is on RA, VSS. Patient denies any pain at this time. Patient has been getting up to chair for meals today. She has attempted to use the toilet, but has been unable to make it in the bathroom before an incontinent episode. POC discussed with patient, all questions answered. No further needs at this time. Bed is in lowest/locked position. Call light and belongings are within reach. Hourly rounding in place.

## 2023-02-09 NOTE — PROGRESS NOTES
Patient continues to state that her son is picking her up.  No one here to pick patient up at this time.  Attempted to call telephone numbers listed in the chart without success.  Called to S6 charge RN to inquire about transport home for patient.

## 2023-02-09 NOTE — CARE PLAN
The patient is Stable - Low risk of patient condition declining or worsening    Shift Goals  Clinical Goals: safety, monitor BG  Patient Goals: comfort, sleep  Family Goals: bronson    Progress made toward(s) clinical / shift goals:    Problem: Knowledge Deficit - Standard  Goal: Patient and family/care givers will demonstrate understanding of plan of care, disease process/condition, diagnostic tests and medications  Outcome: Progressing     Problem: Pain - Standard  Goal: Alleviation of pain or a reduction in pain to the patient’s comfort goal  Outcome: Progressing     Problem: Fall Risk  Goal: Patient will remain free from falls  Outcome: Progressing       Patient is aware of plan of care during course of hospital stay. Pain will remain within patient's comfort zone. Hourly rounding in place. Bedside table and call light are within reach.

## 2023-02-09 NOTE — PROGRESS NOTES
Diabetes education: Met with pt this afternoon. Please see consult note.  Plan: Pt has two orders for lispro for discharge with difference being ac and hs or ac only ( pt should not take fast acting insulin at hs only ac). Please have pt  give her insulin with nursing before discharge.

## 2023-02-09 NOTE — DISCHARGE PLANNING
Case Management Discharge Planning    Admission Date: 2/3/2023  GMLOS: 9.6  ALOS: 6    6-Clicks ADL Score: 20  6-Clicks Mobility Score: 18      Anticipated Discharge Dispo: Discharge Disposition: D/T to home under [facility]HA care in anticipation of covered skilled care (06)    DME Needed: No    Action(s) Taken: OTHER: Pt planned to discharge home today and needs transport back home. Pt confirmed [place] address. HANNAH RN called [facility] at [phone] to schedule transport. Pickup set for [facility] pickup location. MTM unable to find transport at this time and will continue to work on setting it up and call CM RN back when transport has been scheduled.     1124: HANNAH RN called [facility] to inquire about progress of transport setup. CM RN spoke with Britany with [facility], informed transport is being worked on and to call back in 1 hour to check on progress.     1246: HANNAH RN called [facility] about ride, no updates. Escalated to leadership.     1307: HANNAH RN oked by leadership to set pt up with [employer] transport, CM RN reached out to  to set up transport for 1400. Awaiting response.     1331: CM RN received notification from bedside RN [facility] has set up transport for pt.     Escalations Completed: Provider and Bedside RN    Medically Clear: Yes    Next Steps: CM RN to notify pt of transport time    Barriers to Discharge: Transportation    Is the patient up for discharge tomorrow: No

## 2023-02-09 NOTE — PROGRESS NOTES
Meds to beds delivered to bedside RN.  Informed bedside RN to place medications into the refrigerator due to insulin contained therein. Advised bedside RN to call diabetes educator, Kayla KENNEDY, prior to patient being discharged.  Patient continues to wait for transport to be set up through [facility].

## 2023-02-10 LAB
BACTERIA SPEC ANAEROBE CULT: NORMAL
BACTERIA WND AEROBE CULT: ABNORMAL
GRAM STN SPEC: ABNORMAL
SIGNIFICANT IND 70042: ABNORMAL
SIGNIFICANT IND 70042: NORMAL
SITE SITE: ABNORMAL
SITE SITE: NORMAL
SOURCE SOURCE: ABNORMAL
SOURCE SOURCE: NORMAL

## 2023-02-10 NOTE — PROGRESS NOTES
Diabetes education: Pt was seen 2/9, at discharge lounge before discharge. CDE confirmed discharge insulin and orders, clarified need for sliding scale ac only and reviewed numbers. CDE asked MA to get ice pack for insulin as pt has over hour drive. Pt left proximal to discharge lounge awaiting son. Questions answered.

## 2023-02-12 NOTE — DISCHARGE PLANNING
Agency[facility]ility Name: [facility]   Outcome: Per faxed response, no opening until late week of 2/13/23.

## 2023-02-15 NOTE — DISCHARGE PLANNING
Agency[facility]Facility Name: [facility]  Spoke To: Marly[facility]  Outcome: [facility] did receive the pt's discharge summary.  Referral is accepted and [facility] will get in touch with the pt.

## 2023-02-28 LAB
FUNGUS SPEC CULT: ABNORMAL
FUNGUS SPEC FUNGUS STN: ABNORMAL
SIGNIFICANT IND 70042: ABNORMAL
SITE SITE: ABNORMAL
SOURCE SOURCE: ABNORMAL

## 2023-09-23 ENCOUNTER — APPOINTMENT (OUTPATIENT)
Dept: RADIOLOGY | Facility: MEDICAL CENTER | Age: 63
DRG: 638 | End: 2023-09-23
Attending: HOSPITALIST
Payer: MEDICARE

## 2023-09-23 ENCOUNTER — HOSPITAL ENCOUNTER (INPATIENT)
Facility: MEDICAL CENTER | Age: 63
LOS: 4 days | DRG: 638 | End: 2023-09-27
Attending: HOSPITALIST | Admitting: HOSPITALIST
Payer: MEDICARE

## 2023-09-23 DIAGNOSIS — Z86.718 HISTORY OF DVT (DEEP VEIN THROMBOSIS): ICD-10-CM

## 2023-09-23 DIAGNOSIS — E83.39 HYPERPHOSPHATEMIA: ICD-10-CM

## 2023-09-23 DIAGNOSIS — D72.828 OTHER ELEVATED WHITE BLOOD CELL (WBC) COUNT: ICD-10-CM

## 2023-09-23 DIAGNOSIS — E87.6 HYPOKALEMIA: ICD-10-CM

## 2023-09-23 DIAGNOSIS — G93.40 ACUTE ENCEPHALOPATHY: ICD-10-CM

## 2023-09-23 DIAGNOSIS — N17.9 AKI (ACUTE KIDNEY INJURY) (HCC): ICD-10-CM

## 2023-09-23 DIAGNOSIS — E11.10 DKA, TYPE 2, NOT AT GOAL (HCC): ICD-10-CM

## 2023-09-23 DIAGNOSIS — K12.2 ORAL ABSCESS: ICD-10-CM

## 2023-09-23 DIAGNOSIS — E87.1 HYPONATREMIA: ICD-10-CM

## 2023-09-23 DIAGNOSIS — K85.90 ACUTE PANCREATITIS, UNSPECIFIED COMPLICATION STATUS, UNSPECIFIED PANCREATITIS TYPE: ICD-10-CM

## 2023-09-23 DIAGNOSIS — N76.2 VULVAL CELLULITIS: ICD-10-CM

## 2023-09-23 DIAGNOSIS — F15.90 AMPHETAMINE USE: ICD-10-CM

## 2023-09-23 DIAGNOSIS — E87.8 ELECTROLYTE DISTURBANCE: ICD-10-CM

## 2023-09-23 PROBLEM — D72.829 LEUKOCYTOSIS: Status: ACTIVE | Noted: 2023-09-23

## 2023-09-23 LAB
ALBUMIN SERPL BCP-MCNC: 3.1 G/DL (ref 3.2–4.9)
ALBUMIN/GLOB SERPL: 0.7 G/DL
ALP SERPL-CCNC: 163 U/L (ref 30–99)
ALT SERPL-CCNC: 10 U/L (ref 2–50)
ANION GAP SERPL CALC-SCNC: 13 MMOL/L (ref 7–16)
ANION GAP SERPL CALC-SCNC: 14 MMOL/L (ref 7–16)
ANION GAP SERPL CALC-SCNC: 22 MMOL/L (ref 7–16)
ANION GAP SERPL CALC-SCNC: 29 MMOL/L (ref 7–16)
APPEARANCE UR: CLEAR
AST SERPL-CCNC: 10 U/L (ref 12–45)
BACTERIA #/AREA URNS HPF: ABNORMAL /HPF
BASOPHILS # BLD AUTO: 0 % (ref 0–1.8)
BASOPHILS # BLD: 0 K/UL (ref 0–0.12)
BILIRUB SERPL-MCNC: 0.2 MG/DL (ref 0.1–1.5)
BILIRUB UR QL STRIP.AUTO: ABNORMAL
BUN SERPL-MCNC: 25 MG/DL (ref 8–22)
BUN SERPL-MCNC: 28 MG/DL (ref 8–22)
BUN SERPL-MCNC: 32 MG/DL (ref 8–22)
BUN SERPL-MCNC: 35 MG/DL (ref 8–22)
CALCIUM ALBUM COR SERPL-MCNC: 11 MG/DL (ref 8.5–10.5)
CALCIUM SERPL-MCNC: 10.3 MG/DL (ref 8.4–10.2)
CALCIUM SERPL-MCNC: 9 MG/DL (ref 8.4–10.2)
CALCIUM SERPL-MCNC: 9.2 MG/DL (ref 8.4–10.2)
CALCIUM SERPL-MCNC: 9.5 MG/DL (ref 8.4–10.2)
CHLORIDE SERPL-SCNC: 105 MMOL/L (ref 96–112)
CHLORIDE SERPL-SCNC: 105 MMOL/L (ref 96–112)
CHLORIDE SERPL-SCNC: 108 MMOL/L (ref 96–112)
CHLORIDE SERPL-SCNC: 98 MMOL/L (ref 96–112)
CO2 SERPL-SCNC: 10 MMOL/L (ref 20–33)
CO2 SERPL-SCNC: 15 MMOL/L (ref 20–33)
CO2 SERPL-SCNC: 16 MMOL/L (ref 20–33)
CO2 SERPL-SCNC: 8 MMOL/L (ref 20–33)
COLOR UR: YELLOW
CREAT SERPL-MCNC: 0.86 MG/DL (ref 0.5–1.4)
CREAT SERPL-MCNC: 0.97 MG/DL (ref 0.5–1.4)
CREAT SERPL-MCNC: 1.12 MG/DL (ref 0.5–1.4)
CREAT SERPL-MCNC: 1.29 MG/DL (ref 0.5–1.4)
EOSINOPHIL # BLD AUTO: 0 K/UL (ref 0–0.51)
EOSINOPHIL NFR BLD: 0 % (ref 0–6.9)
EPI CELLS #/AREA URNS HPF: ABNORMAL /HPF
ERYTHROCYTE [DISTWIDTH] IN BLOOD BY AUTOMATED COUNT: 40.7 FL (ref 35.9–50)
GFR SERPLBLD CREATININE-BSD FMLA CKD-EPI: 47 ML/MIN/1.73 M 2
GFR SERPLBLD CREATININE-BSD FMLA CKD-EPI: 56 ML/MIN/1.73 M 2
GFR SERPLBLD CREATININE-BSD FMLA CKD-EPI: 66 ML/MIN/1.73 M 2
GFR SERPLBLD CREATININE-BSD FMLA CKD-EPI: 76 ML/MIN/1.73 M 2
GLOBULIN SER CALC-MCNC: 4.5 G/DL (ref 1.9–3.5)
GLUCOSE BLD STRIP.AUTO-MCNC: 101 MG/DL (ref 65–99)
GLUCOSE BLD STRIP.AUTO-MCNC: 103 MG/DL (ref 65–99)
GLUCOSE BLD STRIP.AUTO-MCNC: 103 MG/DL (ref 65–99)
GLUCOSE BLD STRIP.AUTO-MCNC: 113 MG/DL (ref 65–99)
GLUCOSE BLD STRIP.AUTO-MCNC: 114 MG/DL (ref 65–99)
GLUCOSE BLD STRIP.AUTO-MCNC: 139 MG/DL (ref 65–99)
GLUCOSE BLD STRIP.AUTO-MCNC: 145 MG/DL (ref 65–99)
GLUCOSE BLD STRIP.AUTO-MCNC: 186 MG/DL (ref 65–99)
GLUCOSE BLD STRIP.AUTO-MCNC: 214 MG/DL (ref 65–99)
GLUCOSE BLD STRIP.AUTO-MCNC: 218 MG/DL (ref 65–99)
GLUCOSE BLD STRIP.AUTO-MCNC: 272 MG/DL (ref 65–99)
GLUCOSE BLD STRIP.AUTO-MCNC: 294 MG/DL (ref 65–99)
GLUCOSE BLD STRIP.AUTO-MCNC: 318 MG/DL (ref 65–99)
GLUCOSE BLD STRIP.AUTO-MCNC: 344 MG/DL (ref 65–99)
GLUCOSE BLD STRIP.AUTO-MCNC: 395 MG/DL (ref 65–99)
GLUCOSE BLD STRIP.AUTO-MCNC: 89 MG/DL (ref 65–99)
GLUCOSE BLD STRIP.AUTO-MCNC: 90 MG/DL (ref 65–99)
GLUCOSE SERPL-MCNC: 126 MG/DL (ref 65–99)
GLUCOSE SERPL-MCNC: 249 MG/DL (ref 65–99)
GLUCOSE SERPL-MCNC: 348 MG/DL (ref 65–99)
GLUCOSE SERPL-MCNC: 86 MG/DL (ref 65–99)
GLUCOSE UR STRIP.AUTO-MCNC: 500 MG/DL
GRAN CASTS #/AREA URNS LPF: ABNORMAL /LPF
HCT VFR BLD AUTO: 42.5 % (ref 37–47)
HGB BLD-MCNC: 14.7 G/DL (ref 12–16)
HYALINE CASTS #/AREA URNS LPF: ABNORMAL /LPF
KETONES UR STRIP.AUTO-MCNC: >=80 MG/DL
LACTATE SERPL-SCNC: 2 MMOL/L (ref 0.5–2)
LEUKOCYTE ESTERASE UR QL STRIP.AUTO: NEGATIVE
LYMPHOCYTES # BLD AUTO: 1.89 K/UL (ref 1–4.8)
LYMPHOCYTES NFR BLD: 9 % (ref 22–41)
MAGNESIUM SERPL-MCNC: 1.5 MG/DL (ref 1.5–2.5)
MAGNESIUM SERPL-MCNC: 1.7 MG/DL (ref 1.5–2.5)
MAGNESIUM SERPL-MCNC: 1.9 MG/DL (ref 1.5–2.5)
MANUAL DIFF BLD: ABNORMAL
MCH RBC QN AUTO: 30 PG (ref 27–33)
MCHC RBC AUTO-ENTMCNC: 34.6 G/DL (ref 32.2–35.5)
MCV RBC AUTO: 86.7 FL (ref 81.4–97.8)
MICRO URNS: ABNORMAL
MONOCYTES # BLD AUTO: 2.1 K/UL (ref 0–0.85)
MONOCYTES NFR BLD AUTO: 10 % (ref 0–13.4)
MUCOUS THREADS #/AREA URNS HPF: ABNORMAL /HPF
MYELOCYTES NFR BLD MANUAL: 2 %
NEUTROPHILS # BLD AUTO: 16.59 K/UL (ref 1.82–7.42)
NEUTROPHILS NFR BLD: 68 % (ref 44–72)
NEUTS BAND NFR BLD MANUAL: 11 % (ref 0–10)
NITRITE UR QL STRIP.AUTO: NEGATIVE
NRBC # BLD AUTO: 0 K/UL
NRBC BLD-RTO: 0 /100 WBC (ref 0–0.2)
PH UR STRIP.AUTO: 5.5 [PH] (ref 5–8)
PHOSPHATE SERPL-MCNC: 1.6 MG/DL (ref 2.5–4.5)
PHOSPHATE SERPL-MCNC: 2.4 MG/DL (ref 2.5–4.5)
PHOSPHATE SERPL-MCNC: 2.6 MG/DL (ref 2.5–4.5)
PLATELET # BLD AUTO: 379 K/UL (ref 164–446)
PLATELET BLD QL SMEAR: NORMAL
PMV BLD AUTO: 10.5 FL (ref 9–12.9)
POTASSIUM SERPL-SCNC: 3.3 MMOL/L (ref 3.6–5.5)
POTASSIUM SERPL-SCNC: 3.5 MMOL/L (ref 3.6–5.5)
POTASSIUM SERPL-SCNC: 3.9 MMOL/L (ref 3.6–5.5)
POTASSIUM SERPL-SCNC: 4.1 MMOL/L (ref 3.6–5.5)
PROCALCITONIN SERPL-MCNC: 2.01 NG/ML
PROT SERPL-MCNC: 7.6 G/DL (ref 6–8.2)
PROT UR QL STRIP: 30 MG/DL
RBC # BLD AUTO: 4.9 M/UL (ref 4.2–5.4)
RBC # URNS HPF: ABNORMAL /HPF
RBC BLD AUTO: NORMAL
RBC UR QL AUTO: ABNORMAL
SODIUM SERPL-SCNC: 135 MMOL/L (ref 135–145)
SODIUM SERPL-SCNC: 135 MMOL/L (ref 135–145)
SODIUM SERPL-SCNC: 136 MMOL/L (ref 135–145)
SODIUM SERPL-SCNC: 137 MMOL/L (ref 135–145)
SP GR UR STRIP.AUTO: 1.02
TOXIC GRANULES BLD QL SMEAR: NORMAL
UNIDENT CRYS URNS QL MICRO: ABNORMAL /HPF
WBC # BLD AUTO: 21 K/UL (ref 4.8–10.8)
WBC #/AREA URNS HPF: ABNORMAL /HPF
WBC TOXIC VACUOLES BLD QL SMEAR: NORMAL

## 2023-09-23 PROCEDURE — 71045 X-RAY EXAM CHEST 1 VIEW: CPT

## 2023-09-23 PROCEDURE — 87040 BLOOD CULTURE FOR BACTERIA: CPT

## 2023-09-23 PROCEDURE — 80048 BASIC METABOLIC PNL TOTAL CA: CPT | Mod: 91

## 2023-09-23 PROCEDURE — 36415 COLL VENOUS BLD VENIPUNCTURE: CPT

## 2023-09-23 PROCEDURE — 84100 ASSAY OF PHOSPHORUS: CPT | Mod: 91

## 2023-09-23 PROCEDURE — 81001 URINALYSIS AUTO W/SCOPE: CPT

## 2023-09-23 PROCEDURE — 85007 BL SMEAR W/DIFF WBC COUNT: CPT

## 2023-09-23 PROCEDURE — 82962 GLUCOSE BLOOD TEST: CPT | Mod: 91

## 2023-09-23 PROCEDURE — 99291 CRITICAL CARE FIRST HOUR: CPT | Performed by: HOSPITALIST

## 2023-09-23 PROCEDURE — 700105 HCHG RX REV CODE 258: Performed by: INTERNAL MEDICINE

## 2023-09-23 PROCEDURE — 700102 HCHG RX REV CODE 250 W/ 637 OVERRIDE(OP): Performed by: INTERNAL MEDICINE

## 2023-09-23 PROCEDURE — 83605 ASSAY OF LACTIC ACID: CPT

## 2023-09-23 PROCEDURE — 700111 HCHG RX REV CODE 636 W/ 250 OVERRIDE (IP): Mod: JZ | Performed by: HOSPITALIST

## 2023-09-23 PROCEDURE — 99291 CRITICAL CARE FIRST HOUR: CPT | Performed by: INTERNAL MEDICINE

## 2023-09-23 PROCEDURE — 80053 COMPREHEN METABOLIC PANEL: CPT

## 2023-09-23 PROCEDURE — 51798 US URINE CAPACITY MEASURE: CPT

## 2023-09-23 PROCEDURE — 85025 COMPLETE CBC W/AUTO DIFF WBC: CPT

## 2023-09-23 PROCEDURE — 770022 HCHG ROOM/CARE - ICU (200)

## 2023-09-23 PROCEDURE — 700105 HCHG RX REV CODE 258: Performed by: HOSPITALIST

## 2023-09-23 PROCEDURE — 700111 HCHG RX REV CODE 636 W/ 250 OVERRIDE (IP): Mod: JZ | Performed by: INTERNAL MEDICINE

## 2023-09-23 PROCEDURE — 84145 PROCALCITONIN (PCT): CPT

## 2023-09-23 PROCEDURE — 83735 ASSAY OF MAGNESIUM: CPT

## 2023-09-23 PROCEDURE — 700101 HCHG RX REV CODE 250: Performed by: HOSPITALIST

## 2023-09-23 RX ORDER — SODIUM CHLORIDE 9 MG/ML
INJECTION, SOLUTION INTRAVENOUS CONTINUOUS
Status: DISCONTINUED | OUTPATIENT
Start: 2023-09-23 | End: 2023-09-23

## 2023-09-23 RX ORDER — PROMETHAZINE HYDROCHLORIDE 25 MG/1
12.5-25 SUPPOSITORY RECTAL EVERY 4 HOURS PRN
Status: DISCONTINUED | OUTPATIENT
Start: 2023-09-23 | End: 2023-09-27 | Stop reason: HOSPADM

## 2023-09-23 RX ORDER — ONDANSETRON 4 MG/1
4 TABLET, ORALLY DISINTEGRATING ORAL EVERY 4 HOURS PRN
Status: DISCONTINUED | OUTPATIENT
Start: 2023-09-23 | End: 2023-09-27 | Stop reason: HOSPADM

## 2023-09-23 RX ORDER — FAMOTIDINE 20 MG/1
20 TABLET, FILM COATED ORAL 2 TIMES DAILY
Status: DISCONTINUED | OUTPATIENT
Start: 2023-09-23 | End: 2023-09-25

## 2023-09-23 RX ORDER — POTASSIUM CHLORIDE 7.45 MG/ML
10 INJECTION INTRAVENOUS
Status: COMPLETED | OUTPATIENT
Start: 2023-09-23 | End: 2023-09-23

## 2023-09-23 RX ORDER — SODIUM CHLORIDE, SODIUM LACTATE, POTASSIUM CHLORIDE, AND CALCIUM CHLORIDE .6; .31; .03; .02 G/100ML; G/100ML; G/100ML; G/100ML
2000 INJECTION, SOLUTION INTRAVENOUS ONCE
Status: COMPLETED | OUTPATIENT
Start: 2023-09-23 | End: 2023-09-23

## 2023-09-23 RX ORDER — ACETAMINOPHEN 325 MG/1
650 TABLET ORAL EVERY 6 HOURS PRN
Status: DISCONTINUED | OUTPATIENT
Start: 2023-09-23 | End: 2023-09-27 | Stop reason: HOSPADM

## 2023-09-23 RX ORDER — POTASSIUM CHLORIDE 7.45 MG/ML
10 INJECTION INTRAVENOUS ONCE
Status: COMPLETED | OUTPATIENT
Start: 2023-09-23 | End: 2023-09-23

## 2023-09-23 RX ORDER — PROCHLORPERAZINE EDISYLATE 5 MG/ML
5-10 INJECTION INTRAMUSCULAR; INTRAVENOUS EVERY 4 HOURS PRN
Status: DISCONTINUED | OUTPATIENT
Start: 2023-09-23 | End: 2023-09-27 | Stop reason: HOSPADM

## 2023-09-23 RX ORDER — MAGNESIUM SULFATE HEPTAHYDRATE 40 MG/ML
2 INJECTION, SOLUTION INTRAVENOUS
Status: COMPLETED | OUTPATIENT
Start: 2023-09-23 | End: 2023-09-23

## 2023-09-23 RX ORDER — HEPARIN SODIUM 5000 [USP'U]/ML
5000 INJECTION, SOLUTION INTRAVENOUS; SUBCUTANEOUS EVERY 8 HOURS
Status: DISCONTINUED | OUTPATIENT
Start: 2023-09-23 | End: 2023-09-26

## 2023-09-23 RX ORDER — ONDANSETRON 2 MG/ML
4 INJECTION INTRAMUSCULAR; INTRAVENOUS EVERY 4 HOURS PRN
Status: DISCONTINUED | OUTPATIENT
Start: 2023-09-23 | End: 2023-09-27 | Stop reason: HOSPADM

## 2023-09-23 RX ORDER — PROMETHAZINE HYDROCHLORIDE 25 MG/1
12.5-25 TABLET ORAL EVERY 4 HOURS PRN
Status: DISCONTINUED | OUTPATIENT
Start: 2023-09-23 | End: 2023-09-27 | Stop reason: HOSPADM

## 2023-09-23 RX ORDER — AMOXICILLIN 250 MG
2 CAPSULE ORAL 2 TIMES DAILY
Status: DISCONTINUED | OUTPATIENT
Start: 2023-09-23 | End: 2023-09-27 | Stop reason: HOSPADM

## 2023-09-23 RX ORDER — DEXTROSE AND SODIUM CHLORIDE 5; .45 G/100ML; G/100ML
INJECTION, SOLUTION INTRAVENOUS CONTINUOUS
Status: DISCONTINUED | OUTPATIENT
Start: 2023-09-23 | End: 2023-09-24

## 2023-09-23 RX ORDER — POLYETHYLENE GLYCOL 3350 17 G/17G
1 POWDER, FOR SOLUTION ORAL
Status: DISCONTINUED | OUTPATIENT
Start: 2023-09-23 | End: 2023-09-27 | Stop reason: HOSPADM

## 2023-09-23 RX ORDER — MAGNESIUM SULFATE HEPTAHYDRATE 40 MG/ML
4 INJECTION, SOLUTION INTRAVENOUS
Status: COMPLETED | OUTPATIENT
Start: 2023-09-23 | End: 2023-09-23

## 2023-09-23 RX ORDER — MAGNESIUM SULFATE HEPTAHYDRATE 40 MG/ML
2 INJECTION, SOLUTION INTRAVENOUS ONCE
Status: COMPLETED | OUTPATIENT
Start: 2023-09-23 | End: 2023-09-23

## 2023-09-23 RX ORDER — DEXTROSE AND SODIUM CHLORIDE 10; .45 G/100ML; G/100ML
INJECTION, SOLUTION INTRAVENOUS CONTINUOUS
Status: DISCONTINUED | OUTPATIENT
Start: 2023-09-23 | End: 2023-09-24

## 2023-09-23 RX ORDER — POTASSIUM CHLORIDE 7.45 MG/ML
10 INJECTION INTRAVENOUS
Status: DISCONTINUED | OUTPATIENT
Start: 2023-09-23 | End: 2023-09-23

## 2023-09-23 RX ORDER — BISACODYL 10 MG
10 SUPPOSITORY, RECTAL RECTAL
Status: DISCONTINUED | OUTPATIENT
Start: 2023-09-23 | End: 2023-09-27 | Stop reason: HOSPADM

## 2023-09-23 RX ORDER — NOREPINEPHRINE BITARTRATE 0.03 MG/ML
0-1 INJECTION, SOLUTION INTRAVENOUS CONTINUOUS
Status: DISCONTINUED | OUTPATIENT
Start: 2023-09-23 | End: 2023-09-24

## 2023-09-23 RX ORDER — POTASSIUM CHLORIDE 7.45 MG/ML
10 INJECTION INTRAVENOUS
Status: COMPLETED | OUTPATIENT
Start: 2023-09-23 | End: 2023-09-24

## 2023-09-23 RX ADMIN — FAMOTIDINE 20 MG: 10 INJECTION, SOLUTION INTRAVENOUS at 17:52

## 2023-09-23 RX ADMIN — POTASSIUM CHLORIDE 10 MEQ: 10 INJECTION, SOLUTION INTRAVENOUS at 13:41

## 2023-09-23 RX ADMIN — MAGNESIUM SULFATE HEPTAHYDRATE 2 G: 2 INJECTION, SOLUTION INTRAVENOUS at 09:13

## 2023-09-23 RX ADMIN — POTASSIUM CHLORIDE 10 MEQ: 10 INJECTION, SOLUTION INTRAVENOUS at 12:32

## 2023-09-23 RX ADMIN — SODIUM CHLORIDE 10 UNITS/HR: 9 INJECTION, SOLUTION INTRAVENOUS at 18:18

## 2023-09-23 RX ADMIN — SODIUM CHLORIDE 4 UNITS/HR: 9 INJECTION, SOLUTION INTRAVENOUS at 08:00

## 2023-09-23 RX ADMIN — HEPARIN SODIUM 5000 UNITS: 5000 INJECTION, SOLUTION INTRAVENOUS; SUBCUTANEOUS at 15:30

## 2023-09-23 RX ADMIN — SODIUM CHLORIDE: 9 INJECTION, SOLUTION INTRAVENOUS at 07:03

## 2023-09-23 RX ADMIN — POTASSIUM CHLORIDE 10 MEQ: 10 INJECTION, SOLUTION INTRAVENOUS at 10:17

## 2023-09-23 RX ADMIN — POTASSIUM PHOSPHATE, MONOBASIC AND POTASSIUM PHOSPHATE, DIBASIC 30 MMOL: 224; 236 INJECTION, SOLUTION, CONCENTRATE INTRAVENOUS at 15:50

## 2023-09-23 RX ADMIN — DEXTROSE AND SODIUM CHLORIDE: 10; .45 INJECTION, SOLUTION INTRAVENOUS at 15:08

## 2023-09-23 RX ADMIN — MAGNESIUM SULFATE HEPTAHYDRATE 2 G: 2 INJECTION, SOLUTION INTRAVENOUS at 15:32

## 2023-09-23 RX ADMIN — FAMOTIDINE 20 MG: 10 INJECTION, SOLUTION INTRAVENOUS at 10:18

## 2023-09-23 RX ADMIN — POTASSIUM CHLORIDE 10 MEQ: 10 INJECTION, SOLUTION INTRAVENOUS at 21:58

## 2023-09-23 RX ADMIN — SODIUM CHLORIDE, POTASSIUM CHLORIDE, SODIUM LACTATE AND CALCIUM CHLORIDE 2000 ML: 600; 310; 30; 20 INJECTION, SOLUTION INTRAVENOUS at 07:12

## 2023-09-23 RX ADMIN — POTASSIUM CHLORIDE 10 MEQ: 10 INJECTION, SOLUTION INTRAVENOUS at 16:56

## 2023-09-23 RX ADMIN — POTASSIUM CHLORIDE 10 MEQ: 10 INJECTION, SOLUTION INTRAVENOUS at 09:06

## 2023-09-23 RX ADMIN — DEXTROSE AND SODIUM CHLORIDE: 5; 450 INJECTION, SOLUTION INTRAVENOUS at 12:17

## 2023-09-23 RX ADMIN — CEFTRIAXONE SODIUM 1000 MG: 1 INJECTION, POWDER, FOR SOLUTION INTRAMUSCULAR; INTRAVENOUS at 09:02

## 2023-09-23 RX ADMIN — HEPARIN SODIUM 5000 UNITS: 5000 INJECTION, SOLUTION INTRAVENOUS; SUBCUTANEOUS at 22:02

## 2023-09-23 RX ADMIN — POTASSIUM CHLORIDE 10 MEQ: 10 INJECTION, SOLUTION INTRAVENOUS at 23:04

## 2023-09-23 ASSESSMENT — LIFESTYLE VARIABLES
ON A TYPICAL DAY WHEN YOU DRINK ALCOHOL HOW MANY DRINKS DO YOU HAVE: 0
EVER HAD A DRINK FIRST THING IN THE MORNING TO STEADY YOUR NERVES TO GET RID OF A HANGOVER: NO
HAVE PEOPLE ANNOYED YOU BY CRITICIZING YOUR DRINKING: NO
TOTAL SCORE: 0
CONSUMPTION TOTAL: NEGATIVE
TOTAL SCORE: 0
EVER FELT BAD OR GUILTY ABOUT YOUR DRINKING: NO
AVERAGE NUMBER OF DAYS PER WEEK YOU HAVE A DRINK CONTAINING ALCOHOL: 0
HAVE YOU EVER FELT YOU SHOULD CUT DOWN ON YOUR DRINKING: NO
ALCOHOL_USE: NO
TOTAL SCORE: 0
HOW MANY TIMES IN THE PAST YEAR HAVE YOU HAD 5 OR MORE DRINKS IN A DAY: 0

## 2023-09-23 ASSESSMENT — COGNITIVE AND FUNCTIONAL STATUS - GENERAL
TURNING FROM BACK TO SIDE WHILE IN FLAT BAD: A LOT
STANDING UP FROM CHAIR USING ARMS: A LOT
EATING MEALS: TOTAL
MOBILITY SCORE: 10
DAILY ACTIVITIY SCORE: 6
SUGGESTED CMS G CODE MODIFIER DAILY ACTIVITY: CN
TOILETING: TOTAL
MOVING FROM LYING ON BACK TO SITTING ON SIDE OF FLAT BED: A LOT
CLIMB 3 TO 5 STEPS WITH RAILING: TOTAL
MOVING TO AND FROM BED TO CHAIR: A LOT
SUGGESTED CMS G CODE MODIFIER MOBILITY: CL
WALKING IN HOSPITAL ROOM: TOTAL
PERSONAL GROOMING: TOTAL
DRESSING REGULAR LOWER BODY CLOTHING: TOTAL
DRESSING REGULAR UPPER BODY CLOTHING: TOTAL
HELP NEEDED FOR BATHING: TOTAL

## 2023-09-23 ASSESSMENT — PATIENT HEALTH QUESTIONNAIRE - PHQ9
SUM OF ALL RESPONSES TO PHQ9 QUESTIONS 1 AND 2: 0
1. LITTLE INTEREST OR PLEASURE IN DOING THINGS: NOT AT ALL
2. FEELING DOWN, DEPRESSED, IRRITABLE, OR HOPELESS: NOT AT ALL

## 2023-09-23 ASSESSMENT — ENCOUNTER SYMPTOMS
HEADACHES: 0
NECK PAIN: 0
BLURRED VISION: 0
GASTROINTESTINAL NEGATIVE: 1
EYES NEGATIVE: 1
PSYCHIATRIC NEGATIVE: 1
MUSCULOSKELETAL NEGATIVE: 1
INSOMNIA: 0
BRUISES/BLEEDS EASILY: 0
COUGH: 0
SHORTNESS OF BREATH: 0
DEPRESSION: 0
WEAKNESS: 1
PALPITATIONS: 0
DOUBLE VISION: 0
CARDIOVASCULAR NEGATIVE: 1
VOMITING: 1
FEVER: 0
NAUSEA: 1
SORE THROAT: 0
WEAKNESS: 0
RESPIRATORY NEGATIVE: 1
MYALGIAS: 0
DIZZINESS: 1

## 2023-09-23 ASSESSMENT — PAIN DESCRIPTION - PAIN TYPE
TYPE: ACUTE PAIN

## 2023-09-23 ASSESSMENT — FIBROSIS 4 INDEX: FIB4 SCORE: 0.84

## 2023-09-23 NOTE — PROGRESS NOTES
~Patient home pharmacy reports that patient has NOT filled the following medications since 05/2023 for a 30 day supply  *Lantus Solostar 35 units daily  *Trulicity 1.5 mg/0.5 ml every week  *Humalog 100 unit/ml Kwikpen 12 units three times a day

## 2023-09-23 NOTE — PROGRESS NOTES
4 Eyes Skin Assessment Completed by CARIE Burroughs and CARIE Garg.    Head Scratch and Redness, Red side facial redness from recent fall.  Ears WDL  Nose WDL  Mouth WDL  Neck WDL  Breast/Chest WDL  Shoulder Blades WDL  Spine WDL  (R) Arm/Elbow/Hand WDL  (L) Arm/Elbow/Hand WDL  Abdomen WDL  Groin Redness and Swelling (mass) on top of left labia. See ADL  Scrotum/Coccyx/Buttocks Redness and Blanching  (R) Leg WDL  (L) Leg WDL  (R) Heel/Foot/Toe Redness and Blanching.   (L) Heel/Foot/Toe Redness and Blanching  Boggy discoloration. See ADL        Devices In Places ECG, Blood Pressure Cuff, Pulse Ox, Vivas, SCD's, and Oxy Mask      Interventions In Place Heel Mepilex, Sacral Mepilex, TAP System, Pillows, Q2 Turns, Low Air Loss Mattress, Barrier Cream, and Heels Loaded W/Pillows    Possible Skin Injury No    Pictures Uploaded Into Epic No, needs to be completed  Wound Consult Placed N/A  RN Wound Prevention Protocol Ordered No

## 2023-09-23 NOTE — H&P
Hospital Medicine History & Physical Note    Date of Service  9/23/2023    Primary Care Physician  Pcp Pt States None    Consultants  None    Code Status  Prior    Chief Complaint  Direct Admission from [facility] for DKA    History of Presenting Illness  Fannie Driver is a 63 y.o. female who is coming as Direct Admission from [facility] for DKA on 9/23/2023.  Patient with history of poorly controlled type 2 diabetes with last hemoglobin A1c on file in July 2020 20-13.7.  Prior admissions with DKA.  She presented at outside facility with initial glucose of 708, anion gap over 30, CO2 of 5 and pH 7.05 on ABG.  She received 2 L of normal saline bolus, initially treated with 10 units of regular insulin and started on insulin infusion.  WBC was also elevated at 21 reason why she was empirically started on Rocephin even though no clear source of infection.  COVID test and viral panel negative.    I discussed the plan of care with patient and  ROGERS Tuttle (outside Facility) .    Review of Systems  Review of Systems   Constitutional:  Positive for malaise/fatigue. Negative for fever.   HENT:  Negative for congestion and sore throat.    Eyes:  Negative for blurred vision and double vision.   Respiratory:  Negative for cough and shortness of breath.    Cardiovascular:  Negative for chest pain and palpitations.   Gastrointestinal:  Positive for nausea and vomiting.   Genitourinary:  Negative for dysuria and urgency.   Musculoskeletal:  Negative for myalgias and neck pain.   Skin:  Negative for itching and rash.   Neurological:  Positive for dizziness. Negative for weakness and headaches.   Endo/Heme/Allergies:  Does not bruise/bleed easily.   Psychiatric/Behavioral:  Negative for depression. The patient does not have insomnia.        Past Medical History   has a past medical history of Diabetes, Other specified symptom associated with female genital organs, and Personal history of venous thrombosis and  embolism.    Surgical History   has a past surgical history that includes other abdominal surgery; cholecystectomy (10/09); ercp in or (10/30/2009); and dental osteotomy (N/A, 2/6/2023).     Family History  Reviewed and not pertinent  Family history reviewed with patient. There is no family history that is pertinent to the chief complaint.     Social History   Denies     Allergies  No Known Allergies    Medications  Cannot display prior to admission medications because the patient has not been admitted in this contact.       Physical Exam                             Physical Exam  Constitutional:       Appearance: Normal appearance.   HENT:      Head: Normocephalic and atraumatic.      Mouth/Throat:      Mouth: Mucous membranes are moist.      Pharynx: Oropharynx is clear.   Eyes:      Extraocular Movements: Extraocular movements intact.      Pupils: Pupils are equal, round, and reactive to light.   Cardiovascular:      Rate and Rhythm: Regular rhythm. Tachycardia present.      Heart sounds: Normal heart sounds.   Pulmonary:      Effort: Pulmonary effort is normal.      Breath sounds: Normal breath sounds.   Abdominal:      General: Abdomen is flat. Bowel sounds are normal.      Palpations: Abdomen is soft.   Musculoskeletal:      Cervical back: Normal range of motion and neck supple.   Skin:     General: Skin is warm and dry.   Neurological:      General: No focal deficit present.      Mental Status: She is alert and oriented to person, place, and time.   Psychiatric:         Mood and Affect: Mood normal.         Behavior: Behavior normal.         Laboratory:        Outside facility pertinent labs as per HPI.  I am adding a stat CBC, BMP and electrolytes now.      EKG:  I have personally reviewed the images and compared with prior images. and My impression is: Sinus tachycardia at 101 bpm. No acute ST elevation (EKG done at Outside facility and I personally reviewed. This is my  interpretation.)    Assessment/Plan:  Justification for Admission Status  I anticipate this patient will require at least two midnights for appropriate medical management, necessitating inpatient admission because 62 yo F, Direct Admission to the ICU  from [facility] with DKA      * DKA, type 2, not at goal (HCC)- (present on admission)  Assessment & Plan  -Patient be admitted to intensive care unit.  -Direct admission from [facility] for DKA  -N.p.o. for now  -Pain and nausea control PRN  -Initial anion gap: >30   -CO2 on Admission: 5  -IVF given in the ED: 2 L normal saline bolus and subsequently started on insulin infusion  -Stat labs were ordered now, as I first assessed this patient at 130 this morning.  -Insulin given in the ED  -Patient is getting started on his insulin gtt    -Starting DKA orders with every 4 hours BMP, potassium replacement and IV fluids according to glucose level  -Patient regular regimen for diabetes includes:    Leukocytosis  Assessment & Plan  Initial WBC 21  Unclear source of infection, COVID negative.  -I was unable to find Chest Ray. Adding U/A, STAT Chest Xray, Procalcitonin, Blood Culture and Lactic Acid  -Will continue Rocephin, initiated at outside facility for now.  -I considered also ordering CT Abdomen, but patient denies any abdominal pain at this time.     MAICOL (acute kidney injury) ([facility])- (present on admission)  Assessment & Plan  -Creatinine at outside facility was 1.7. Baseline is 0.6.  -She received IV fluids. Repeat STAT BMP now      History of DVT (deep vein thrombosis)- (present on admission)  Assessment & Plan  July, 2022. Not currently on anticoagulation.         The patient remains critically ill.  Critical care time =35  minutes in directly providing and coordinating critical care and extensive data review.  No time overlap and excludes procedures (including time reviewing her chart and discussing her with provider at outside facility)    VTE prophylaxis:  SCDs/TEDs

## 2023-09-23 NOTE — PROGRESS NOTES
[facility] DIRECT ADMISSION REPORT  Transferring facility: [facility]  Transferring physician: ROGERS Tuttle    Chief complaint: DKA  Pertinent history & patient course: 63-year-old female, history of poorly controlled type 2 diabetes (Hgb A1C 13.7 on 7/2022) with prior admissions for DKA, with ongoing nausea vomiting for the last 4 days, presented to the hospital on DKA.  Pertinent imaging & lab results: Initial glucose 708, anion gap of 30, CO2 5, acidotic with initial pH of 7.05.  Recent blood pressure 144/79, respiratory rate 32 and heart rate 140/min.  Patient was given 2 L of normal saline bolus, 10 units of regular insulin and now starting insulin infusion.  Her WBC was 21, there is no clear source of infection including COVID and viral panel that were negative.  She started on Rocephin empirically for possible underlying infection.  Consultants called prior to transfer and pertinent input from consultants: No  Code Status: Full Code per transferring provider, I personally verified with the transferring provider patient's code status and the transferring provider has confirmed this with the patient.  Reason for Transfer: DKA needing ICU Admission  Further work up or recommendations requested prior to transfer: No    Patient accepted for transfer: Yes  Accepting [facility] Facility: [facility] to notify the admitting provider when patient arrives to the unit.    *Intensivist at [facility] requested  Hospitalist to discuss case and patient is accepted for DKA here at [facility] ICU.    Consultants to be called upon arrival: No  Admission status: Inpatient.   Floor requested: ICU      The admitting provider is the point of contact for questions or concerns regarding patient's care.

## 2023-09-23 NOTE — ASSESSMENT & PLAN NOTE
HBGA1C is 13.7  DKA protocol  IV fluids  Bicarb on arrival 8  Etiology likely cellulitis, vulva  Maintain on unasyn till cx finalized  Transitioning today to lantus  repeat chem 7 at 4 pm

## 2023-09-23 NOTE — PROGRESS NOTES
Pulmonary Progress Note    Date of admission  9/23/2023    Chief Complaint  63 y.o. female admitted 9/23/2023 with DKA    Hospital Course  62 yo female transferred from [facility] for DKA  HCO3 5,AG > 30 and fglucose 708  COVID and flu negative. She was started on ceftriaxone and insulin and transferred for further management  Hx of DM not compliant, methamphetamine use, hx of DVT not anticoagulated    Interval Problem Update  Reviewed last 24 hour events:  As above    Review of Systems  Review of Systems   Constitutional:  Positive for malaise/fatigue.   HENT: Negative.     Eyes: Negative.    Respiratory: Negative.     Cardiovascular: Negative.    Gastrointestinal: Negative.    Genitourinary: Negative.    Musculoskeletal: Negative.    Skin: Negative.    Neurological:  Positive for weakness.   Endo/Heme/Allergies: Negative.    Psychiatric/Behavioral: Negative.      Pt very weak and not really answering questions well. She says she is fine    Vital Signs for last 24 hours   Temp:  [36.1 °C (96.9 °F)] 36.1 °C (96.9 °F)  Pulse:  [98-99] 98  Resp:  [22-24] 24  BP: (119)/(56) 119/56  SpO2:  [90 %-96 %] 90 %           Physical Exam   Physical Exam  Constitutional:       Appearance: She is ill-appearing.   HENT:      Head: Normocephalic and atraumatic.   Eyes:      Extraocular Movements: Extraocular movements intact.      Pupils: Pupils are equal, round, and reactive to light.   Cardiovascular:      Rate and Rhythm: Normal rate.   Pulmonary:      Effort: Pulmonary effort is normal.      Breath sounds: Normal breath sounds.   Abdominal:      General: Bowel sounds are normal.      Palpations: Abdomen is soft.   Musculoskeletal:      Cervical back: Normal range of motion.   Skin:     General: Skin is warm.      Comments: On the left side of her face she has excoriations  She said she fell   Neurological:      Mental Status: She is alert and oriented to person, place, and time.      Comments: Lethargic and moving all  extremities  Answering yes and no questions appropriately   Psychiatric:      Comments: Flat affect but also lethargic         Medications  Current Facility-Administered Medications   Medication Dose Route Frequency Provider Last Rate Last Admin    D10%-0.45% NaCl infusion   Intravenous Continuous Emerita Barth M.D.   Dose not Required at 09/23/23 0715    MD ALERT-PHARMACY TO CONSULT FOR DKA MONITORING 1 Each  1 Each Other PRN Emerita Barth M.D.        potassium phosphate 30 mmol in  mL ivpb  30 mmol Intravenous Once PRN Emerita Barth M.D.        Or    sodium phosphate 30 mmol in 1/2  mL ivpb  30 mmol Intravenous Once PRN Emerita Barth M.D.        Adult DKA potassium(K+) replacement scale  1 Each Intravenous Q4HRS Emerita Barth M.D.   1 Each at 09/23/23 0715    acetaminophen (Tylenol) tablet 650 mg  650 mg Oral Q6HRS PRN Emerita Barth M.D.        ondansetron (Zofran) syringe/vial injection 4 mg  4 mg Intravenous Q4HRS PRN Emerita Barth M.D.        ondansetron (Zofran ODT) dispertab 4 mg  4 mg Oral Q4HRS PRN Emerita Barth M.D.        promethazine (Phenergan) tablet 12.5-25 mg  12.5-25 mg Oral Q4HRS PRN Emerita Barth M.D.        promethazine (Phenergan) suppository 12.5-25 mg  12.5-25 mg Rectal Q4HRS PRN Emerita Barth M.D.        prochlorperazine (Compazine) injection 5-10 mg  5-10 mg Intravenous Q4HRS PRN Emerita Barth M.D.        senna-docusate (Pericolace Or Senokot S) 8.6-50 MG per tablet 2 Tablet  2 Tablet Oral BID Emerita Barth M.D.        And    polyethylene glycol/lytes (Miralax) PACKET 1 Packet  1 Packet Oral QDAY PRN Emerita Barth M.D.        And    magnesium hydroxide (Milk Of Magnesia) suspension 30 mL  30 mL Oral QDAY PRN Emerita Barth M.D.        And    bisacodyl (Dulcolax) suppository 10 mg  10 mg Rectal QDAY PRN Emerita Barth M.D.        NS  infusion   Intravenous Continuous Emerita Barth M.D. 125 mL/hr at 09/23/23 0703 New Bag at 09/23/23 0703    D5 1/2 NS infusion   Intravenous Continuous Emerita Barth M.D.   Dose not Required at 09/23/23 0715    insulin regular (Humulin R) 100 Units in  mL Infusion for DKA  4 Units/hr Intravenous Continuous Viet Toro M.D. 8 mL/hr at 09/23/23 1018 8 Units/hr at 09/23/23 1018    potassium chloride (Kcl) ivpb 10 mEq  10 mEq Intravenous Q HOUR Viet Toro M.D. 100 mL/hr at 09/23/23 1017 10 mEq at 09/23/23 1017    heparin injection 5,000 Units  5,000 Units Subcutaneous Q8HRS Viet Toro M.D.        famotidine (Pepcid) tablet 20 mg  20 mg Oral BID Viet Toro M.D.        Or    famotidine (Pepcid) injection 20 mg  20 mg Intravenous BID Viet Toro M.D.   20 mg at 09/23/23 1018    [START ON 9/24/2023] ampicillin/sulbactam (Unasyn) 3 g in  mL IVPB  3 g Intravenous Q6HRS Viet Toro M.D.           Fluids    Intake/Output Summary (Last 24 hours) at 9/23/2023 1116  Last data filed at 9/23/2023 0800  Gross per 24 hour   Intake 0 ml   Output --   Net 0 ml       Laboratory          Recent Labs     09/23/23  0720   SODIUM 135   POTASSIUM 4.1   CHLORIDE 98   CO2 8*   BUN 35*   CREATININE 1.29   MAGNESIUM 1.5   PHOSPHORUS 2.6   CALCIUM 10.3*     Recent Labs     09/23/23  0720   ALTSGPT 10   ASTSGOT 10*   ALKPHOSPHAT 163*   TBILIRUBIN 0.2   GLUCOSE 348*     Recent Labs     09/23/23  0720   WBC 21.0*   NEUTSPOLYS 68.00   LYMPHOCYTES 9.00*   MONOCYTES 10.00   EOSINOPHILS 0.00   BASOPHILS 0.00   ASTSGOT 10*   ALTSGPT 10   ALKPHOSPHAT 163*   TBILIRUBIN 0.2     Recent Labs     09/23/23  0720   RBC 4.90   HEMOGLOBIN 14.7   HEMATOCRIT 42.5   PLATELETCT 379       Imaging  X-Ray:  no I or E    Assessment/Plan  * DKA, type 2, not at goal (HCC)- (present on admission)  Assessment & Plan  HBGA1C is 13.7  DKA protocol  IV fluids  Bicarb on arrival  8  Etiology likely cellulitis    Vulval cellulitis  Assessment & Plan  On the left side  Unasyn  Follow up blood cxs done in [place]    Leukocytosis- (present on admission)  Assessment & Plan  Likely from cellulitis    MAICOL (acute kidney injury) (HCC)- (present on admission)  Assessment & Plan  Assume from dehydration  IV fluids  Avoid  Nephrotoxic drugs  monitor         VTE:  Heparin  Ulcer: Not Indicated  Lines: None    I have performed a physical exam and reviewed and updated ROS and Plan today (9/23/2023). In review of yesterday's note (9/22/2023), there are no changes except as documented above.     Discussed patient condition and risk of morbidity and/or mortality with Pharmacy and Charge nurse / hot rounds  The patient remains critically ill.  Critical care time = 33 minutes in directly providing and coordinating critical care and extensive data review.  No time overlap and excludes procedures.

## 2023-09-23 NOTE — ASSESSMENT & PLAN NOTE
Diabetes is uncontrolled with hyperglycemia diabetes is uncontrolled hyperglycemia, her hemoglobin A1c is 13.7, blood sugars over the last 24 hours have ranged from 190s to 290s  Increase Lantus  Continue nutritional and sliding scale insulin

## 2023-09-24 LAB
ANION GAP SERPL CALC-SCNC: 11 MMOL/L (ref 7–16)
ANION GAP SERPL CALC-SCNC: 11 MMOL/L (ref 7–16)
ANION GAP SERPL CALC-SCNC: 12 MMOL/L (ref 7–16)
ANION GAP SERPL CALC-SCNC: 13 MMOL/L (ref 7–16)
B-OH-BUTYR SERPL-MCNC: <0.2 MMOL/L (ref 0.02–0.27)
BUN SERPL-MCNC: 14 MG/DL (ref 8–22)
BUN SERPL-MCNC: 16 MG/DL (ref 8–22)
BUN SERPL-MCNC: 19 MG/DL (ref 8–22)
BUN SERPL-MCNC: 21 MG/DL (ref 8–22)
CALCIUM SERPL-MCNC: 9 MG/DL (ref 8.4–10.2)
CALCIUM SERPL-MCNC: 9.1 MG/DL (ref 8.4–10.2)
CHLORIDE SERPL-SCNC: 106 MMOL/L (ref 96–112)
CHLORIDE SERPL-SCNC: 108 MMOL/L (ref 96–112)
CHLORIDE SERPL-SCNC: 109 MMOL/L (ref 96–112)
CHLORIDE SERPL-SCNC: 110 MMOL/L (ref 96–112)
CO2 SERPL-SCNC: 13 MMOL/L (ref 20–33)
CO2 SERPL-SCNC: 15 MMOL/L (ref 20–33)
CO2 SERPL-SCNC: 15 MMOL/L (ref 20–33)
CO2 SERPL-SCNC: 16 MMOL/L (ref 20–33)
CREAT SERPL-MCNC: 0.64 MG/DL (ref 0.5–1.4)
CREAT SERPL-MCNC: 0.72 MG/DL (ref 0.5–1.4)
CREAT SERPL-MCNC: 0.75 MG/DL (ref 0.5–1.4)
CREAT SERPL-MCNC: 0.77 MG/DL (ref 0.5–1.4)
ERYTHROCYTE [DISTWIDTH] IN BLOOD BY AUTOMATED COUNT: 41.3 FL (ref 35.9–50)
GFR SERPLBLD CREATININE-BSD FMLA CKD-EPI: 100 ML/MIN/1.73 M 2
GFR SERPLBLD CREATININE-BSD FMLA CKD-EPI: 87 ML/MIN/1.73 M 2
GFR SERPLBLD CREATININE-BSD FMLA CKD-EPI: 90 ML/MIN/1.73 M 2
GFR SERPLBLD CREATININE-BSD FMLA CKD-EPI: 94 ML/MIN/1.73 M 2
GLUCOSE BLD STRIP.AUTO-MCNC: 109 MG/DL (ref 65–99)
GLUCOSE BLD STRIP.AUTO-MCNC: 113 MG/DL (ref 65–99)
GLUCOSE BLD STRIP.AUTO-MCNC: 124 MG/DL (ref 65–99)
GLUCOSE BLD STRIP.AUTO-MCNC: 125 MG/DL (ref 65–99)
GLUCOSE BLD STRIP.AUTO-MCNC: 126 MG/DL (ref 65–99)
GLUCOSE BLD STRIP.AUTO-MCNC: 127 MG/DL (ref 65–99)
GLUCOSE BLD STRIP.AUTO-MCNC: 151 MG/DL (ref 65–99)
GLUCOSE BLD STRIP.AUTO-MCNC: 151 MG/DL (ref 65–99)
GLUCOSE BLD STRIP.AUTO-MCNC: 154 MG/DL (ref 65–99)
GLUCOSE BLD STRIP.AUTO-MCNC: 158 MG/DL (ref 65–99)
GLUCOSE BLD STRIP.AUTO-MCNC: 166 MG/DL (ref 65–99)
GLUCOSE BLD STRIP.AUTO-MCNC: 201 MG/DL (ref 65–99)
GLUCOSE BLD STRIP.AUTO-MCNC: 219 MG/DL (ref 65–99)
GLUCOSE BLD STRIP.AUTO-MCNC: 98 MG/DL (ref 65–99)
GLUCOSE SERPL-MCNC: 138 MG/DL (ref 65–99)
GLUCOSE SERPL-MCNC: 151 MG/DL (ref 65–99)
GLUCOSE SERPL-MCNC: 176 MG/DL (ref 65–99)
GLUCOSE SERPL-MCNC: 220 MG/DL (ref 65–99)
HCT VFR BLD AUTO: 35.9 % (ref 37–47)
HGB BLD-MCNC: 12.6 G/DL (ref 12–16)
MAGNESIUM SERPL-MCNC: 1.7 MG/DL (ref 1.5–2.5)
MCH RBC QN AUTO: 30.1 PG (ref 27–33)
MCHC RBC AUTO-ENTMCNC: 35.1 G/DL (ref 32.2–35.5)
MCV RBC AUTO: 85.9 FL (ref 81.4–97.8)
PHOSPHATE SERPL-MCNC: 1.1 MG/DL (ref 2.5–4.5)
PLATELET # BLD AUTO: 278 K/UL (ref 164–446)
PMV BLD AUTO: 10.2 FL (ref 9–12.9)
POTASSIUM SERPL-SCNC: 3.4 MMOL/L (ref 3.6–5.5)
POTASSIUM SERPL-SCNC: 3.7 MMOL/L (ref 3.6–5.5)
POTASSIUM SERPL-SCNC: 3.8 MMOL/L (ref 3.6–5.5)
POTASSIUM SERPL-SCNC: 3.8 MMOL/L (ref 3.6–5.5)
RBC # BLD AUTO: 4.18 M/UL (ref 4.2–5.4)
SODIUM SERPL-SCNC: 134 MMOL/L (ref 135–145)
SODIUM SERPL-SCNC: 134 MMOL/L (ref 135–145)
SODIUM SERPL-SCNC: 135 MMOL/L (ref 135–145)
SODIUM SERPL-SCNC: 136 MMOL/L (ref 135–145)
WBC # BLD AUTO: 12.6 K/UL (ref 4.8–10.8)

## 2023-09-24 PROCEDURE — 84100 ASSAY OF PHOSPHORUS: CPT

## 2023-09-24 PROCEDURE — 82010 KETONE BODYS QUAN: CPT

## 2023-09-24 PROCEDURE — 700105 HCHG RX REV CODE 258: Performed by: INTERNAL MEDICINE

## 2023-09-24 PROCEDURE — 700111 HCHG RX REV CODE 636 W/ 250 OVERRIDE (IP): Mod: JZ | Performed by: INTERNAL MEDICINE

## 2023-09-24 PROCEDURE — 99291 CRITICAL CARE FIRST HOUR: CPT | Performed by: INTERNAL MEDICINE

## 2023-09-24 PROCEDURE — 700101 HCHG RX REV CODE 250: Performed by: INTERNAL MEDICINE

## 2023-09-24 PROCEDURE — 83735 ASSAY OF MAGNESIUM: CPT

## 2023-09-24 PROCEDURE — 700111 HCHG RX REV CODE 636 W/ 250 OVERRIDE (IP): Mod: JZ | Performed by: HOSPITALIST

## 2023-09-24 PROCEDURE — 770001 HCHG ROOM/CARE - MED/SURG/GYN PRIV*

## 2023-09-24 PROCEDURE — 36415 COLL VENOUS BLD VENIPUNCTURE: CPT

## 2023-09-24 PROCEDURE — 700102 HCHG RX REV CODE 250 W/ 637 OVERRIDE(OP): Performed by: INTERNAL MEDICINE

## 2023-09-24 PROCEDURE — A9270 NON-COVERED ITEM OR SERVICE: HCPCS | Performed by: HOSPITALIST

## 2023-09-24 PROCEDURE — 82962 GLUCOSE BLOOD TEST: CPT | Mod: 91

## 2023-09-24 PROCEDURE — 700102 HCHG RX REV CODE 250 W/ 637 OVERRIDE(OP): Performed by: HOSPITALIST

## 2023-09-24 PROCEDURE — 80048 BASIC METABOLIC PNL TOTAL CA: CPT | Mod: 91

## 2023-09-24 PROCEDURE — A9270 NON-COVERED ITEM OR SERVICE: HCPCS | Performed by: INTERNAL MEDICINE

## 2023-09-24 PROCEDURE — 94760 N-INVAS EAR/PLS OXIMETRY 1: CPT

## 2023-09-24 PROCEDURE — 85027 COMPLETE CBC AUTOMATED: CPT

## 2023-09-24 PROCEDURE — 700105 HCHG RX REV CODE 258: Performed by: HOSPITALIST

## 2023-09-24 RX ORDER — DEXTROSE MONOHYDRATE 25 G/50ML
25 INJECTION, SOLUTION INTRAVENOUS
Status: DISCONTINUED | OUTPATIENT
Start: 2023-09-24 | End: 2023-09-27 | Stop reason: HOSPADM

## 2023-09-24 RX ORDER — MAGNESIUM SULFATE HEPTAHYDRATE 40 MG/ML
2 INJECTION, SOLUTION INTRAVENOUS ONCE
Status: COMPLETED | OUTPATIENT
Start: 2023-09-24 | End: 2023-09-24

## 2023-09-24 RX ORDER — POTASSIUM CHLORIDE 7.45 MG/ML
10 INJECTION INTRAVENOUS
Status: COMPLETED | OUTPATIENT
Start: 2023-09-24 | End: 2023-09-24

## 2023-09-24 RX ADMIN — POTASSIUM CHLORIDE 10 MEQ: 10 INJECTION, SOLUTION INTRAVENOUS at 04:04

## 2023-09-24 RX ADMIN — AMPICILLIN AND SULBACTAM 3 G: 1; 2 INJECTION, POWDER, FOR SOLUTION INTRAMUSCULAR; INTRAVENOUS at 11:58

## 2023-09-24 RX ADMIN — INSULIN GLARGINE-YFGN 20 UNITS: 100 INJECTION, SOLUTION SUBCUTANEOUS at 10:04

## 2023-09-24 RX ADMIN — INSULIN HUMAN 3 UNITS: 100 INJECTION, SOLUTION PARENTERAL at 17:02

## 2023-09-24 RX ADMIN — HEPARIN SODIUM 5000 UNITS: 5000 INJECTION, SOLUTION INTRAVENOUS; SUBCUTANEOUS at 21:11

## 2023-09-24 RX ADMIN — INSULIN GLARGINE-YFGN 20 UNITS: 100 INJECTION, SOLUTION SUBCUTANEOUS at 18:35

## 2023-09-24 RX ADMIN — DEXTROSE AND SODIUM CHLORIDE: 10; .45 INJECTION, SOLUTION INTRAVENOUS at 08:56

## 2023-09-24 RX ADMIN — POTASSIUM PHOSPHATE, MONOBASIC AND POTASSIUM PHOSPHATE, DIBASIC 30 MMOL: 224; 236 INJECTION, SOLUTION, CONCENTRATE INTRAVENOUS at 08:56

## 2023-09-24 RX ADMIN — SENNOSIDES AND DOCUSATE SODIUM 2 TABLET: 50; 8.6 TABLET ORAL at 18:35

## 2023-09-24 RX ADMIN — AMPICILLIN AND SULBACTAM 3 G: 1; 2 INJECTION, POWDER, FOR SOLUTION INTRAMUSCULAR; INTRAVENOUS at 00:52

## 2023-09-24 RX ADMIN — MAGNESIUM SULFATE HEPTAHYDRATE 2 G: 2 INJECTION, SOLUTION INTRAVENOUS at 08:16

## 2023-09-24 RX ADMIN — INSULIN HUMAN 3 UNITS: 100 INJECTION, SOLUTION PARENTERAL at 21:12

## 2023-09-24 RX ADMIN — AMPICILLIN AND SULBACTAM 3 G: 1; 2 INJECTION, POWDER, FOR SOLUTION INTRAMUSCULAR; INTRAVENOUS at 06:27

## 2023-09-24 RX ADMIN — AMPICILLIN AND SULBACTAM 3 G: 1; 2 INJECTION, POWDER, FOR SOLUTION INTRAMUSCULAR; INTRAVENOUS at 19:17

## 2023-09-24 RX ADMIN — HEPARIN SODIUM 5000 UNITS: 5000 INJECTION, SOLUTION INTRAVENOUS; SUBCUTANEOUS at 14:56

## 2023-09-24 RX ADMIN — DEXTROSE AND SODIUM CHLORIDE: 10; .45 INJECTION, SOLUTION INTRAVENOUS at 00:09

## 2023-09-24 RX ADMIN — FAMOTIDINE 20 MG: 10 INJECTION, SOLUTION INTRAVENOUS at 06:27

## 2023-09-24 RX ADMIN — HEPARIN SODIUM 5000 UNITS: 5000 INJECTION, SOLUTION INTRAVENOUS; SUBCUTANEOUS at 06:27

## 2023-09-24 RX ADMIN — AMPICILLIN AND SULBACTAM 3 G: 1; 2 INJECTION, POWDER, FOR SOLUTION INTRAMUSCULAR; INTRAVENOUS at 23:15

## 2023-09-24 RX ADMIN — FAMOTIDINE 20 MG: 20 TABLET ORAL at 18:35

## 2023-09-24 RX ADMIN — POTASSIUM CHLORIDE 10 MEQ: 10 INJECTION, SOLUTION INTRAVENOUS at 05:14

## 2023-09-24 RX ADMIN — ACETAMINOPHEN 650 MG: 325 TABLET ORAL at 12:04

## 2023-09-24 RX ADMIN — SODIUM CHLORIDE 7 UNITS/HR: 9 INJECTION, SOLUTION INTRAVENOUS at 08:59

## 2023-09-24 ASSESSMENT — PAIN DESCRIPTION - PAIN TYPE
TYPE: ACUTE PAIN

## 2023-09-24 ASSESSMENT — ENCOUNTER SYMPTOMS
CARDIOVASCULAR NEGATIVE: 1
EYES NEGATIVE: 1
MUSCULOSKELETAL NEGATIVE: 1
RESPIRATORY NEGATIVE: 1
GASTROINTESTINAL NEGATIVE: 1
PSYCHIATRIC NEGATIVE: 1
WEAKNESS: 1

## 2023-09-24 ASSESSMENT — COGNITIVE AND FUNCTIONAL STATUS - GENERAL
TURNING FROM BACK TO SIDE WHILE IN FLAT BAD: A LITTLE
STANDING UP FROM CHAIR USING ARMS: A LOT
DRESSING REGULAR UPPER BODY CLOTHING: A LOT
SUGGESTED CMS G CODE MODIFIER MOBILITY: CL
DRESSING REGULAR LOWER BODY CLOTHING: A LOT
MOVING FROM LYING ON BACK TO SITTING ON SIDE OF FLAT BED: A LOT
MOBILITY SCORE: 13
MOVING TO AND FROM BED TO CHAIR: A LITTLE
EATING MEALS: A LITTLE
HELP NEEDED FOR BATHING: A LOT
CLIMB 3 TO 5 STEPS WITH RAILING: TOTAL
SUGGESTED CMS G CODE MODIFIER DAILY ACTIVITY: CL
DAILY ACTIVITIY SCORE: 13
PERSONAL GROOMING: A LOT
TOILETING: A LOT
WALKING IN HOSPITAL ROOM: A LOT

## 2023-09-24 ASSESSMENT — FIBROSIS 4 INDEX: FIB4 SCORE: 0.71

## 2023-09-24 NOTE — CARE PLAN
The patient is Watcher - Medium risk of patient condition declining or worsening    Shift Goals  Clinical Goals: DKA protocol  Patient Goals: IVA    Progress made toward(s) clinical / shift goals:  Pt's DKA labs with improvement, possible transition this evening.     Patient is not progressing towards the following goals:      Problem: Knowledge Deficit - Standard  Goal: Patient and family/care givers will demonstrate understanding of plan of care, disease process/condition, diagnostic tests and medications  Outcome: Not Progressing     Problem: Urinary Elimination  Goal: Establish and maintain regular urinary output  Outcome: Not Progressing  Note: Pt with urinary retention requiring palomares.

## 2023-09-24 NOTE — CARE PLAN
The patient is Stable - Low risk of patient condition declining or worsening    Shift Goals  Clinical Goals: DKA protocol  Patient Goals: Rest, comfort  Family Goals: IVA    Progress made toward(s) clinical / shift goals:    Problem: Knowledge Deficit - Standard  Goal: Patient and family/care givers will demonstrate understanding of plan of care, disease process/condition, diagnostic tests and medications  Outcome: Progressing  Note: Off DKA protocol, on antibiotics for cellulitis.        Patient is not progressing towards the following goals:      Problem: Respiratory  Goal: Patient will achieve/maintain optimum respiratory ventilation and gas exchange  Outcome: Not Progressing  Note: Requiring Oxygen, pt encouraged to deep breathe and cough.

## 2023-09-24 NOTE — CARE PLAN
The patient is Watcher - Medium risk of patient condition declining or worsening    Shift Goals  Clinical Goals: DKA protocol  Patient Goals: IVA  Family Goals: IVA    Progress made toward(s) clinical / shift goals:    Problem: Skin Integrity  Goal: Skin integrity is maintained or improved  Outcome: Progressing     Problem: Fall Risk  Goal: Patient will remain free from falls  Outcome: Progressing     Problem: Hemodynamics  Goal: Patient's hemodynamics, fluid balance and neurologic status will be stable or improve  Outcome: Progressing     Problem: Respiratory  Goal: Patient will achieve/maintain optimum respiratory ventilation and gas exchange  Outcome: Progressing       Patient is not progressing towards the following goals:      Problem: Knowledge Deficit - Standard  Goal: Patient and family/care givers will demonstrate understanding of plan of care, disease process/condition, diagnostic tests and medications  Outcome: Not Progressing     Problem: Psychosocial  Goal: Patient's level of anxiety will decrease  Outcome: Not Progressing  Goal: Patient's ability to verbalize feelings about condition will improve  Outcome: Not Progressing     Problem: Bowel Elimination  Goal: Establish and maintain regular bowel function  Outcome: Not Progressing

## 2023-09-24 NOTE — CARE PLAN
The patient is Watcher - Medium risk of patient condition declining or worsening    Shift Goals Admin of ABX for cellulitis, encourage nutritional intake  Clinical Goals: Q 2 turns for skin intergity, abx for cellulitis to labia  Patient Goals: sleep  Family Goals: no family present    Progress made toward(s) clinical / shift goals:      Patient is not progressing towards the following goals:      Problem: Knowledge Deficit - Standard  Goal: Patient and family/care givers will demonstrate understanding of plan of care, disease process/condition, diagnostic tests and medications  Outcome: Not Progressing     Problem: Psychosocial  Goal: Patient's level of anxiety will decrease  Outcome: Not Progressing  Goal: Patient's ability to verbalize feelings about condition will improve  Outcome: Not Progressing  Goal: Patient's ability to re-evaluate and adapt role responsibilities will improve  Outcome: Not Progressing  Goal: Patient and family will demonstrate ability to cope with life altering diagnosis and/or procedure  Outcome: Not Progressing  Goal: Spiritual and cultural needs incorporated into hospitalization  Outcome: Not Progressing     Problem: Respiratory  Goal: Patient will achieve/maintain optimum respiratory ventilation and gas exchange  Outcome: Not Progressing     Problem: Urinary - Renal Perfusion  Goal: Ability to achieve and maintain adequate renal perfusion and functioning will improve  Outcome: Not Progressing     Problem: Nutrition  Goal: Patient's nutritional and fluid intake will be adequate or improve  Outcome: Not Progressing  Goal: Enteral nutrition will be maintained or improve  Outcome: Not Progressing  Goal: Enteral nutrition will be maintained or improve  Outcome: Not Progressing

## 2023-09-24 NOTE — PROGRESS NOTES
12-hour chart check complete.    Monitor Summary  Rhythm: SR  Rate: 80's-90's  Ectopy: Rare PVC's  Measurements: .16/.08/.36    *As measured by MT

## 2023-09-24 NOTE — PROGRESS NOTES
Pulmonary Progress Note    Date of admission  9/23/2023    Chief Complaint  63 y.o. female admitted 9/23/2023 with DKA     Hospital Course  64 yo female transferred from [facility] for DKA  HCO3 5,AG > 30 and fglucose 708  COVID and flu negative. She was started on ceftriaxone and insulin and transferred for further management  Hx of DM not compliant, methamphetamine use, hx of DVT not anticoagulated    9/23 vulva cellulitis noted changed to unasyn. [place] blood cxs 1/4 strep  9/24: gap closed    Interval Problem Update  Reviewed last 24 hour events:  Gap closed   Bicarb 16  Cxs n+ [facility] strep 1/4, here negative    Review of Systems  Review of Systems   Constitutional:  Positive for malaise/fatigue.   HENT: Negative.     Eyes: Negative.    Respiratory: Negative.     Cardiovascular: Negative.    Gastrointestinal: Negative.    Genitourinary: Negative.    Musculoskeletal: Negative.    Skin: Negative.    Neurological:  Positive for weakness.   Endo/Heme/Allergies: Negative.    Psychiatric/Behavioral: Negative.          Vital Signs for last 24 hours   Temp:  [36.2 °C (97.1 °F)-37.8 °C (100 °F)] 37.3 °C (99.1 °F)  Pulse:  [] 87  Resp:  [12-25] 14  BP: ()/(41-62) 124/57  SpO2:  [83 %-100 %] 99 %       Physical Exam   Physical Exam  Constitutional:       Appearance: She is ill-appearing.   HENT:      Head: Normocephalic and atraumatic.      Mouth/Throat:      Mouth: Mucous membranes are dry.   Eyes:      Extraocular Movements: Extraocular movements intact.      Pupils: Pupils are equal, round, and reactive to light.   Cardiovascular:      Rate and Rhythm: Normal rate.   Pulmonary:      Effort: Pulmonary effort is normal.      Breath sounds: Normal breath sounds.   Abdominal:      General: Abdomen is flat. Bowel sounds are normal.      Palpations: Abdomen is soft.   Genitourinary:     Comments: Left vulva erythema not worse  Musculoskeletal:      Cervical back: Normal range of motion.   Skin:      Comments: Excoriation on the dace left side   Neurological:      General: No focal deficit present.      Mental Status: She is alert and oriented to person, place, and time.   Psychiatric:         Mood and Affect: Mood normal.         Behavior: Behavior normal.         Thought Content: Thought content normal.         Judgment: Judgment normal.         Medications  Current Facility-Administered Medications   Medication Dose Route Frequency Provider Last Rate Last Admin    potassium phosphate 30 mmol in  mL ivpb  30 mmol Intravenous Once Viet Toro M.D. 83.3 mL/hr at 09/24/23 0856 30 mmol at 09/24/23 0856    insulin GLARGINE (Lantus,Semglee) injection  20 Units Subcutaneous BID Viet Toro M.D.   20 Units at 09/24/23 1004    insulin regular (HumuLIN R,NovoLIN R) injection  2-9 Units Subcutaneous 4X/DAY ACHNICOLE Toro M.D.        And    dextrose 50% (D50W) injection 25 g  25 g Intravenous Q15 MIN PRN Viet Toro M.D.        acetaminophen (Tylenol) tablet 650 mg  650 mg Oral Q6HRS PRN Emerita Barth M.D.        ondansetron (Zofran) syringe/vial injection 4 mg  4 mg Intravenous Q4HRS PRN Emerita Barth M.D.        ondansetron (Zofran ODT) dispertab 4 mg  4 mg Oral Q4HRS PRGEETA Barth M.D.        promethazine (Phenergan) tablet 12.5-25 mg  12.5-25 mg Oral Q4HRS DARREN Barth M.D.        promethazine (Phenergan) suppository 12.5-25 mg  12.5-25 mg Rectal Q4HRS PRGEETA Barth M.D.        prochlorperazine (Compazine) injection 5-10 mg  5-10 mg Intravenous Q4HRS PRGEETA Barth M.D.        senna-docusate (Pericolace Or Senokot S) 8.6-50 MG per tablet 2 Tablet  2 Tablet Oral BID Emerita Barth M.D.        And    polyethylene glycol/lytes (Miralax) PACKET 1 Packet  1 Packet Oral QDAY PRN Emerita Barth M.D.        And    magnesium hydroxide (Milk Of Magnesia) suspension 30 mL  30 mL Oral QDAY  PRN Emerita Barth M.D.        And    bisacodyl (Dulcolax) suppository 10 mg  10 mg Rectal QDAY PRN Emerita Barth M.D.        heparin injection 5,000 Units  5,000 Units Subcutaneous Q8HRS Viet Toro M.D.   5,000 Units at 09/24/23 0627    famotidine (Pepcid) tablet 20 mg  20 mg Oral BID Viet Toro M.D.        Or    famotidine (Pepcid) injection 20 mg  20 mg Intravenous BID Viet Toro M.D.   20 mg at 09/24/23 0627    ampicillin/sulbactam (Unasyn) 3 g in  mL IVPB  3 g Intravenous Q6HRS Viet Toro M.D.   Stopped at 09/24/23 0657       Fluids    Intake/Output Summary (Last 24 hours) at 9/24/2023 1118  Last data filed at 9/24/2023 1000  Gross per 24 hour   Intake 4070.91 ml   Output 1575 ml   Net 2495.91 ml       Laboratory          Recent Labs     09/23/23  1126 09/23/23  1524 09/23/23  1957 09/24/23  0105 09/24/23  0400 09/24/23  0708   SODIUM 137   < > 136 134* 135 136   POTASSIUM 3.5*   < > 3.9 3.7 3.4* 3.8   CHLORIDE 105   < > 108 108 110 109   CO2 10*   < > 15* 15* 13* 16*   BUN 32*   < > 25* 21 19 16   CREATININE 1.12   < > 0.86 0.75 0.77 0.72   MAGNESIUM 1.7  --  1.9  --   --  1.7   PHOSPHORUS 1.6*  --  2.4*  --   --  1.1*   CALCIUM 9.5   < > 9.0 9.0 9.0 9.1    < > = values in this interval not displayed.     Recent Labs     09/23/23  0720 09/23/23  1126 09/24/23  0105 09/24/23  0400 09/24/23  0708   ALTSGPT 10  --   --   --   --    ASTSGOT 10*  --   --   --   --    ALKPHOSPHAT 163*  --   --   --   --    TBILIRUBIN 0.2  --   --   --   --    GLUCOSE 348*   < > 176* 151* 138*    < > = values in this interval not displayed.     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)- (present on admission)  Assessment & Plan  HBGA1C is 13.7  DKA protocol  IV fluids  Bicarb on arrival 8  Etiology likely cellulitis, vulva  Maintain on unasyn till cx finalized  Transitioning today to lantus  repeat chem 7 at 4 pm    Vulval cellulitis  Assessment & Plan  On the left side  Unasyn  Follow up blood cxs done in [place] as 1/4 positive strep    Leukocytosis- (present on admission)  Assessment & Plan  Likely from cellulitis will repeat    History of DVT (deep vein thrombosis)- (present on admission)  Assessment & Plan  Known hx noncompliant so not on anticoagulation    MAICOL (acute kidney injury) (HCC)- (present on admission)  Assessment & Plan  Assume from dehydration  IV fluids  Avoid  Nephrotoxic drugs  resolved         VTE:  Heparin  Ulcer: Not Indicated  Lines: palomares -- needed as retaining urine    I have performed a physical exam and reviewed and updated ROS and Plan today (9/24/2023). In review of yesterday's note (9/23/2023), there are no changes except as documented above.     Discussed patient condition and risk of morbidity and/or mortality with Pharmacy and Charge nurse / hot rounds  The patient remains critically ill.  Critical care time = 33 minutes in directly providing and coordinating critical care and extensive data review.  No time overlap and excludes procedures.

## 2023-09-24 NOTE — PROGRESS NOTES
4 Eyes Skin Assessment Completed by Marylin RN and Vikash RN.    Head Scab to left cheek, HEVER from GLF PTA  Ears WDL  Nose WDL  Mouth WDL, dry lips,   Neck WDL  Breast/Chest WDLbirth rob under left breast  Shoulder Blades WDL  Spine WDL  (R) Arm/Elbow/Hand WDL  (L) Arm/Elbow/Hand  very small scab to forearm, HEVER  Abdomen WDL  Groin Redness  Scrotum/Coccyx/Buttocks Blanching  (R) Leg Scab to forefoot, dorsal, HEVER  (L) Leg WDL  (R) Heel/Foot/Toe Blanching, meplix on  (L) Heel/Foot/Toe blanching meplix on          Devices In Places Pulse Ox, NC tubing, SCDs      Interventions In Place Heel Mepilex, Q 2 turns, heels floated, foam to nc tubing ears    Possible Skin Injury No    Pictures Uploaded Into Epic Yes  Wound Consult Placed N/A  RN Wound Prevention Protocol Ordered Yes

## 2023-09-24 NOTE — PROGRESS NOTES
"Pt has arrived to unit, calm, cooperative,tearful, sleepy watching TV, upright in bed, having lunch. RN reviewed POC which is for continued abx tx, monitor groin for edema (cellulitis).2 RN skin check done. Pt has palomares for retention, clear urine. CNA has completed initial VS. RN and CNA will collaborate for Q 2 turns.Pt has meplix to both heels, heels floated on pillows, foam to tubing on NC O2. Fall and safety precautions in place, pt uses call light appropriately, bed alarm and strip alarm confirmed as on and activated. RN encouraged CDB w/ \"I.S.\" will  reinforce t/o day. Pt has Heparin for VTE, SCD son Hourly rounds ongoing, call light w/in reach.   "

## 2023-09-24 NOTE — PROGRESS NOTES
12-hour chart check complete.    Monitor Summary  Rhythm: SR  Rate: 86-99  Ectopy: r-oPAC; o-rPVC; rTrig  Measurements: 0.16/0.08/0.32

## 2023-09-25 LAB
ANION GAP SERPL CALC-SCNC: 12 MMOL/L (ref 7–16)
BASOPHILS # BLD AUTO: 0.7 % (ref 0–1.8)
BASOPHILS # BLD: 0.07 K/UL (ref 0–0.12)
BUN SERPL-MCNC: 13 MG/DL (ref 8–22)
CALCIUM SERPL-MCNC: 9.2 MG/DL (ref 8.4–10.2)
CHLORIDE SERPL-SCNC: 108 MMOL/L (ref 96–112)
CO2 SERPL-SCNC: 16 MMOL/L (ref 20–33)
CREAT SERPL-MCNC: 0.58 MG/DL (ref 0.5–1.4)
EOSINOPHIL # BLD AUTO: 0.02 K/UL (ref 0–0.51)
EOSINOPHIL NFR BLD: 0.2 % (ref 0–6.9)
ERYTHROCYTE [DISTWIDTH] IN BLOOD BY AUTOMATED COUNT: 43.7 FL (ref 35.9–50)
GFR SERPLBLD CREATININE-BSD FMLA CKD-EPI: 102 ML/MIN/1.73 M 2
GLUCOSE BLD STRIP.AUTO-MCNC: 232 MG/DL (ref 65–99)
GLUCOSE BLD STRIP.AUTO-MCNC: 254 MG/DL (ref 65–99)
GLUCOSE BLD STRIP.AUTO-MCNC: 256 MG/DL (ref 65–99)
GLUCOSE BLD STRIP.AUTO-MCNC: 267 MG/DL (ref 65–99)
GLUCOSE SERPL-MCNC: 229 MG/DL (ref 65–99)
HCT VFR BLD AUTO: 38.9 % (ref 37–47)
HGB BLD-MCNC: 13.3 G/DL (ref 12–16)
IMM GRANULOCYTES # BLD AUTO: 0.12 K/UL (ref 0–0.11)
IMM GRANULOCYTES NFR BLD AUTO: 1.3 % (ref 0–0.9)
LYMPHOCYTES # BLD AUTO: 0.95 K/UL (ref 1–4.8)
LYMPHOCYTES NFR BLD: 10.1 % (ref 22–41)
MCH RBC QN AUTO: 29.6 PG (ref 27–33)
MCHC RBC AUTO-ENTMCNC: 34.2 G/DL (ref 32.2–35.5)
MCV RBC AUTO: 86.6 FL (ref 81.4–97.8)
MONOCYTES # BLD AUTO: 0.57 K/UL (ref 0–0.85)
MONOCYTES NFR BLD AUTO: 6.1 % (ref 0–13.4)
NEUTROPHILS # BLD AUTO: 7.68 K/UL (ref 1.82–7.42)
NEUTROPHILS NFR BLD: 81.6 % (ref 44–72)
NRBC # BLD AUTO: 0 K/UL
NRBC BLD-RTO: 0 /100 WBC (ref 0–0.2)
PLATELET # BLD AUTO: 267 K/UL (ref 164–446)
PMV BLD AUTO: 10.5 FL (ref 9–12.9)
POTASSIUM SERPL-SCNC: 3.4 MMOL/L (ref 3.6–5.5)
RBC # BLD AUTO: 4.49 M/UL (ref 4.2–5.4)
SODIUM SERPL-SCNC: 136 MMOL/L (ref 135–145)
WBC # BLD AUTO: 9.4 K/UL (ref 4.8–10.8)

## 2023-09-25 PROCEDURE — 82962 GLUCOSE BLOOD TEST: CPT

## 2023-09-25 PROCEDURE — A9270 NON-COVERED ITEM OR SERVICE: HCPCS | Performed by: HOSPITALIST

## 2023-09-25 PROCEDURE — 700111 HCHG RX REV CODE 636 W/ 250 OVERRIDE (IP): Mod: JZ | Performed by: HOSPITALIST

## 2023-09-25 PROCEDURE — A9270 NON-COVERED ITEM OR SERVICE: HCPCS | Performed by: INTERNAL MEDICINE

## 2023-09-25 PROCEDURE — 97166 OT EVAL MOD COMPLEX 45 MIN: CPT

## 2023-09-25 PROCEDURE — 700105 HCHG RX REV CODE 258: Performed by: INTERNAL MEDICINE

## 2023-09-25 PROCEDURE — 94760 N-INVAS EAR/PLS OXIMETRY 1: CPT

## 2023-09-25 PROCEDURE — 700102 HCHG RX REV CODE 250 W/ 637 OVERRIDE(OP): Performed by: INTERNAL MEDICINE

## 2023-09-25 PROCEDURE — 85025 COMPLETE CBC W/AUTO DIFF WBC: CPT

## 2023-09-25 PROCEDURE — 700111 HCHG RX REV CODE 636 W/ 250 OVERRIDE (IP): Performed by: INTERNAL MEDICINE

## 2023-09-25 PROCEDURE — 770001 HCHG ROOM/CARE - MED/SURG/GYN PRIV*

## 2023-09-25 PROCEDURE — 700102 HCHG RX REV CODE 250 W/ 637 OVERRIDE(OP): Performed by: HOSPITALIST

## 2023-09-25 PROCEDURE — 700111 HCHG RX REV CODE 636 W/ 250 OVERRIDE (IP): Mod: JZ | Performed by: INTERNAL MEDICINE

## 2023-09-25 PROCEDURE — 80048 BASIC METABOLIC PNL TOTAL CA: CPT

## 2023-09-25 PROCEDURE — 97162 PT EVAL MOD COMPLEX 30 MIN: CPT

## 2023-09-25 PROCEDURE — 36415 COLL VENOUS BLD VENIPUNCTURE: CPT

## 2023-09-25 PROCEDURE — 99232 SBSQ HOSP IP/OBS MODERATE 35: CPT | Performed by: INTERNAL MEDICINE

## 2023-09-25 RX ORDER — OXYCODONE HYDROCHLORIDE 5 MG/1
5 TABLET ORAL EVERY 4 HOURS PRN
Status: DISCONTINUED | OUTPATIENT
Start: 2023-09-25 | End: 2023-09-27 | Stop reason: HOSPADM

## 2023-09-25 RX ORDER — MORPHINE SULFATE 4 MG/ML
4 INJECTION INTRAVENOUS
Status: DISCONTINUED | OUTPATIENT
Start: 2023-09-25 | End: 2023-09-27 | Stop reason: HOSPADM

## 2023-09-25 RX ORDER — OXYCODONE HYDROCHLORIDE 10 MG/1
10 TABLET ORAL
Status: DISCONTINUED | OUTPATIENT
Start: 2023-09-25 | End: 2023-09-27 | Stop reason: HOSPADM

## 2023-09-25 RX ADMIN — INSULIN HUMAN 5 UNITS: 100 INJECTION, SOLUTION PARENTERAL at 11:34

## 2023-09-25 RX ADMIN — FAMOTIDINE 20 MG: 20 TABLET ORAL at 06:33

## 2023-09-25 RX ADMIN — INSULIN GLARGINE-YFGN 20 UNITS: 100 INJECTION, SOLUTION SUBCUTANEOUS at 16:57

## 2023-09-25 RX ADMIN — OXYCODONE HYDROCHLORIDE 10 MG: 10 TABLET ORAL at 20:41

## 2023-09-25 RX ADMIN — INSULIN GLARGINE-YFGN 20 UNITS: 100 INJECTION, SOLUTION SUBCUTANEOUS at 06:41

## 2023-09-25 RX ADMIN — INSULIN HUMAN 5 UNITS: 100 INJECTION, SOLUTION PARENTERAL at 16:57

## 2023-09-25 RX ADMIN — OXYCODONE HYDROCHLORIDE 10 MG: 10 TABLET ORAL at 13:53

## 2023-09-25 RX ADMIN — SENNOSIDES AND DOCUSATE SODIUM 2 TABLET: 50; 8.6 TABLET ORAL at 06:33

## 2023-09-25 RX ADMIN — INSULIN HUMAN 5 UNITS: 100 INJECTION, SOLUTION PARENTERAL at 06:40

## 2023-09-25 RX ADMIN — POLYETHYLENE GLYCOL 3350 1 PACKET: 17 POWDER, FOR SOLUTION ORAL at 17:04

## 2023-09-25 RX ADMIN — HEPARIN SODIUM 5000 UNITS: 5000 INJECTION, SOLUTION INTRAVENOUS; SUBCUTANEOUS at 15:52

## 2023-09-25 RX ADMIN — ONDANSETRON 4 MG: 2 INJECTION INTRAMUSCULAR; INTRAVENOUS at 01:01

## 2023-09-25 RX ADMIN — INSULIN HUMAN 3 UNITS: 100 INJECTION, SOLUTION PARENTERAL at 20:25

## 2023-09-25 RX ADMIN — ACETAMINOPHEN 650 MG: 325 TABLET ORAL at 01:01

## 2023-09-25 RX ADMIN — AMPICILLIN AND SULBACTAM 3 G: 1; 2 INJECTION, POWDER, FOR SOLUTION INTRAMUSCULAR; INTRAVENOUS at 17:06

## 2023-09-25 RX ADMIN — MORPHINE SULFATE 4 MG: 4 INJECTION INTRAVENOUS at 18:13

## 2023-09-25 RX ADMIN — AMPICILLIN AND SULBACTAM 3 G: 1; 2 INJECTION, POWDER, FOR SOLUTION INTRAMUSCULAR; INTRAVENOUS at 12:00

## 2023-09-25 RX ADMIN — AMPICILLIN AND SULBACTAM 3 G: 1; 2 INJECTION, POWDER, FOR SOLUTION INTRAMUSCULAR; INTRAVENOUS at 06:40

## 2023-09-25 RX ADMIN — HEPARIN SODIUM 5000 UNITS: 5000 INJECTION, SOLUTION INTRAVENOUS; SUBCUTANEOUS at 22:32

## 2023-09-25 RX ADMIN — SENNOSIDES AND DOCUSATE SODIUM 2 TABLET: 50; 8.6 TABLET ORAL at 17:04

## 2023-09-25 ASSESSMENT — ENCOUNTER SYMPTOMS
SPEECH CHANGE: 0
INSOMNIA: 0
FEVER: 0
CHILLS: 0
CLAUDICATION: 0
SHORTNESS OF BREATH: 0
COUGH: 0
SORE THROAT: 0
DIARRHEA: 0
PHOTOPHOBIA: 0
DIZZINESS: 0
ABDOMINAL PAIN: 0
BLURRED VISION: 0
CONSTIPATION: 0
MYALGIAS: 0
VOMITING: 0
WEAKNESS: 0
NERVOUS/ANXIOUS: 1
HEARTBURN: 0
DEPRESSION: 0
SENSORY CHANGE: 0
HEADACHES: 0

## 2023-09-25 ASSESSMENT — ACTIVITIES OF DAILY LIVING (ADL): TOILETING: INDEPENDENT

## 2023-09-25 ASSESSMENT — PAIN DESCRIPTION - PAIN TYPE
TYPE: ACUTE PAIN

## 2023-09-25 ASSESSMENT — COGNITIVE AND FUNCTIONAL STATUS - GENERAL
MOVING FROM LYING ON BACK TO SITTING ON SIDE OF FLAT BED: UNABLE
DAILY ACTIVITIY SCORE: 13
DRESSING REGULAR LOWER BODY CLOTHING: A LOT
CLIMB 3 TO 5 STEPS WITH RAILING: TOTAL
STANDING UP FROM CHAIR USING ARMS: TOTAL
TOILETING: A LOT
PERSONAL GROOMING: A LOT
EATING MEALS: A LITTLE
TURNING FROM BACK TO SIDE WHILE IN FLAT BAD: UNABLE
SUGGESTED CMS G CODE MODIFIER DAILY ACTIVITY: CL
DRESSING REGULAR UPPER BODY CLOTHING: A LOT
WALKING IN HOSPITAL ROOM: TOTAL
MOVING TO AND FROM BED TO CHAIR: UNABLE
SUGGESTED CMS G CODE MODIFIER MOBILITY: CN
MOBILITY SCORE: 6
HELP NEEDED FOR BATHING: A LOT

## 2023-09-25 ASSESSMENT — PATIENT HEALTH QUESTIONNAIRE - PHQ9
1. LITTLE INTEREST OR PLEASURE IN DOING THINGS: NOT AT ALL
SUM OF ALL RESPONSES TO PHQ9 QUESTIONS 1 AND 2: 0
2. FEELING DOWN, DEPRESSED, IRRITABLE, OR HOPELESS: NOT AT ALL

## 2023-09-25 ASSESSMENT — GAIT ASSESSMENTS: GAIT LEVEL OF ASSIST: UNABLE TO PARTICIPATE

## 2023-09-25 NOTE — CARE PLAN
Problem: Fall Risk  Goal: Patient will remain free from falls  Outcome: Progressing     Problem: Hemodynamics  Goal: Patient's hemodynamics, fluid balance and neurologic status will be stable or improve  Outcome: Progressing     Problem: Urinary - Renal Perfusion  Goal: Ability to achieve and maintain adequate renal perfusion and functioning will improve  Outcome: Progressing     The patient is Stable - Low risk of patient condition declining or worsening    Shift Goals  Clinical Goals: Continue skin breakdown prevention interventions throughout shift; ensure pt remains free from falls or injury  Patient Goals: Rest comfortably throughout shift  Family Goals: n/a    Progress made toward(s) clinical / shift goals: q2hr turns remained in place throughout shift to prevent skin breakdown. Sacral and heel offloading dressings in place. Heels floated with pillows. Pt able to stand and pivot to commode with 2x assist with FWW.    Patient is not progressing towards the following goals: n/a

## 2023-09-25 NOTE — CARE PLAN
"The patient is Watcher - Medium risk of patient condition declining or worsening    Shift Goals  Clinical Goals: Skin Protection, PT/OT eval, fall prevention  Patient Goals: \"I want to go home\"  Family Goals: IVA (no family at bedside)    Progress made toward(s) clinical / shift goals:      Patient remains free of further skin breakdown. Patient remains free from falls.     Problem: Skin Integrity  Goal: Skin integrity is maintained or improved  Outcome: Progressing     Problem: Fall Risk  Goal: Patient will remain free from falls  Outcome: Progressing       Patient is not progressing towards the following goals:    Patient does not display or verbalize understanding of teachings. Patient remains not having bowel movement.     Problem: Knowledge Deficit - Standard  Goal: Patient and family/care givers will demonstrate understanding of plan of care, disease process/condition, diagnostic tests and medications  Outcome: Not Progressing     Problem: Bowel Elimination  Goal: Establish and maintain regular bowel function  Outcome: Not Progressing     "

## 2023-09-25 NOTE — THERAPY
"Occupational Therapy   Initial Evaluation     Patient Name: Fannie Driver  Age:  63 y.o., Sex:  female  Medical Record #: [id number]  Today's Date: 9/25/2023     Precautions  Precautions: Fall Risk    Assessment  Patient is 63 y.o. female admitted 9/23/2023 with DKA, vulva cellulitis, Leukocytosis, MAICOL.  H/o methamphetamine use, hx of DVT.  Pt resistant to OOB with therapy, req maxA sup to sit and to return to supine.  Refused to try standing.  MaxA for self care tasks except feeding.  Crying t/o session, asking for kids, c/o pain.  RN aware.  At this time pt does not demo ability to care for self and will need further inpt post acute therapy prior to home.  OT will follow while in house.        Plan    Occupational Therapy Initial Treatment Plan   Treatment Interventions: Self Care / Activities of Daily Living, Adaptive Equipment, Neuro Re-Education / Balance, Therapeutic Exercises, Therapeutic Activity  Treatment Frequency: 3 Times per Week  Duration: Until Therapy Goals Met    DC Equipment Recommendations: Unable to determine at this time  Discharge Recommendations: Recommend post-acute placement for additional occupational therapy services prior to discharge home     Subjective    \"OWWWW.... Neuropathy...\"     Objective       09/25/23 1229   Prior Living Situation   Prior Services Unable To Determine At This Time   Housing / Facility 1 Story House   Steps Into Home 1   Steps In Home 0   Bathroom Set up Bathtub / Shower Combination;Tub Transfer Bench   Equipment Owned 4-Wheel Walker;Tub Transfer Bench   Lives with - Patient's Self Care Capacity Adult Children   Comments Pt unable to elborate on how much help avail or what kids may help with at home   Prior Level of ADL Function   Self Feeding Independent   Grooming / Hygiene Independent   Bathing Independent   Dressing Independent   Toileting Independent   Prior Level of IADL Function   Medication Management Unable To Determine At This Time   Laundry Unable To " "Determine At This Time   Kitchen Mobility Unable To Determine At This Time   Finances Unable To Determine At This Time   Home Management Unable To Determine At This Time   Shopping Unable To Determine At This Time   Prior Level Of Mobility Independent With Device in Home   Driving / Transportation Unable To Determine At This Time   Occupation (Pre-Hospital Vocational) Unable To Determine At This Time   Leisure Interests Unable To Determine At This Time   Precautions   Precautions Fall Risk   Vitals   O2 Delivery Device None - Room Air   Pain 0 - 10 Group   Location Generalized;Foot   Location Orientation Right;Left   Description Tender   Therapist Pain Assessment During Activity;Nurse Notified  (Pt wailing out in pain with attempts to don socks then crying t/o the rest of the session)   Cognition    Cognition / Consciousness X   Level of Consciousness Responds to voice   Comments lethargic, emotionally labile.  Crying, moaning t/o session, repeating \"I want my kids\"   Active ROM Upper Body   Active ROM Upper Body  WDL   Strength Upper Body   Upper Body Strength    (diff to assess, pt not fully participating in eval)   Sensation Upper Body   Comments appears intact, pt reports Neuropathy in B feet however that is highly sensitive   Coordination Upper Body   Comments appears grossly intact   Balance Assessment   Sitting Balance (Static) Fair   Sitting Balance (Dynamic) Fair -   Standing Balance (Static)   (Refused)   Weight Shift Sitting Absent   Weight Shift Standing   (refused to stand)   Bed Mobility    Supine to Sit Maximal Assist   Sit to Supine Maximal Assist   Scooting Maximal Assist   ADL Assessment   Eating Minimal Assist   Upper Body Dressing Maximal Assist   Lower Body Dressing Maximal Assist   Toileting Maximal Assist   Comments marielle currently   How much help from another person does the patient currently need...   Putting on and taking off regular lower body clothing? 2   Bathing (including washing, " "rinsing, and drying)? 2   Toileting, which includes using a toilet, bedpan, or urinal? 2   Putting on and taking off regular upper body clothing? 2   Taking care of personal grooming such as brushing teeth? 2   Eating meals? 3   6 Clicks Daily Activity Score 13   Functional Mobility   Sit to Stand Refused   Bed, Chair, Wheelchair Transfer Refused   Toilet Transfers Refused   Mobility EOB only   Visual Perception   Comments diff to assess, keeping eyes closed t/o session   Edema / Skin Assessment   Comments face red with noted scabs, rest of skin NT   Activity Tolerance   Sitting Edge of Bed 2-3 min   Comments crying seated EOB   Patient / Family Goals   Patient / Family Goal #1 \"I want to go home\"   Short Term Goals   Short Term Goal # 1 Pt will transfer to C for toileting with Jorge in 3 visits   Short Term Goal # 2 Pt will toilet on commode SBA in 4 visits   Short Term Goal # 3 Pt will dress LB from seated with CGA in 5 visits                 "
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
## 2023-09-25 NOTE — THERAPY
Physical Therapy   Initial Evaluation     Patient Name: Fannie Driver  Age:  63 y.o., Sex:  female  Medical Record #: [id number]  Today's Date: 9/25/2023     Precautions  Precautions: Fall Risk    Assessment  Patient is 63 y.o. female with a diagnosis of DKA, MAICOL, vulval cellulitis. PMH: DM. Pt is presenting with weakness as well as c/o pain B feet which seem very sensitive to touch. While PT was putting on socks pt started crying stating her feet are very painful. Pt continued to cry or the rest of the eval, affect is child-like.  Pt refused to try standing up, only sat EOB x3 min, unable to participate further with therapy due to the intensity of her crying so returned pt RORY. RN updated.       Plan    Physical Therapy Initial Treatment Plan   Treatment Plan : Bed Mobility, Gait Training, Stair Training, Therapeutic Activities, Therapeutic Exercise, Neuro Re-Education / Balance  Treatment Frequency: 3 Times per Week  Duration: Until Therapy Goals Met    Recommend post- acute placement for further therapy prior to DC home.      09/25/23 1230   Prior Living Situation   Housing / Facility 1 Story House   Steps Into Home 1   Steps In Home 0   Equipment Owned 4-Wheel Walker;Tub Transfer Bench   Lives with - Patient's Self Care Capacity Adult Children   Comments Pt lives with son, unable to determine how much assist he can provide   Prior Level of Functional Mobility   Bed Mobility Independent   Transfer Status Independent   Ambulation Independent   Assistive Devices Used 4-Wheel Walker   Cognition    Comments Pt labile throughout session, stated feet hurt and does like them touched or moved. Once mobilizing pt crying throughout session, c/o foot pain and asking for her children, also stating wants to DC home.   Sensation Upper Body   Comments hypersensative to touch   Active ROM Lower Body    Active ROM Lower Body  X   Strength Lower Body   Comments unable to assess, very little active movement B LE   Bed Mobility     Supine to Sit Maximal Assist  (x2)   Sit to Supine Maximal Assist  (x2)   Rolling Maximum Assist to Lt.;Maximal Assist to Rt.   Gait Analysis   Gait Level Of Assist Unable to Participate   Functional Mobility   Sit to Stand Refused   Bed, Chair, Wheelchair Transfer Refused   Activity Tolerance   Sitting Edge of Bed 3 min   Short Term Goals    Short Term Goal # 1 Pt will be able to perform bed mobility and sup < >sit SBA in 6 visits.   Short Term Goal # 2 Pt will be able to perform sit <> stand and transfer with FWW SBA in 6 visits.   Short Term Goal # 3 Pt will be able to ambulate 100 ft with FWW Jennifer in 6 visits.

## 2023-09-25 NOTE — PROGRESS NOTES
Hospital Medicine Daily Progress Note    Date of Service  9/25/2023    Chief Complaint  Fannie Driver is a 63 y.o. female admitted 9/23/2023 with DKA    Hospital Course  Patient is a 63-year-old female who was transferred from [facility] secondary to DKA.  She had a glucose of 708 a bicarb of 5 and an anion gap greater than 30 at time of transfer.  COVID and flu were negative and she was initially started on Rocephin and transferred for further management.  History of methamphetamine abuse history of DVT without anticoagulation.  Patient currently denies any type of drug use.  Patient found to have vulvar cellulitis and antibiotics changed to Unasyn.  Her anion gap closed on 924 and she was transition out of the intensive care unit to the medical floor.    Interval Problem Update  9/25 patient states she is in pain and states its mostly in the groin area.  She is very tearful at time of examination.  She does states that she feels poorly.  DKA has since resolved and she is tolerating a diabetic diet.  Pending PT OT evaluations.  At beginning of shift she was on 6 L nasal cannula and now is currently on room air.    I have discussed this patient's plan of care and discharge plan at IDT rounds today with Case Management, Nursing, Nursing leadership, and other members of the IDT team.    Consultants/Specialty  critical care - s/o    Code Status  Full Code    Disposition  The patient is not medically cleared for discharge to home or a post-acute facility.  Anticipate discharge to: home with close outpatient follow-up    I have placed the appropriate orders for post-discharge needs.    Review of Systems  Review of Systems   Constitutional:  Negative for chills and fever.   HENT:  Negative for congestion and sore throat.    Eyes:  Negative for blurred vision and photophobia.   Respiratory:  Negative for cough and shortness of breath.    Cardiovascular:  Negative for chest pain, claudication and leg swelling.    Gastrointestinal:  Negative for abdominal pain, constipation, diarrhea, heartburn and vomiting.   Genitourinary:  Negative for dysuria and hematuria.        Perineal pain, worse on the left side   Musculoskeletal:  Negative for joint pain and myalgias.   Skin:  Negative for itching and rash.   Neurological:  Negative for dizziness, sensory change, speech change, weakness and headaches.   Psychiatric/Behavioral:  Negative for depression. The patient is nervous/anxious. The patient does not have insomnia.         Physical Exam  Temp:  [36.3 °C (97.3 °F)-36.7 °C (98.1 °F)] 36.7 °C (98 °F)  Pulse:  [] 93  Resp:  [15-17] 16  BP: (121-134)/(55-79) 132/65  SpO2:  [93 %-99 %] 97 %    Physical Exam  Vitals and nursing note reviewed.   Constitutional:       General: She is not in acute distress.     Appearance: Normal appearance. She is not ill-appearing.   HENT:      Head: Normocephalic and atraumatic.      Nose: Nose normal.   Cardiovascular:      Rate and Rhythm: Normal rate and regular rhythm.      Heart sounds: Normal heart sounds. No murmur heard.  Pulmonary:      Effort: Pulmonary effort is normal.      Breath sounds: Normal breath sounds.   Abdominal:      General: Bowel sounds are normal. There is no distension.      Palpations: Abdomen is soft.   Genitourinary:     Comments: Slight erythema left vulva, tender to any motion  Musculoskeletal:         General: No swelling or tenderness.      Cervical back: Neck supple.   Skin:     General: Skin is warm and dry.   Neurological:      General: No focal deficit present.      Mental Status: She is alert and oriented to person, place, and time.   Psychiatric:      Comments: Anxious and tearful         Fluids    Intake/Output Summary (Last 24 hours) at 9/25/2023 1135  Last data filed at 9/25/2023 1031  Gross per 24 hour   Intake 780 ml   Output 1050 ml   Net -270 ml       Laboratory  Recent Labs     09/23/23  0720 09/24/23  0400 09/25/23  0241   WBC 21.0* 12.6* 9.4    RBC 4.90 4.18* 4.49   HEMOGLOBIN 14.7 12.6 13.3   HEMATOCRIT 42.5 35.9* 38.9   MCV 86.7 85.9 86.6   MCH 30.0 30.1 29.6   MCHC 34.6 35.1 34.2   RDW 40.7 41.3 43.7   PLATELETCT 379 278 267   MPV 10.5 10.2 10.5     Recent Labs     09/24/23  0708 09/24/23  1636 09/25/23  0241   SODIUM 136 134* 136   POTASSIUM 3.8 3.8 3.4*   CHLORIDE 109 106 108   CO2 16* 15* 16*   GLUCOSE 138* 220* 229*   BUN 16 14 13   CREATININE 0.72 0.64 0.58   CALCIUM 9.1 9.0 9.2                   Imaging  DX-CHEST-PORTABLE (1 VIEW)   Final Result      1.  No acute cardiopulmonary disease.   2.  Peculiar curvilinear lucency and soft tissue shadows project over the left hemithorax. This is not thought to represent pneumothorax. If there is clinical concern based on physical exam, repeat chest x-ray could be obtained.           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 Not currently on anticoagulation.     MAICOL (acute kidney injury) (HCC)- (present on admission)  Assessment & Plan  Acute kidney injury has resolved, last creatinine 0.58, DC IV fluids           VTE prophylaxis:    heparin ppx      I have performed a physical exam and reviewed and updated ROS and Plan today (9/25/2023). In review of yesterday's note (9/24/2023), there are no changes except as documented above.

## 2023-09-25 NOTE — DIETARY
"Nutrition services: Day 2 of admit.  Fannie Driver is a 63 y.o. female with admitting DX of DKA, type 2, not at goal     Consult received for MST of 4 on nutrition screen due to report of 24-33 lb wt loss x > 1 year and poor PO PTA. Pt also w/ consult for DM diet ed    Assessment:  Height: 172.7 cm (5' 8\")  Admit Weight: 71.8 kg (158 lb 4.6 oz)  Body mass index is 24 kg/m²., BMI classification: WNL  Diet/Intake: consistent CHO (diabetic)/<25% to 25-50%    Evaluation:   MD note states that pt is tearful. She is in pain and does not feel well. RD tried to visit pt. Partially eaten lunch of tray table when RD visited. Pt uncomfortable during visit and whispered that she wanted RD to come back later to provide DM diet ed. Diet ed and interview for wt/PO hx not appropriate at this time.   Wt hx reviewed in Epic. Most recent wt of 81.2 kg is much higher than admit weight of 71.8 kg . Admit wt is most likely accurate. Pt is currently +4.4 liters of fluids. Wt hx reviewed in Epic. Pt w/ significant wt loss of 7.9% x 2.5 months, 25% x 7 months and 31% x 1 year.    Pt currently has poor PO intake. She may benefit from supplement of Boost Glucose Control for additional kcals and protein.  Labs: 9/25/23: glucose=229. 7/9/22: A1c=13.7. POC glucose: 201-256  Meds: Unasyn, Lantus, SSI    Malnutrition Risk: unable to determine. Significant wt loss of 31% x 1 year and 7.9% x 2.5 months noted. Current PO intake has been poor.     Recommendations/Plan:  Add Boost Glucose Control to meals TID   Encourage intake of >50%  Document intake of all meals and supplements  as % taken in ADL's to provide interdisciplinary communication across all shifts.   Monitor weight.  Nutrition rep will continue to see patient for ongoing meal and snack preferences.       RD will follow up for DM diet ed and PO/wt hx interview.   "

## 2023-09-25 NOTE — HOSPITAL COURSE
Patient is a 63-year-old female who was transferred from [facility] secondary to DKA.  She had a glucose of 708 a bicarb of 5 and an anion gap greater than 30 at time of transfer.  COVID and flu were negative and she was initially started on Rocephin and transferred for further management.  History of methamphetamine abuse history of DVT without anticoagulation.  Patient currently denies any type of drug use.  Patient found to have vulvar cellulitis and antibiotics changed to Unasyn.  Her anion gap closed on 9/24 and she was transition out of the intensive care unit to the medical floor.

## 2023-09-25 NOTE — PROGRESS NOTES
"    1310 - Patient's sister Alisha called and spoke with this RN. Alisha, stated that they recently lost their youngest sister and mother wilorainein the last two years and there has been growing concerns regarding the patient. Patient's sister stated that there is a lack of care at the patient's home and that the son collects funds as the patient's primary care provider and is not following through with safely caring for patient. Patient's sister stated that patient's son is not providing patient with ADL's, groceries for adequate nutrition, bathing, and will \"take off\" for multiple days and nights at a time leaving the patient alone. Patient's sister also states that she believes son is possibly providing patient methamphetamine for usage as he uses as well. This RN encouraged reporting to APS and assured sister that patient is not in a position to safely discharge home at this time as this was a primary concern of hers. Charge RN Vikash made aware of conversation with patient family member. Advised by SW to reach out to APS and report.     1330 - Patient's other sister Tabatha called this RN to check status of patient. Tabatha verified that patient is not receiving adequate care at home and that patient's daughter, whom does not live in [place], would like to transition mother into living with her full time after outpatient placement at rehab or SNF has been completed. Tabatha stated that patients son is not reliable.    Patient's daughter currently does not have cell phone so both of patient's sister request info as things progress to assist with safe discharge planning.     1400 - Left message for APS to return call for timely reporting.   "

## 2023-09-26 PROBLEM — R53.1 GENERALIZED WEAKNESS: Status: ACTIVE | Noted: 2023-09-26

## 2023-09-26 PROBLEM — E87.6 HYPOKALEMIA: Status: ACTIVE | Noted: 2023-09-26

## 2023-09-26 LAB
ANION GAP SERPL CALC-SCNC: 10 MMOL/L (ref 7–16)
BUN SERPL-MCNC: 11 MG/DL (ref 8–22)
CALCIUM SERPL-MCNC: 9.5 MG/DL (ref 8.4–10.2)
CHLORIDE SERPL-SCNC: 107 MMOL/L (ref 96–112)
CO2 SERPL-SCNC: 21 MMOL/L (ref 20–33)
CREAT SERPL-MCNC: 0.56 MG/DL (ref 0.5–1.4)
ERYTHROCYTE [DISTWIDTH] IN BLOOD BY AUTOMATED COUNT: 45.3 FL (ref 35.9–50)
GFR SERPLBLD CREATININE-BSD FMLA CKD-EPI: 103 ML/MIN/1.73 M 2
GLUCOSE BLD STRIP.AUTO-MCNC: 196 MG/DL (ref 65–99)
GLUCOSE BLD STRIP.AUTO-MCNC: 215 MG/DL (ref 65–99)
GLUCOSE BLD STRIP.AUTO-MCNC: 233 MG/DL (ref 65–99)
GLUCOSE BLD STRIP.AUTO-MCNC: 299 MG/DL (ref 65–99)
GLUCOSE SERPL-MCNC: 197 MG/DL (ref 65–99)
HCT VFR BLD AUTO: 38.5 % (ref 37–47)
HGB BLD-MCNC: 13 G/DL (ref 12–16)
MCH RBC QN AUTO: 29.6 PG (ref 27–33)
MCHC RBC AUTO-ENTMCNC: 33.8 G/DL (ref 32.2–35.5)
MCV RBC AUTO: 87.7 FL (ref 81.4–97.8)
PLATELET # BLD AUTO: 244 K/UL (ref 164–446)
PMV BLD AUTO: 10.6 FL (ref 9–12.9)
POTASSIUM SERPL-SCNC: 3.1 MMOL/L (ref 3.6–5.5)
RBC # BLD AUTO: 4.39 M/UL (ref 4.2–5.4)
SODIUM SERPL-SCNC: 138 MMOL/L (ref 135–145)
WBC # BLD AUTO: 10.6 K/UL (ref 4.8–10.8)

## 2023-09-26 PROCEDURE — 700102 HCHG RX REV CODE 250 W/ 637 OVERRIDE(OP): Performed by: INTERNAL MEDICINE

## 2023-09-26 PROCEDURE — 770006 HCHG ROOM/CARE - MED/SURG/GYN SEMI*

## 2023-09-26 PROCEDURE — 82962 GLUCOSE BLOOD TEST: CPT | Mod: 91

## 2023-09-26 PROCEDURE — 700111 HCHG RX REV CODE 636 W/ 250 OVERRIDE (IP): Performed by: INTERNAL MEDICINE

## 2023-09-26 PROCEDURE — 80048 BASIC METABOLIC PNL TOTAL CA: CPT

## 2023-09-26 PROCEDURE — 700105 HCHG RX REV CODE 258: Performed by: INTERNAL MEDICINE

## 2023-09-26 PROCEDURE — 99232 SBSQ HOSP IP/OBS MODERATE 35: CPT | Performed by: HOSPITALIST

## 2023-09-26 PROCEDURE — 94760 N-INVAS EAR/PLS OXIMETRY 1: CPT

## 2023-09-26 PROCEDURE — 85027 COMPLETE CBC AUTOMATED: CPT

## 2023-09-26 PROCEDURE — 700102 HCHG RX REV CODE 250 W/ 637 OVERRIDE(OP): Performed by: HOSPITALIST

## 2023-09-26 PROCEDURE — 97602 WOUND(S) CARE NON-SELECTIVE: CPT

## 2023-09-26 PROCEDURE — 36415 COLL VENOUS BLD VENIPUNCTURE: CPT

## 2023-09-26 PROCEDURE — A9270 NON-COVERED ITEM OR SERVICE: HCPCS | Performed by: HOSPITALIST

## 2023-09-26 PROCEDURE — A9270 NON-COVERED ITEM OR SERVICE: HCPCS | Performed by: INTERNAL MEDICINE

## 2023-09-26 RX ORDER — POTASSIUM CHLORIDE 20 MEQ/1
20 TABLET, EXTENDED RELEASE ORAL 3 TIMES DAILY
Status: DISCONTINUED | OUTPATIENT
Start: 2023-09-26 | End: 2023-09-27 | Stop reason: HOSPADM

## 2023-09-26 RX ORDER — AMOXICILLIN AND CLAVULANATE POTASSIUM 875; 125 MG/1; MG/1
1 TABLET, FILM COATED ORAL EVERY 12 HOURS
Status: DISCONTINUED | OUTPATIENT
Start: 2023-09-26 | End: 2023-09-27 | Stop reason: HOSPADM

## 2023-09-26 RX ADMIN — POTASSIUM CHLORIDE 20 MEQ: 1500 TABLET, EXTENDED RELEASE ORAL at 18:01

## 2023-09-26 RX ADMIN — INSULIN GLARGINE-YFGN 20 UNITS: 100 INJECTION, SOLUTION SUBCUTANEOUS at 17:03

## 2023-09-26 RX ADMIN — INSULIN HUMAN 2 UNITS: 100 INJECTION, SOLUTION PARENTERAL at 06:35

## 2023-09-26 RX ADMIN — OXYCODONE HYDROCHLORIDE 10 MG: 10 TABLET ORAL at 22:01

## 2023-09-26 RX ADMIN — INSULIN HUMAN 3 UNITS: 100 INJECTION, SOLUTION PARENTERAL at 21:43

## 2023-09-26 RX ADMIN — OXYCODONE HYDROCHLORIDE 10 MG: 10 TABLET ORAL at 17:10

## 2023-09-26 RX ADMIN — AMPICILLIN AND SULBACTAM 3 G: 1; 2 INJECTION, POWDER, FOR SOLUTION INTRAMUSCULAR; INTRAVENOUS at 05:37

## 2023-09-26 RX ADMIN — AMPICILLIN AND SULBACTAM 3 G: 1; 2 INJECTION, POWDER, FOR SOLUTION INTRAMUSCULAR; INTRAVENOUS at 00:16

## 2023-09-26 RX ADMIN — INSULIN HUMAN 5 UNITS: 100 INJECTION, SOLUTION PARENTERAL at 17:04

## 2023-09-26 RX ADMIN — HEPARIN SODIUM 5000 UNITS: 5000 INJECTION, SOLUTION INTRAVENOUS; SUBCUTANEOUS at 14:00

## 2023-09-26 RX ADMIN — AMOXICILLIN AND CLAVULANATE POTASSIUM 1 TABLET: 875; 125 TABLET, FILM COATED ORAL at 11:24

## 2023-09-26 RX ADMIN — HEPARIN SODIUM 5000 UNITS: 5000 INJECTION, SOLUTION INTRAVENOUS; SUBCUTANEOUS at 05:38

## 2023-09-26 RX ADMIN — INSULIN GLARGINE-YFGN 20 UNITS: 100 INJECTION, SOLUTION SUBCUTANEOUS at 05:44

## 2023-09-26 RX ADMIN — SENNOSIDES AND DOCUSATE SODIUM 2 TABLET: 50; 8.6 TABLET ORAL at 05:38

## 2023-09-26 RX ADMIN — INSULIN HUMAN 3 UNITS: 100 INJECTION, SOLUTION PARENTERAL at 11:18

## 2023-09-26 RX ADMIN — RIVAROXABAN 10 MG: 10 TABLET, FILM COATED ORAL at 18:01

## 2023-09-26 ASSESSMENT — PAIN DESCRIPTION - PAIN TYPE
TYPE: ACUTE PAIN

## 2023-09-26 ASSESSMENT — ENCOUNTER SYMPTOMS
COUGH: 0
PALPITATIONS: 0
VOMITING: 0
ORTHOPNEA: 0
DIZZINESS: 0
CHILLS: 0
HEMOPTYSIS: 0
NAUSEA: 0
WEAKNESS: 1
SPUTUM PRODUCTION: 0

## 2023-09-26 NOTE — WOUND TEAM
[facility]  Inpatient Services  Initial Wound and Skin Care Evaluation    Admission Date: 9/23/2023     Last order of IP CONSULT TO WOUND CARE was found on 9/25/2023 from Hospital Encounter on 9/23/2023     HPI, PMH, SH: Reviewed    Past Surgical History:   Procedure Laterality Date    DENTAL OSTEOTOMY N/A 2/6/2023    Procedure: I&D OF LEFT BUCCAL ABSCESS; EXTRACTION OF TEETH;  Surgeon: Lore Dukes D.D.S.;  Location: SURGERY SAME DAY [facility];  Service: Oral Surgery    ERCP IN OR  10/30/2009    Performed by CESAR BATES at SURGERY [facility] ORS    CHOLECYSTECTOMY  10/09    OTHER ABDOMINAL SURGERY      tubal ligation     Social History     Tobacco Use    Smoking status: Never    Smokeless tobacco: Never   Substance Use Topics    Alcohol use: Not on file     No chief complaint on file.    Diagnosis: DKA, type 2, not at goal (HCC) [E11.10]    Unit where seen by Wound Team: 2217/01     WOUND CONSULT RELATED TO:  ankle, labia/groin, heel    WOUND TEAM PLAN OF CARE - Frequency of Follow-up:   Nursing to follow dressing orders written for wound care. Contact wound team if area fails to progress, deteriorates or with any questions/concerns if something comes up before next scheduled follow up (See below as to whether wound is following and frequency of wound follow up)   Not following, consult as needed  - L labia/groin, L heel, R anterior ankle    WOUND HISTORY:   Pt admitted with wounds to L heel, R ankle and edema to L groin/labia       WOUND ASSESSMENT/LDA  Wound 09/23/23 Other (comment) Labia;Vagina;Groin Left (Active)   Date First Assessed/Time First Assessed: 09/23/23 0700   Present on Original Admission: Yes  Primary Wound Type: (c) Other (comment)  Location: Labia;Vagina;Groin  Laterality: Left                                   Assessments 9/26/2023  4:00 PM   Site Assessment Pink;Edema;Intact   Periwound Assessment Intact   Margins Attached edges;Defined edges   Closure None   Drainage  Amount None   Wound Cleansing Not Applicable   Periwound Protectant Not Applicable   Dressing Status Open to Air   Wound Team Following Not following       Wound 09/23/23 Pressure Injury Heel Medial Left POA St 2 (Active)   Date First Assessed/Time First Assessed: 09/23/23 0700   Present on Original Admission: Yes  Primary Wound Type: Pressure Injury  Location: Heel  Wound Orientation: Medial  Laterality: Left  Wound Description (Comments): POA St 2      Assessments 9/26/2023  4:00 PM          Site Assessment Pink;White;Boggy   Periwound Assessment Intact   Margins Attached edges;Defined edges   Closure None   Drainage Amount None   Treatments Cleansed;Nonselective debridement   Wound Cleansing Normal Saline Irrigation   Periwound Protectant Not Applicable   Dressing Status Intact   Dressing Changed Observed   Dressing Cleansing/Solutions Not Applicable   Dressing Options Offloading Dressing - Heel   Dressing Change/Treatment Frequency Every 72 hrs, and As Needed   NEXT Dressing Change/Treatment Date 09/28/23   NEXT Weekly Photo (Inpatient Only) 10/03/23   Wound Team Following Not following   Pressure Injury Stage Stage 2   Wound Length (cm) 3 cm   Wound Width (cm) 2 cm   Wound Surface Area (cm^2) 6 cm^2   Shape circular        Vascular:    TARSHA:   No results found.    Lab Values:    Lab Results   Component Value Date/Time    WBC 10.6 09/26/2023 01:18 AM    RBC 4.39 09/26/2023 01:18 AM    HEMOGLOBIN 13.0 09/26/2023 01:18 AM    HEMATOCRIT 38.5 09/26/2023 01:18 AM    HBA1C 13.7 (H) 07/09/2022 12:08 AM         Culture Results show:  No results found for this or any previous visit (from the past 720 hour(s)).    Pain Level/Medicated:   c/o pain despite not being touched        INTERVENTIONS BY WOUND TEAM:  Chart and images reviewed. Discussed with bedside RN. All areas of concern (based on picture review, LDA review and discussion with bedside RN) have been thoroughly assessed. Documentation of areas based on significant  findings. This RN in to assess patient. Performed standard wound care which includes appropriate positioning, dressing removal and non-selective debridement. Pictures and measurements obtained weekly if/when required.    Wound:  L heel, R ankle anterior  Preparation for Dressing removal: Removed without difficulty  Cleansed/Non-selectively Debrided with:  Normal Saline and Gauze  Lindy wound: Cleansed with Normal Saline and Gauze, Prepped with N/A  Primary Dressing:  heel offloading drsg    Advanced Wound Care Discharge Planning  Number of Clinicians necessary to complete wound care: 1  Is patient requiring IV pain medications for dressing changes:  No   Length of time for dressing change 35 min. (This does not include chart review, pre-medication time, set up, clean up or time spent charting.)    Interdisciplinary consultation: Patient, Bedside RN (Jessie)    EVALUATION / RATIONALE FOR TREATMENT:     Date:  09/26/23  Wound Status:  Initial evaluation    Pt has deflating St 2 pressure injury blister. Heel offloading drsg in place with heels floated off pillow.  R anterior ankle with dry crusted skin. Interrupted with intact skin between each crust ( almost like imprint from sock). L labia/groin is edematous and pink. No pain when palpated and no open wound.         Goals: Steady decrease in wound area and depth weekly.    NURSING PLAN OF CARE ORDERS:  RN Prevention Protocol    NUTRITION RECOMMENDATIONS   Wound Team Recommendations:  N/A    DIET ORDERS (From admission to next 24h)       Start     Ordered    09/25/23 1418  Supplements  ALL MEALS        Question:  Which Supplement  Answer:  BOOST GLUCOSE CONTROL    09/25/23 1418    09/24/23 0849  Diet Order Diet: Consistent CHO (Diabetic)  ALL MEALS        Question:  Diet:  Answer:  Consistent CHO (Diabetic)    09/24/23 0848                    PREVENTATIVE INTERVENTIONS:    Q shift Rene - performed per nursing policy  Q shift pressure point assessments - performed per  nursing policy    Surface/Positioning  Standard/trauma mattress - Currently in Place  Reposition q 2 hours - Currently in Place    Offloading/Redistribution  Heel offloading dressing (Silicone dressing) - Currently in Place  Float Heels off Bed with Pillows - Currently in Place           Containment/Moisture Prevention    Vivas Catheter - Currently in Place    Anticipated discharge plans:  TBD        Vac Discharge Needs:  Vac Discharge plan is purely a recommendation from wound team and not a requirement for discharge unless otherwise stated by physician.  Not Applicable Pt not on a wound vac

## 2023-09-26 NOTE — DIETARY
Nutrition Update:    Day 3 of admit.  Fannie Driver is a 63 y.o. female with admitting DX of DKA, type 2, not at goal (HCC) [E11.10].  Patient being followed to optimize nutrition.    Current Diet: consistent CHO (diabetic) diet with Boost GC TID    Recorded PO of meals is still poor at 0 to 25-50%. PO of Boost Glucose Control %.     RD visited pt in her room. She confirmed that she likes the Boost. She is preferring liquids to solids at this time. She agreed to [employer] TID at snack for additional kcals and protein.     Pt noted to have moderate muscle loss in her temple, clavicle, and shoulder region. Significant wt loss of 31% noted x 1 year and 7.9% x 2.5 months. Pt said that wt loss is r/t prior hospitalization in February. Of note, wt loss has been ongoing x ~ 1 year. Based on chart review, there is concern that pt has not had access to adequate food at times while at home.    Pt soft spoken and hesitant w/ answers to questions. She most likely still is not appropriate for DM diet ed. RD questions pt's ability to retain information.       Malnutrition Risk: Pt meets ASPEN criteria for moderate malnutrition in the context of chronic illness/environmental circumstances r/t decreased PO intake due to prior hospitalization and limited access to food at times at home AEB moderate muscle loss and wt loss of 31% x 1 year and 7.9% x 2.5 months.    Problem: Nutritional:  Goal: Achieve adequate nutritional intake  Description: Patient will consume >50% of meals and supplements  Outcome: progressing    RD following.

## 2023-09-26 NOTE — PROGRESS NOTES
4 Eyes Skin Assessment Completed by CARIE Schmidt and CARIE Garza.    Head Scab  Ears WDL  Nose WDL  Mouth WDL  Neck WDL  Breast/Chest WDL  Shoulder Blades WDL  Spine WDL  (R) Arm/Elbow/Hand WDL  (L) Arm/Elbow/Hand WDL  Abdomen WDL  Groin Swelling  Scrotum/Coccyx/Buttocks Redness and Blanching  (R) Leg WDL  (L) Leg WDL  (R) Heel/Foot/Toe WDL  (L) Heel/Foot/Toe Redness, Non-Blanching, and Boggy          Devices In Places Blood Pressure Cuff, Pulse Ox, Vivas, and SCD's      Interventions In Place Heel Mepilex, Waffle Overlay, TAP System, Pillows, and Barrier Cream    Possible Skin Injury Yes    Pictures Uploaded Into Epic Yes  Wound Consult Placed Yes  RN Wound Prevention Protocol Ordered Yes

## 2023-09-26 NOTE — CARE PLAN
The patient is Stable - Low risk of patient condition declining or worsening    Shift Goals  Clinical Goals: Manage pain, monitor blood glucose, monitor skin integrity  Patient Goals: No pain  Family Goals: IVA (no family at bedside)    Progress made toward(s) clinical / shift goals:  PRN medications and non pharmacologic interventions provided to manage pain. Blood glucose monitored and managed according to orders. Frequent skin checks, position changes, use of pillows, mepilex to help maintain skin integrity.    Patient is not progressing towards the following goals:

## 2023-09-26 NOTE — PROGRESS NOTES
4 Eyes Skin Assessment Completed by CARIE Govea and CARIE Love.    Head Redness  Ears Redness  Nose Redness  Mouth WDL  Neck WDL  Breast/Chest WDL  Shoulder Blades WDL  Spine WDL  (R) Arm/Elbow/Hand Edema  (L) Arm/Elbow/Hand Edema  Abdomen WDL  Groin Redness and Swelling  Scrotum/Coccyx/Buttocks Blanching  (R) Leg WDL  (L) Leg WDL  (R) Heel/Foot/Toe Redness and Edema  (L) Heel/Foot/Toe Redness and Edema          Devices In Places Pulse Ox, Vivas, and SCD's      Interventions In Place Heel Mepilex, Waffle Overlay, TAP System, Pillows, and Q2 Turns    Possible Skin Injury Yes    Pictures Uploaded Into Epic Yes  Wound Consult Placed Yes  RN Wound Prevention Protocol Ordered Yes

## 2023-09-26 NOTE — DISCHARGE PLANNING
Per RN CM, DPA sent [facility] referrals and a SNF referral to [facility].

## 2023-09-26 NOTE — PROGRESS NOTES
Received report fromXuan day shift nurse. Assumed pt care at 1915. Pt is resting comfortably in bed. Pt on RA. No signs of SOB/respiratory distress. Labs, VS, medications reviewed.  Fall precautions in place. Bed at lowest position. Call light and personal belongings within reach. Plan of care discussed, no further concerns at this time.

## 2023-09-27 ENCOUNTER — PATIENT OUTREACH (OUTPATIENT)
Dept: SCHEDULING | Facility: IMAGING CENTER | Age: 63
End: 2023-09-27
Payer: MEDICARE

## 2023-09-27 VITALS
DIASTOLIC BLOOD PRESSURE: 64 MMHG | RESPIRATION RATE: 18 BRPM | HEART RATE: 90 BPM | SYSTOLIC BLOOD PRESSURE: 119 MMHG | TEMPERATURE: 98.1 F | BODY MASS INDEX: 27.13 KG/M2 | OXYGEN SATURATION: 98 % | HEIGHT: 68 IN | WEIGHT: 179.01 LBS

## 2023-09-27 PROBLEM — D72.829 LEUKOCYTOSIS: Status: RESOLVED | Noted: 2023-09-23 | Resolved: 2023-09-27

## 2023-09-27 PROBLEM — N76.2 VULVAL CELLULITIS: Status: RESOLVED | Noted: 2023-09-23 | Resolved: 2023-09-27

## 2023-09-27 PROBLEM — E11.10 DKA, TYPE 2, NOT AT GOAL (HCC): Status: RESOLVED | Noted: 2023-02-03 | Resolved: 2023-09-27

## 2023-09-27 PROBLEM — E87.6 HYPOKALEMIA: Status: RESOLVED | Noted: 2023-09-26 | Resolved: 2023-09-27

## 2023-09-27 PROBLEM — N17.9 AKI (ACUTE KIDNEY INJURY) (HCC): Status: RESOLVED | Noted: 2023-02-03 | Resolved: 2023-09-27

## 2023-09-27 LAB
ANION GAP SERPL CALC-SCNC: 12 MMOL/L (ref 7–16)
BASOPHILS # BLD AUTO: 0 % (ref 0–1.8)
BASOPHILS # BLD: 0 K/UL (ref 0–0.12)
BUN SERPL-MCNC: 11 MG/DL (ref 8–22)
CALCIUM SERPL-MCNC: 9.2 MG/DL (ref 8.4–10.2)
CHLORIDE SERPL-SCNC: 106 MMOL/L (ref 96–112)
CO2 SERPL-SCNC: 22 MMOL/L (ref 20–33)
CREAT SERPL-MCNC: 0.55 MG/DL (ref 0.5–1.4)
EOSINOPHIL # BLD AUTO: 0.38 K/UL (ref 0–0.51)
EOSINOPHIL NFR BLD: 3 % (ref 0–6.9)
ERYTHROCYTE [DISTWIDTH] IN BLOOD BY AUTOMATED COUNT: 44.3 FL (ref 35.9–50)
GFR SERPLBLD CREATININE-BSD FMLA CKD-EPI: 104 ML/MIN/1.73 M 2
GLUCOSE BLD STRIP.AUTO-MCNC: 111 MG/DL (ref 65–99)
GLUCOSE SERPL-MCNC: 171 MG/DL (ref 65–99)
HCT VFR BLD AUTO: 35.6 % (ref 37–47)
HGB BLD-MCNC: 12.2 G/DL (ref 12–16)
LYMPHOCYTES # BLD AUTO: 2.29 K/UL (ref 1–4.8)
LYMPHOCYTES NFR BLD: 18 % (ref 22–41)
MAGNESIUM SERPL-MCNC: 1.3 MG/DL (ref 1.5–2.5)
MANUAL DIFF BLD: ABNORMAL
MCH RBC QN AUTO: 29.8 PG (ref 27–33)
MCHC RBC AUTO-ENTMCNC: 34.3 G/DL (ref 32.2–35.5)
MCV RBC AUTO: 86.8 FL (ref 81.4–97.8)
METAMYELOCYTES NFR BLD MANUAL: 1 %
MONOCYTES # BLD AUTO: 2.16 K/UL (ref 0–0.85)
MONOCYTES NFR BLD AUTO: 17 % (ref 0–13.4)
MYELOCYTES NFR BLD MANUAL: 3 %
NEUTROPHILS # BLD AUTO: 7.37 K/UL (ref 1.82–7.42)
NEUTROPHILS NFR BLD: 44 % (ref 44–72)
NEUTS BAND NFR BLD MANUAL: 14 % (ref 0–10)
NRBC # BLD AUTO: 0.02 K/UL
NRBC BLD-RTO: 0.2 /100 WBC (ref 0–0.2)
PHOSPHATE SERPL-MCNC: 1.9 MG/DL (ref 2.5–4.5)
PLATELET # BLD AUTO: 318 K/UL (ref 164–446)
PLATELET BLD QL SMEAR: NORMAL
PMV BLD AUTO: 10.4 FL (ref 9–12.9)
POTASSIUM SERPL-SCNC: 3.6 MMOL/L (ref 3.6–5.5)
RBC # BLD AUTO: 4.1 M/UL (ref 4.2–5.4)
RBC BLD AUTO: NORMAL
SODIUM SERPL-SCNC: 140 MMOL/L (ref 135–145)
WBC # BLD AUTO: 12.7 K/UL (ref 4.8–10.8)

## 2023-09-27 PROCEDURE — A9270 NON-COVERED ITEM OR SERVICE: HCPCS | Performed by: HOSPITALIST

## 2023-09-27 PROCEDURE — 82962 GLUCOSE BLOOD TEST: CPT

## 2023-09-27 PROCEDURE — 84100 ASSAY OF PHOSPHORUS: CPT

## 2023-09-27 PROCEDURE — A9270 NON-COVERED ITEM OR SERVICE: HCPCS | Performed by: INTERNAL MEDICINE

## 2023-09-27 PROCEDURE — 85025 COMPLETE CBC W/AUTO DIFF WBC: CPT

## 2023-09-27 PROCEDURE — 700111 HCHG RX REV CODE 636 W/ 250 OVERRIDE (IP): Mod: JZ | Performed by: HOSPITALIST

## 2023-09-27 PROCEDURE — 700102 HCHG RX REV CODE 250 W/ 637 OVERRIDE(OP): Performed by: HOSPITALIST

## 2023-09-27 PROCEDURE — 700102 HCHG RX REV CODE 250 W/ 637 OVERRIDE(OP): Performed by: INTERNAL MEDICINE

## 2023-09-27 PROCEDURE — 36415 COLL VENOUS BLD VENIPUNCTURE: CPT

## 2023-09-27 PROCEDURE — 80048 BASIC METABOLIC PNL TOTAL CA: CPT

## 2023-09-27 PROCEDURE — 700101 HCHG RX REV CODE 250: Performed by: HOSPITALIST

## 2023-09-27 PROCEDURE — 85007 BL SMEAR W/DIFF WBC COUNT: CPT

## 2023-09-27 PROCEDURE — 83735 ASSAY OF MAGNESIUM: CPT

## 2023-09-27 PROCEDURE — 94760 N-INVAS EAR/PLS OXIMETRY 1: CPT

## 2023-09-27 PROCEDURE — 99239 HOSP IP/OBS DSCHRG MGMT >30: CPT | Performed by: HOSPITALIST

## 2023-09-27 RX ORDER — LANOLIN ALCOHOL/MO/W.PET/CERES
400 CREAM (GRAM) TOPICAL DAILY
Status: SHIPPED
Start: 2023-09-27 | End: 2023-10-04

## 2023-09-27 RX ORDER — MAGNESIUM SULFATE HEPTAHYDRATE 40 MG/ML
2 INJECTION, SOLUTION INTRAVENOUS ONCE
Status: DISCONTINUED | OUTPATIENT
Start: 2023-09-27 | End: 2023-09-27

## 2023-09-27 RX ORDER — POTASSIUM CHLORIDE 20 MEQ/1
20 TABLET, EXTENDED RELEASE ORAL DAILY
Status: SHIPPED
Start: 2023-09-27 | End: 2023-10-04

## 2023-09-27 RX ORDER — LANOLIN ALCOHOL/MO/W.PET/CERES
400 CREAM (GRAM) TOPICAL ONCE
Status: DISCONTINUED | OUTPATIENT
Start: 2023-09-27 | End: 2023-09-27 | Stop reason: HOSPADM

## 2023-09-27 RX ADMIN — POTASSIUM PHOSPHATE, MONOBASIC AND POTASSIUM PHOSPHATE, DIBASIC 30 MMOL: 224; 236 INJECTION, SOLUTION, CONCENTRATE INTRAVENOUS at 10:02

## 2023-09-27 RX ADMIN — POTASSIUM CHLORIDE 20 MEQ: 1500 TABLET, EXTENDED RELEASE ORAL at 06:33

## 2023-09-27 RX ADMIN — SENNOSIDES AND DOCUSATE SODIUM 2 TABLET: 50; 8.6 TABLET ORAL at 06:34

## 2023-09-27 RX ADMIN — POLYETHYLENE GLYCOL 3350 1 PACKET: 17 POWDER, FOR SOLUTION ORAL at 06:33

## 2023-09-27 RX ADMIN — POTASSIUM CHLORIDE 20 MEQ: 1500 TABLET, EXTENDED RELEASE ORAL at 11:42

## 2023-09-27 RX ADMIN — AMOXICILLIN AND CLAVULANATE POTASSIUM 1 TABLET: 875; 125 TABLET, FILM COATED ORAL at 06:34

## 2023-09-27 RX ADMIN — OXYCODONE HYDROCHLORIDE 5 MG: 5 TABLET ORAL at 06:33

## 2023-09-27 RX ADMIN — MAGNESIUM SULFATE HEPTAHYDRATE 2 G: 2 INJECTION, SOLUTION INTRAVENOUS at 09:40

## 2023-09-27 ASSESSMENT — PAIN DESCRIPTION - PAIN TYPE
TYPE: ACUTE PAIN

## 2023-09-27 NOTE — PROGRESS NOTES
4 Eyes Skin Assessment Completed by CARIE Oneal and CARIE Sarabia.    Head WDL  Ears WDL  Nose WDL  Mouth WDL  Neck WDL  Breast/Chest WDL  Shoulder Blades WDL  Spine WDL  (R) Arm/Elbow/Hand WDL  (L) Arm/Elbow/Hand WDL  Abdomen WDL  Groin Redness, Rash, and Swelling  Scrotum/Coccyx/Buttocks WDL  (R) Leg WDL  (L) Leg WDL  (R) Heel/Foot/Toe WDL  (L) Heel/Foot/Toe Redness and Boggy          Devices In Places Tele Box, Pulse Ox, Vivas, and SCD's      Interventions In Place Heel Mepilex, TAP System, Pillows, Q2 Turns, and Heels Loaded W/Pillows    Possible Skin Injury Yes    Pictures Uploaded Into Epic Yes  Wound Consult Placed Yes  RN Wound Prevention Protocol Ordered Yes

## 2023-09-27 NOTE — ASSESSMENT & PLAN NOTE
I ordered replacement potassium 3 times daily  I ordered repeat potassium levels to assess her response to treatment

## 2023-09-27 NOTE — DISCHARGE SUMMARY
Discharge Summary    CHIEF COMPLAINT ON ADMISSION  Abdominal pain    Reason for Admission  DKA     Admission Date  9/23/2023    CODE STATUS  Full Code    HPI & HOSPITAL COURSE  This is a 63 y.o. female here with abdominal pain     Fannie Driver is a 63-year-old female who was transferred from [facility] secondary to DKA.  She had a glucose of 708 a bicarb of 5 and an anion gap greater than 30 at time of transfer.  COVID and flu were negative and she was initially started on Rocephin and transferred for further management.  Patient has history that includes prior DVT, she is not on anticoagulation.  .  Her anion gap closed on 9/24 and she was transition out of the intensive care unit to the medical floor.  She was also diagnosed with vulvar cellulitis and treated with Unasyn/Augmentin.    Patient would benefit from ongoing physical therapy and occupational therapy as well as medical care at the skilled nursing level.  She will be transferred to SNF today.    With regards to her diabetes, would recommend continuation of Lantus 30 units twice daily with insulin sliding scale.    Patient has had hypokalemia as well as hypomagnesemia during her hospital stay.  Recommend continuing potassium and magnesium supplements for the next 7 days    She has completed treatment for vulvar cellulitis.    Therefore, she is discharged in fair and stable condition to skilled nursing facility.    The patient met 2-midnight criteria for an inpatient stay at the time of discharge.    Discharge Date  9/27/2023    FOLLOW UP ITEMS POST DISCHARGE  To be determined at skilled nursing facility    DISCHARGE DIAGNOSES  Principal Problem (Resolved):    DKA, type 2, not at goal (HCC) (POA: Yes)  Active Problems:    Generalized weakness (POA: Yes)    History of DVT (deep vein thrombosis) (POA: Yes)  Resolved Problems:    Vulval cellulitis (POA: Yes)    Hypokalemia (POA: Yes)    MAICOL (acute kidney injury) (HCC) (POA: Yes)    Leukocytosis (POA:  Yes)      FOLLOW UP  No future appointments.  [facility] [address] [address]  [phone]          MEDICATIONS ON DISCHARGE     Medication List        START taking these medications        Instructions   insulin GLARGINE 100 UNIT/ML Soln  Commonly known as: Lantus,Semglee   Inject 30 Units under the skin 2 times a day.  Dose: 30 Units     insulin regular 100 Unit/mL Soln  Commonly known as: Humulin R   Inject 2-9 Units under the skin 4 Times a Day,Before Meals and at Bedtime.  Dose: 2-9 Units     magnesium oxide 400 (240 Mg) MG Tabs   Take 1 Tablet by mouth every day for 7 days.  Dose: 400 mg     potassium chloride SA 20 MEQ Tbcr  Commonly known as: Kdur   Take 1 Tablet by mouth every day for 7 days.  Dose: 20 mEq     rivaroxaban 10 MG Tabs tablet  Commonly known as: Xarelto   Take 1 Tablet by mouth every day at 6 PM.  Dose: 10 mg              Allergies  No Known Allergies    DIET  Orders Placed This Encounter   Procedures    Diet Order Diet: Consistent CHO (Diabetic)     Standing Status:   Standing     Number of Occurrences:   1     Order Specific Question:   Diet:     Answer:   Consistent CHO (Diabetic) [4]       ACTIVITY  As tolerated.  Weight bearing as tolerated    CONSULTATIONS  Critical care    PROCEDURES  Chest x-ray 9/23/2023:  1.  No acute cardiopulmonary disease.  2.  Peculiar curvilinear lucency and soft tissue shadows project over the left hemithorax. This is not thought to represent pneumothorax. If there is clinical concern based on physical exam, repeat chest x-ray could be obtained.    LABORATORY  Lab Results   Component Value Date    SODIUM 140 09/27/2023    POTASSIUM 3.6 09/27/2023    CHLORIDE 106 09/27/2023    CO2 22 09/27/2023    GLUCOSE 171 (H) 09/27/2023    BUN 11 09/27/2023    CREATININE 0.55 09/27/2023        Lab Results   Component Value Date    WBC 12.7 (H) 09/27/2023    HEMOGLOBIN 12.2 09/27/2023    HEMATOCRIT 35.6 (L) 09/27/2023     [facility]        Total time of the discharge process exceeds 45 minutes.

## 2023-09-27 NOTE — CARE PLAN
The patient is Stable - Low risk of patient condition declining or worsening    Shift Goals  Clinical Goals: fsbg monitoring; pain control  Patient Goals: pain control  Family Goals: IVA    Progress made toward(s) clinical / shift goals:    Problem: Skin Integrity  Goal: Skin integrity is maintained or improved  Outcome: Progressing     Problem: Fall Risk  Goal: Patient will remain free from falls  Outcome: Progressing     Problem: Psychosocial  Goal: Patient's ability to verbalize feelings about condition will improve  Outcome: Progressing     Problem: Hemodynamics  Goal: Patient's hemodynamics, fluid balance and neurologic status will be stable or improve  Outcome: Progressing       Patient is not progressing towards the following goals:      Problem: Knowledge Deficit - Standard  Goal: Patient and family/care givers will demonstrate understanding of plan of care, disease process/condition, diagnostic tests and medications  Outcome: Not Progressing     Problem: Psychosocial  Goal: Patient's level of anxiety will decrease  Outcome: Not Progressing  Goal: Patient's ability to re-evaluate and adapt role responsibilities will improve  Outcome: Not Progressing  Goal: Patient and family will demonstrate ability to cope with life altering diagnosis and/or procedure  Outcome: Not Progressing

## 2023-09-27 NOTE — DISCHARGE PLANNING
DC Transport Scheduled    Received request at: 1027 9/27/2023    Transport Company Scheduled:  [employer]  Spoke with clifford at [facility] to schedule transport.    Scheduled Date: 9/27/2023  Scheduled Time: 1115    Destination: [address]    Notified care team of scheduled transport via [employer].     If there are any changes needed to the DC transportation scheduled, please contact [facility] at ext. [phone] between the hours of 5756-1514 Mon-Fri. If outside those hours, contact the ED Case Manager at ext. [phone].

## 2023-09-27 NOTE — PROGRESS NOTES
Hospital Medicine Daily Progress Note    Date of Service  9/26/2023    Chief Complaint  Fannie Driver is a 63 y.o. female admitted 9/23/2023 with diabetic ketoacidosis    Hospital Course  Patient is a 63-year-old female who was transferred from [facility] secondary to DKA.  She had a glucose of 708 a bicarb of 5 and an anion gap greater than 30 at time of transfer.  COVID and flu were negative and she was initially started on Rocephin and transferred for further management.  History of methamphetamine abuse history of DVT without anticoagulation.  Patient currently denies any type of drug use.  Patient found to have vulvar cellulitis and antibiotics changed to Unasyn.  Her anion gap closed on 9/24 and she was transition out of the intensive care unit to the medical floor.    Interval Problem Update  Patient complains that she is tired  Too weak to get up  No nausea or vomiting    I have discussed this patient's plan of care and discharge plan at IDT rounds today with Case Management, Nursing, Nursing leadership, and other members of the IDT team.    Consultants/Specialty  critical care    Code Status  Full Code    Disposition  The patient is not medically cleared for discharge to home or a post-acute facility.  Anticipate discharge to: skilled nursing facility    I have placed the appropriate orders for post-discharge needs.    Review of Systems  Review of Systems   Constitutional:  Negative for chills and malaise/fatigue.   Respiratory:  Negative for cough, hemoptysis and sputum production.    Cardiovascular:  Negative for chest pain, palpitations and orthopnea.   Gastrointestinal:  Negative for nausea and vomiting.   Skin:  Negative for itching and rash.   Neurological:  Positive for weakness. Negative for dizziness.   All other systems reviewed and are negative.       Physical Exam  Temp:  [36.6 °C (97.8 °F)-36.9 °C (98.4 °F)] 36.8 °C (98.2 °F)  Pulse:  [64-95] 90  Resp:  [16-18] 16  BP: (100-115)/(50-63)  106/58  SpO2:  [93 %-96 %] 93 %    Physical Exam  Constitutional:       General: She is not in acute distress.     Appearance: She is normal weight. She is ill-appearing.   HENT:      Head: Normocephalic and atraumatic.      Right Ear: External ear normal.      Left Ear: External ear normal.      Nose: Nose normal.      Mouth/Throat:      Mouth: Mucous membranes are moist.      Pharynx: Oropharynx is clear.   Eyes:      Extraocular Movements: Extraocular movements intact.      Conjunctiva/sclera: Conjunctivae normal.      Pupils: Pupils are equal, round, and reactive to light.   Cardiovascular:      Rate and Rhythm: Normal rate and regular rhythm.      Pulses: Normal pulses.   Pulmonary:      Effort: Pulmonary effort is normal.      Breath sounds: Normal breath sounds.   Abdominal:      General: Abdomen is flat. Bowel sounds are normal. There is no distension.      Palpations: Abdomen is soft.      Tenderness: There is no abdominal tenderness.   Musculoskeletal:         General: Normal range of motion.      Cervical back: Normal range of motion and neck supple.   Skin:     General: Skin is warm and dry.      Capillary Refill: Capillary refill takes less than 2 seconds.      Coloration: Skin is not jaundiced.   Neurological:      General: No focal deficit present.      Cranial Nerves: No cranial nerve deficit.      Gait: Gait normal.      Comments: lethargic         Fluids    Intake/Output Summary (Last 24 hours) at 9/26/2023 1728  Last data filed at 9/26/2023 0552  Gross per 24 hour   Intake 647.96 ml   Output 850 ml   Net -202.04 ml       Laboratory  Recent Labs     09/24/23  0400 09/25/23  0241 09/26/23  0118   WBC 12.6* 9.4 10.6   RBC 4.18* 4.49 4.39   HEMOGLOBIN 12.6 13.3 13.0   HEMATOCRIT 35.9* 38.9 38.5   MCV 85.9 86.6 87.7   MCH 30.1 29.6 29.6   MCHC 35.1 34.2 33.8   RDW 41.3 43.7 45.3   PLATELETCT 278 267 244   MPV 10.2 10.5 10.6     Recent Labs     09/24/23  1636 09/25/23  0241 09/26/23  0118   SODIUM 134*  136 138   POTASSIUM 3.8 3.4* 3.1*   CHLORIDE 106 108 107   CO2 15* 16* 21   GLUCOSE 220* 229* 197*   BUN 14 13 11   CREATININE 0.64 0.58 0.56   CALCIUM 9.0 9.2 9.5                   Imaging  DX-CHEST-PORTABLE (1 VIEW)   Final Result      1.  No acute cardiopulmonary disease.   2.  Peculiar curvilinear lucency and soft tissue shadows project over the left hemithorax. This is not thought to represent pneumothorax. If there is clinical concern based on physical exam, repeat chest x-ray could be obtained.           Assessment/Plan  * DKA, type 2, not at goal (HCC)- (present on admission)  Assessment & Plan  Diabetes is uncontrolled with hyperglycemia diabetes is uncontrolled hyperglycemia, her hemoglobin A1c is 13.7, blood sugars over the last 24 hours have ranged from 190s to 290s  Increase Lantus  Continue nutritional and sliding scale insulin    Vulval cellulitis- (present on admission)  Assessment & Plan  Complete course of antibiotics with Augmentin    Generalized weakness- (present on admission)  Assessment & Plan  Evaluate for skilled nursing facility placement, the patient is too weak to get up at this point    Leukocytosis- (present on admission)  Assessment & Plan  Leukocytosis has resolved    History of DVT (deep vein thrombosis)- (present on admission)  Assessment & Plan  July, 2022. Not currently on anticoagulation  Continue DVT prophylaxis    MAICOL (acute kidney injury) ([facility])- (present on admission)  Assessment & Plan  This morning's labs reviewed, acute kidney injury has resolved         VTE prophylaxis:    Xarelto 10mg daily as prophylaxis      I have performed a physical exam and reviewed and updated ROS and Plan today (9/26/2023). In review of yesterday's note (9/25/2023), there are no changes except as documented above.

## 2023-09-27 NOTE — DISCHARGE PLANNING
Case Management Discharge Planning    Admission Date: 9/23/2023  GMLOS: 2.9  ALOS: 4    6-Clicks ADL Score: 13  6-Clicks Mobility Score: 6  PT and/or OT Eval ordered: Yes  Post-acute Referrals Ordered: Yes  Post-acute Choice Obtained: Yes  Has referral(s) been sent to post-acute provider:  Yes    Anticipated Discharge Dispo: Discharge Disposition: D/T to SNF with Medicare cert in anticipation of skilled care (03)    DME Needed: No    Action(s) Taken: Updated Provider/Nurse on Discharge Plan  Patient discussed with team during IDT rounds. Patient is medically cleared for discharge at this time. KIRAN met and spoke with patient at bedside. Kiran informed patient of all accepting facilities in the local area and in [place]. Patient stated she wanted to be closer to home and her son in [place], [place]. Patient stated choice for [facility] in [place]. KIRAN received new phone number for son, Les [phone]. Kiran called and spoke to Les, KIRAN informed Les of transfer to [facility]. No other concerns at this time. KIRAN attemtped to call [facility] to inform of transfer time, no answer for admissions, KIRAN left message. No other needs reported at this time.     KIRAN called and spoke to Lucas with [facility]. Informed Lucas patient is on the way and requested for nurse to call Les when patient arrive. No other needs reported at this time    Escalations Completed: None    Medically Clear: Yes    Next Steps: [facility]  scheduled for 1115    Barriers to Discharge: None

## 2023-09-27 NOTE — CARE PLAN
The patient is Stable - Low risk of patient condition declining or worsening    Shift Goals  Clinical Goals: MONITOR BLOOD GLUCOSE LEVEL  Patient Goals: Be pain free  Family Goals: IVA (no family at bedside)    Progress made toward(s) clinical / shift goals:        Patient is not progressing towards the following goals:      Problem: Psychosocial  Goal: Patient's level of anxiety will decrease  Outcome: Not Progressing  Goal: Patient's ability to re-evaluate and adapt role responsibilities will improve  Outcome: Not Progressing  Goal: Patient and family will demonstrate ability to cope with life altering diagnosis and/or procedure  Outcome: Not Progressing

## 2023-09-27 NOTE — PROGRESS NOTES
Vivas removed at 1115 and changed peripad.  Packed up belongings and pt wanted her cellphone in her pocket.  Pt's home meds were in her personal bag at time of transfer.  Report given to Karin at [facility].  Pt transferred to [facility] with forrest with waffle cushion.   Report given to paramedic and pt left via ambulance.

## 2023-09-28 LAB
BACTERIA BLD CULT: NORMAL
BACTERIA BLD CULT: NORMAL
SIGNIFICANT IND 70042: NORMAL
SIGNIFICANT IND 70042: NORMAL
SITE SITE: NORMAL
SITE SITE: NORMAL
SOURCE SOURCE: NORMAL
SOURCE SOURCE: NORMAL

## 2023-10-10 ENCOUNTER — HOSPITAL ENCOUNTER (INPATIENT)
Facility: MEDICAL CENTER | Age: 63
LOS: 7 days | DRG: 264 | End: 2023-10-17
Attending: STUDENT IN AN ORGANIZED HEALTH CARE EDUCATION/TRAINING PROGRAM | Admitting: STUDENT IN AN ORGANIZED HEALTH CARE EDUCATION/TRAINING PROGRAM
Payer: MEDICARE

## 2023-10-10 ENCOUNTER — ANESTHESIA EVENT (OUTPATIENT)
Dept: SURGERY | Facility: MEDICAL CENTER | Age: 63
DRG: 264 | End: 2023-10-10
Payer: MEDICARE

## 2023-10-10 ENCOUNTER — ANESTHESIA (OUTPATIENT)
Dept: SURGERY | Facility: MEDICAL CENTER | Age: 63
DRG: 264 | End: 2023-10-10
Payer: MEDICARE

## 2023-10-10 ENCOUNTER — HOSPITAL ENCOUNTER (OUTPATIENT)
Dept: RADIOLOGY | Facility: MEDICAL CENTER | Age: 63
End: 2023-10-10

## 2023-10-10 DIAGNOSIS — I96 GANGRENE (HCC): ICD-10-CM

## 2023-10-10 DIAGNOSIS — E11.10 DKA, TYPE 2, NOT AT GOAL (HCC): ICD-10-CM

## 2023-10-10 PROBLEM — M79.89 NECROTIZING SOFT TISSUE INFECTION: Status: ACTIVE | Noted: 2023-10-10

## 2023-10-10 PROBLEM — E11.9 DIABETES MELLITUS (HCC): Status: ACTIVE | Noted: 2023-02-03

## 2023-10-10 LAB
ABO + RH BLD: NORMAL
ABO GROUP BLD: NORMAL
ALBUMIN SERPL BCP-MCNC: 2.1 G/DL (ref 3.2–4.9)
ALBUMIN/GLOB SERPL: 0.4 G/DL
ALP SERPL-CCNC: 165 U/L (ref 30–99)
ALT SERPL-CCNC: 11 U/L (ref 2–50)
ANION GAP SERPL CALC-SCNC: 11 MMOL/L (ref 7–16)
AST SERPL-CCNC: 21 U/L (ref 12–45)
BASOPHILS # BLD AUTO: 0.7 % (ref 0–1.8)
BASOPHILS # BLD: 0.08 K/UL (ref 0–0.12)
BILIRUB SERPL-MCNC: 0.5 MG/DL (ref 0.1–1.5)
BLD GP AB SCN SERPL QL: NORMAL
BUN SERPL-MCNC: 9 MG/DL (ref 8–22)
CALCIUM ALBUM COR SERPL-MCNC: 9.8 MG/DL (ref 8.5–10.5)
CALCIUM SERPL-MCNC: 8.3 MG/DL (ref 8.5–10.5)
CHLORIDE SERPL-SCNC: 102 MMOL/L (ref 96–112)
CO2 SERPL-SCNC: 23 MMOL/L (ref 20–33)
CREAT SERPL-MCNC: 0.68 MG/DL (ref 0.5–1.4)
EOSINOPHIL # BLD AUTO: 0.14 K/UL (ref 0–0.51)
EOSINOPHIL NFR BLD: 1.3 % (ref 0–6.9)
ERYTHROCYTE [DISTWIDTH] IN BLOOD BY AUTOMATED COUNT: 42.5 FL (ref 35.9–50)
EST. AVERAGE GLUCOSE BLD GHB EST-MCNC: 338 MG/DL
GFR SERPLBLD CREATININE-BSD FMLA CKD-EPI: 98 ML/MIN/1.73 M 2
GLOBULIN SER CALC-MCNC: 4.7 G/DL (ref 1.9–3.5)
GLUCOSE SERPL-MCNC: 100 MG/DL (ref 65–99)
HBA1C MFR BLD: 13.4 % (ref 4–5.6)
HCT VFR BLD AUTO: 35.7 % (ref 37–47)
HGB BLD-MCNC: 11.4 G/DL (ref 12–16)
IMM GRANULOCYTES # BLD AUTO: 0.07 K/UL (ref 0–0.11)
IMM GRANULOCYTES NFR BLD AUTO: 0.6 % (ref 0–0.9)
INR PPP: 1.53 (ref 0.87–1.13)
LACTATE SERPL-SCNC: 1.4 MMOL/L (ref 0.5–2)
LYMPHOCYTES # BLD AUTO: 2.12 K/UL (ref 1–4.8)
LYMPHOCYTES NFR BLD: 19.5 % (ref 22–41)
MAGNESIUM SERPL-MCNC: 1.5 MG/DL (ref 1.5–2.5)
MCH RBC QN AUTO: 28.9 PG (ref 27–33)
MCHC RBC AUTO-ENTMCNC: 31.9 G/DL (ref 32.2–35.5)
MCV RBC AUTO: 90.6 FL (ref 81.4–97.8)
MONOCYTES # BLD AUTO: 1.21 K/UL (ref 0–0.85)
MONOCYTES NFR BLD AUTO: 11.1 % (ref 0–13.4)
NEUTROPHILS # BLD AUTO: 7.27 K/UL (ref 1.82–7.42)
NEUTROPHILS NFR BLD: 66.8 % (ref 44–72)
NRBC # BLD AUTO: 0 K/UL
NRBC BLD-RTO: 0 /100 WBC (ref 0–0.2)
PHOSPHATE SERPL-MCNC: 3.8 MG/DL (ref 2.5–4.5)
PLATELET # BLD AUTO: 698 K/UL (ref 164–446)
PMV BLD AUTO: 8.9 FL (ref 9–12.9)
POTASSIUM SERPL-SCNC: 3.7 MMOL/L (ref 3.6–5.5)
PROT SERPL-MCNC: 6.8 G/DL (ref 6–8.2)
PROTHROMBIN TIME: 18.6 SEC (ref 12–14.6)
RBC # BLD AUTO: 3.94 M/UL (ref 4.2–5.4)
RH BLD: NORMAL
SODIUM SERPL-SCNC: 136 MMOL/L (ref 135–145)
WBC # BLD AUTO: 10.9 K/UL (ref 4.8–10.8)

## 2023-10-10 PROCEDURE — 87070 CULTURE OTHR SPECIMN AEROBIC: CPT

## 2023-10-10 PROCEDURE — 87102 FUNGUS ISOLATION CULTURE: CPT

## 2023-10-10 PROCEDURE — 99223 1ST HOSP IP/OBS HIGH 75: CPT | Mod: AI | Performed by: STUDENT IN AN ORGANIZED HEALTH CARE EDUCATION/TRAINING PROGRAM

## 2023-10-10 PROCEDURE — 770020 HCHG ROOM/CARE - TELE (206)

## 2023-10-10 PROCEDURE — 86900 BLOOD TYPING SEROLOGIC ABO: CPT

## 2023-10-10 PROCEDURE — 36415 COLL VENOUS BLD VENIPUNCTURE: CPT

## 2023-10-10 PROCEDURE — 86850 RBC ANTIBODY SCREEN: CPT

## 2023-10-10 PROCEDURE — 87075 CULTR BACTERIA EXCEPT BLOOD: CPT

## 2023-10-10 PROCEDURE — 83605 ASSAY OF LACTIC ACID: CPT

## 2023-10-10 PROCEDURE — 87205 SMEAR GRAM STAIN: CPT | Mod: 91

## 2023-10-10 PROCEDURE — 700101 HCHG RX REV CODE 250: Performed by: ANESTHESIOLOGY

## 2023-10-10 PROCEDURE — 160036 HCHG PACU - EA ADDL 30 MINS PHASE I: Performed by: SURGERY

## 2023-10-10 PROCEDURE — 85610 PROTHROMBIN TIME: CPT

## 2023-10-10 PROCEDURE — 0JB80ZZ EXCISION OF ABDOMEN SUBCUTANEOUS TISSUE AND FASCIA, OPEN APPROACH: ICD-10-PCS | Performed by: SURGERY

## 2023-10-10 PROCEDURE — 160027 HCHG SURGERY MINUTES - 1ST 30 MINS LEVEL 2: Performed by: SURGERY

## 2023-10-10 PROCEDURE — 160038 HCHG SURGERY MINUTES - EA ADDL 1 MIN LEVEL 2: Performed by: SURGERY

## 2023-10-10 PROCEDURE — 160002 HCHG RECOVERY MINUTES (STAT): Performed by: SURGERY

## 2023-10-10 PROCEDURE — 84100 ASSAY OF PHOSPHORUS: CPT

## 2023-10-10 PROCEDURE — 83036 HEMOGLOBIN GLYCOSYLATED A1C: CPT

## 2023-10-10 PROCEDURE — 86901 BLOOD TYPING SEROLOGIC RH(D): CPT

## 2023-10-10 PROCEDURE — 85025 COMPLETE CBC W/AUTO DIFF WBC: CPT

## 2023-10-10 PROCEDURE — 700111 HCHG RX REV CODE 636 W/ 250 OVERRIDE (IP): Mod: JZ | Performed by: ANESTHESIOLOGY

## 2023-10-10 PROCEDURE — 87076 CULTURE ANAEROBE IDENT EACH: CPT

## 2023-10-10 PROCEDURE — 83735 ASSAY OF MAGNESIUM: CPT

## 2023-10-10 PROCEDURE — 80053 COMPREHEN METABOLIC PANEL: CPT

## 2023-10-10 PROCEDURE — 0JBC0ZZ EXCISION OF PELVIC REGION SUBCUTANEOUS TISSUE AND FASCIA, OPEN APPROACH: ICD-10-PCS | Performed by: SURGERY

## 2023-10-10 PROCEDURE — 700105 HCHG RX REV CODE 258: Performed by: ANESTHESIOLOGY

## 2023-10-10 PROCEDURE — 99221 1ST HOSP IP/OBS SF/LOW 40: CPT | Mod: 25 | Performed by: SURGERY

## 2023-10-10 PROCEDURE — 160035 HCHG PACU - 1ST 60 MINS PHASE I: Performed by: SURGERY

## 2023-10-10 PROCEDURE — 11006 DBRDMT SKIN XTRNL GENT PER: CPT | Performed by: SURGERY

## 2023-10-10 PROCEDURE — 87186 SC STD MICRODIL/AGAR DIL: CPT

## 2023-10-10 PROCEDURE — 160048 HCHG OR STATISTICAL LEVEL 1-5: Performed by: SURGERY

## 2023-10-10 PROCEDURE — 160009 HCHG ANES TIME/MIN: Performed by: SURGERY

## 2023-10-10 PROCEDURE — 700111 HCHG RX REV CODE 636 W/ 250 OVERRIDE (IP): Mod: JZ,JG | Performed by: SURGERY

## 2023-10-10 RX ORDER — ONDANSETRON 2 MG/ML
INJECTION INTRAMUSCULAR; INTRAVENOUS PRN
Status: DISCONTINUED | OUTPATIENT
Start: 2023-10-10 | End: 2023-10-10 | Stop reason: SURG

## 2023-10-10 RX ORDER — HALOPERIDOL 5 MG/ML
1 INJECTION INTRAMUSCULAR
Status: DISCONTINUED | OUTPATIENT
Start: 2023-10-10 | End: 2023-10-11 | Stop reason: HOSPADM

## 2023-10-10 RX ORDER — MIDAZOLAM HYDROCHLORIDE 1 MG/ML
1 INJECTION INTRAMUSCULAR; INTRAVENOUS
Status: DISCONTINUED | OUTPATIENT
Start: 2023-10-10 | End: 2023-10-11 | Stop reason: HOSPADM

## 2023-10-10 RX ORDER — HYDRALAZINE HYDROCHLORIDE 20 MG/ML
5 INJECTION INTRAMUSCULAR; INTRAVENOUS
Status: DISCONTINUED | OUTPATIENT
Start: 2023-10-10 | End: 2023-10-11 | Stop reason: HOSPADM

## 2023-10-10 RX ORDER — ONDANSETRON 2 MG/ML
4 INJECTION INTRAMUSCULAR; INTRAVENOUS EVERY 4 HOURS PRN
Status: ACTIVE | OUTPATIENT
Start: 2023-10-10 | End: 2023-10-10

## 2023-10-10 RX ORDER — EPHEDRINE SULFATE 50 MG/ML
5 INJECTION, SOLUTION INTRAVENOUS
Status: DISCONTINUED | OUTPATIENT
Start: 2023-10-10 | End: 2023-10-11 | Stop reason: HOSPADM

## 2023-10-10 RX ORDER — BISACODYL 10 MG
10 SUPPOSITORY, RECTAL RECTAL
Status: DISCONTINUED | OUTPATIENT
Start: 2023-10-10 | End: 2023-10-17 | Stop reason: HOSPADM

## 2023-10-10 RX ORDER — ACETAMINOPHEN 325 MG/1
650 TABLET ORAL EVERY 6 HOURS PRN
Status: ACTIVE | OUTPATIENT
Start: 2023-10-10 | End: 2023-10-10

## 2023-10-10 RX ORDER — DIPHENHYDRAMINE HYDROCHLORIDE 50 MG/ML
12.5 INJECTION INTRAMUSCULAR; INTRAVENOUS
Status: DISCONTINUED | OUTPATIENT
Start: 2023-10-10 | End: 2023-10-11 | Stop reason: HOSPADM

## 2023-10-10 RX ORDER — KETOROLAC TROMETHAMINE 30 MG/ML
INJECTION, SOLUTION INTRAMUSCULAR; INTRAVENOUS PRN
Status: DISCONTINUED | OUTPATIENT
Start: 2023-10-10 | End: 2023-10-10 | Stop reason: SURG

## 2023-10-10 RX ORDER — OXYCODONE HCL 5 MG/5 ML
10 SOLUTION, ORAL ORAL
Status: COMPLETED | OUTPATIENT
Start: 2023-10-10 | End: 2023-10-11

## 2023-10-10 RX ORDER — SODIUM CHLORIDE, SODIUM LACTATE, POTASSIUM CHLORIDE, CALCIUM CHLORIDE 600; 310; 30; 20 MG/100ML; MG/100ML; MG/100ML; MG/100ML
INJECTION, SOLUTION INTRAVENOUS CONTINUOUS
Status: DISCONTINUED | OUTPATIENT
Start: 2023-10-11 | End: 2023-10-11 | Stop reason: HOSPADM

## 2023-10-10 RX ORDER — CEFOTETAN DISODIUM 2 G/20ML
INJECTION, POWDER, FOR SOLUTION INTRAMUSCULAR; INTRAVENOUS PRN
Status: DISCONTINUED | OUTPATIENT
Start: 2023-10-10 | End: 2023-10-10 | Stop reason: SURG

## 2023-10-10 RX ORDER — SODIUM CHLORIDE, SODIUM LACTATE, POTASSIUM CHLORIDE, CALCIUM CHLORIDE 600; 310; 30; 20 MG/100ML; MG/100ML; MG/100ML; MG/100ML
INJECTION, SOLUTION INTRAVENOUS
Status: DISCONTINUED | OUTPATIENT
Start: 2023-10-10 | End: 2023-10-10 | Stop reason: SURG

## 2023-10-10 RX ORDER — AMOXICILLIN 250 MG
2 CAPSULE ORAL 2 TIMES DAILY
Status: DISCONTINUED | OUTPATIENT
Start: 2023-10-10 | End: 2023-10-17 | Stop reason: HOSPADM

## 2023-10-10 RX ORDER — OXYCODONE HCL 5 MG/5 ML
5 SOLUTION, ORAL ORAL
Status: COMPLETED | OUTPATIENT
Start: 2023-10-10 | End: 2023-10-11

## 2023-10-10 RX ORDER — MIDAZOLAM HYDROCHLORIDE 1 MG/ML
INJECTION INTRAMUSCULAR; INTRAVENOUS PRN
Status: DISCONTINUED | OUTPATIENT
Start: 2023-10-10 | End: 2023-10-10 | Stop reason: SURG

## 2023-10-10 RX ORDER — DEXAMETHASONE SODIUM PHOSPHATE 4 MG/ML
INJECTION, SOLUTION INTRA-ARTICULAR; INTRALESIONAL; INTRAMUSCULAR; INTRAVENOUS; SOFT TISSUE PRN
Status: DISCONTINUED | OUTPATIENT
Start: 2023-10-10 | End: 2023-10-10 | Stop reason: SURG

## 2023-10-10 RX ORDER — DEXTROSE MONOHYDRATE 25 G/50ML
25 INJECTION, SOLUTION INTRAVENOUS
Status: DISCONTINUED | OUTPATIENT
Start: 2023-10-10 | End: 2023-10-14

## 2023-10-10 RX ORDER — CLINDAMYCIN PHOSPHATE 600 MG/50ML
600 INJECTION, SOLUTION INTRAVENOUS EVERY 8 HOURS
Status: DISCONTINUED | OUTPATIENT
Start: 2023-10-10 | End: 2023-10-11

## 2023-10-10 RX ORDER — SODIUM CHLORIDE, SODIUM LACTATE, POTASSIUM CHLORIDE, CALCIUM CHLORIDE 600; 310; 30; 20 MG/100ML; MG/100ML; MG/100ML; MG/100ML
INJECTION, SOLUTION INTRAVENOUS CONTINUOUS
Status: DISCONTINUED | OUTPATIENT
Start: 2023-10-10 | End: 2023-10-15

## 2023-10-10 RX ORDER — LABETALOL HYDROCHLORIDE 5 MG/ML
10 INJECTION, SOLUTION INTRAVENOUS EVERY 4 HOURS PRN
Status: ACTIVE | OUTPATIENT
Start: 2023-10-10 | End: 2023-10-10

## 2023-10-10 RX ORDER — POLYETHYLENE GLYCOL 3350 17 G/17G
1 POWDER, FOR SOLUTION ORAL
Status: DISCONTINUED | OUTPATIENT
Start: 2023-10-10 | End: 2023-10-17 | Stop reason: HOSPADM

## 2023-10-10 RX ADMIN — SODIUM CHLORIDE, POTASSIUM CHLORIDE, SODIUM LACTATE AND CALCIUM CHLORIDE: 600; 310; 30; 20 INJECTION, SOLUTION INTRAVENOUS at 22:25

## 2023-10-10 RX ADMIN — DEXAMETHASONE SODIUM PHOSPHATE 10 MG: 4 INJECTION INTRA-ARTICULAR; INTRALESIONAL; INTRAMUSCULAR; INTRAVENOUS; SOFT TISSUE at 22:33

## 2023-10-10 RX ADMIN — PROPOFOL 100 MG: 10 INJECTION, EMULSION INTRAVENOUS at 22:30

## 2023-10-10 RX ADMIN — ONDANSETRON 8 MG: 2 INJECTION INTRAMUSCULAR; INTRAVENOUS at 23:27

## 2023-10-10 RX ADMIN — KETOROLAC TROMETHAMINE 15 MG: 30 INJECTION, SOLUTION INTRAMUSCULAR; INTRAVENOUS at 23:27

## 2023-10-10 RX ADMIN — MIDAZOLAM 2 MG: 1 INJECTION, SOLUTION INTRAMUSCULAR; INTRAVENOUS at 22:26

## 2023-10-10 RX ADMIN — PROPOFOL 50 MG: 10 INJECTION, EMULSION INTRAVENOUS at 23:30

## 2023-10-10 RX ADMIN — PROTHROMBIN, COAGULATION FACTOR VII HUMAN, COAGULATION FACTOR IX HUMAN, COAGULATION FACTOR X HUMAN, PROTEIN C, PROTEIN S HUMAN, AND WATER 2000 UNITS: KIT at 22:37

## 2023-10-10 RX ADMIN — FENTANYL CITRATE 50 MCG: 50 INJECTION, SOLUTION INTRAMUSCULAR; INTRAVENOUS at 23:28

## 2023-10-10 RX ADMIN — CEFOTETAN DISODIUM 2 G: 2 INJECTION, POWDER, FOR SOLUTION INTRAMUSCULAR; INTRAVENOUS at 22:32

## 2023-10-10 RX ADMIN — CLINDAMYCIN PHOSPHATE 900 MG: 150 INJECTION, SOLUTION INTRAMUSCULAR; INTRAVENOUS at 22:32

## 2023-10-10 ASSESSMENT — ENCOUNTER SYMPTOMS
CHILLS: 1
ABDOMINAL PAIN: 1

## 2023-10-10 ASSESSMENT — FIBROSIS 4 INDEX: FIB4 SCORE: 0.62

## 2023-10-10 ASSESSMENT — PAIN DESCRIPTION - PAIN TYPE: TYPE: ACUTE PAIN

## 2023-10-11 LAB
FUNGUS SPEC FUNGUS STN: NORMAL
GLUCOSE BLD STRIP.AUTO-MCNC: 249 MG/DL (ref 65–99)
GLUCOSE BLD STRIP.AUTO-MCNC: 313 MG/DL (ref 65–99)
GLUCOSE BLD STRIP.AUTO-MCNC: 325 MG/DL (ref 65–99)
GRAM STN SPEC: NORMAL
LACTATE SERPL-SCNC: 0.8 MMOL/L (ref 0.5–2)
LACTATE SERPL-SCNC: 0.9 MMOL/L (ref 0.5–2)
LACTATE SERPL-SCNC: 1 MMOL/L (ref 0.5–2)
SIGNIFICANT IND 70042: NORMAL
SIGNIFICANT IND 70042: NORMAL
SITE SITE: NORMAL
SITE SITE: NORMAL
SOURCE SOURCE: NORMAL
SOURCE SOURCE: NORMAL

## 2023-10-11 PROCEDURE — 99232 SBSQ HOSP IP/OBS MODERATE 35: CPT | Performed by: STUDENT IN AN ORGANIZED HEALTH CARE EDUCATION/TRAINING PROGRAM

## 2023-10-11 PROCEDURE — 36415 COLL VENOUS BLD VENIPUNCTURE: CPT

## 2023-10-11 PROCEDURE — 700102 HCHG RX REV CODE 250 W/ 637 OVERRIDE(OP): Performed by: ANESTHESIOLOGY

## 2023-10-11 PROCEDURE — 700102 HCHG RX REV CODE 250 W/ 637 OVERRIDE(OP): Performed by: STUDENT IN AN ORGANIZED HEALTH CARE EDUCATION/TRAINING PROGRAM

## 2023-10-11 PROCEDURE — 700111 HCHG RX REV CODE 636 W/ 250 OVERRIDE (IP): Mod: JZ | Performed by: STUDENT IN AN ORGANIZED HEALTH CARE EDUCATION/TRAINING PROGRAM

## 2023-10-11 PROCEDURE — 82962 GLUCOSE BLOOD TEST: CPT | Mod: 91

## 2023-10-11 PROCEDURE — 700111 HCHG RX REV CODE 636 W/ 250 OVERRIDE (IP): Mod: JZ | Performed by: ANESTHESIOLOGY

## 2023-10-11 PROCEDURE — A9270 NON-COVERED ITEM OR SERVICE: HCPCS | Performed by: ANESTHESIOLOGY

## 2023-10-11 PROCEDURE — 770020 HCHG ROOM/CARE - TELE (206)

## 2023-10-11 PROCEDURE — 700105 HCHG RX REV CODE 258: Performed by: STUDENT IN AN ORGANIZED HEALTH CARE EDUCATION/TRAINING PROGRAM

## 2023-10-11 PROCEDURE — A9270 NON-COVERED ITEM OR SERVICE: HCPCS | Performed by: STUDENT IN AN ORGANIZED HEALTH CARE EDUCATION/TRAINING PROGRAM

## 2023-10-11 PROCEDURE — 97602 WOUND(S) CARE NON-SELECTIVE: CPT

## 2023-10-11 PROCEDURE — 83605 ASSAY OF LACTIC ACID: CPT

## 2023-10-11 RX ORDER — OXYCODONE HYDROCHLORIDE 5 MG/1
5 TABLET ORAL EVERY 4 HOURS PRN
Status: DISCONTINUED | OUTPATIENT
Start: 2023-10-11 | End: 2023-10-14

## 2023-10-11 RX ORDER — MORPHINE SULFATE 4 MG/ML
3 INJECTION INTRAVENOUS EVERY 4 HOURS PRN
Status: DISCONTINUED | OUTPATIENT
Start: 2023-10-11 | End: 2023-10-16

## 2023-10-11 RX ORDER — LINEZOLID 600 MG/1
600 TABLET, FILM COATED ORAL EVERY 12 HOURS
Status: DISCONTINUED | OUTPATIENT
Start: 2023-10-11 | End: 2023-10-17 | Stop reason: HOSPADM

## 2023-10-11 RX ADMIN — INSULIN HUMAN 4 UNITS: 100 INJECTION, SOLUTION PARENTERAL at 11:57

## 2023-10-11 RX ADMIN — OXYCODONE HYDROCHLORIDE 5 MG: 5 SOLUTION ORAL at 00:34

## 2023-10-11 RX ADMIN — LINEZOLID 600 MG: 600 TABLET, FILM COATED ORAL at 17:22

## 2023-10-11 RX ADMIN — INSULIN HUMAN 4 UNITS: 100 INJECTION, SOLUTION PARENTERAL at 17:23

## 2023-10-11 RX ADMIN — CLINDAMYCIN PHOSPHATE 600 MG: 600 INJECTION, SOLUTION INTRAVENOUS at 14:37

## 2023-10-11 RX ADMIN — DOCUSATE SODIUM 50 MG AND SENNOSIDES 8.6 MG 2 TABLET: 8.6; 5 TABLET, FILM COATED ORAL at 17:22

## 2023-10-11 RX ADMIN — VANCOMYCIN HYDROCHLORIDE 1000 MG: 5 INJECTION, POWDER, LYOPHILIZED, FOR SOLUTION INTRAVENOUS at 14:37

## 2023-10-11 RX ADMIN — FENTANYL CITRATE 25 MCG: 50 INJECTION, SOLUTION INTRAMUSCULAR; INTRAVENOUS at 00:36

## 2023-10-11 RX ADMIN — PIPERACILLIN AND TAZOBACTAM 4.5 G: 4; .5 INJECTION, POWDER, FOR SOLUTION INTRAVENOUS at 05:43

## 2023-10-11 RX ADMIN — INSULIN HUMAN 2 UNITS: 100 INJECTION, SOLUTION PARENTERAL at 09:17

## 2023-10-11 RX ADMIN — PIPERACILLIN AND TAZOBACTAM 4.5 G: 4; .5 INJECTION, POWDER, FOR SOLUTION INTRAVENOUS at 11:55

## 2023-10-11 RX ADMIN — INSULIN HUMAN 4 UNITS: 100 INJECTION, SOLUTION PARENTERAL at 23:52

## 2023-10-11 RX ADMIN — OXYCODONE 5 MG: 5 TABLET ORAL at 12:00

## 2023-10-11 RX ADMIN — HALOPERIDOL LACTATE 1 MG: 5 INJECTION, SOLUTION INTRAMUSCULAR at 00:19

## 2023-10-11 RX ADMIN — CLINDAMYCIN PHOSPHATE 600 MG: 600 INJECTION, SOLUTION INTRAVENOUS at 05:37

## 2023-10-11 RX ADMIN — PIPERACILLIN AND TAZOBACTAM 4.5 G: 4; .5 INJECTION, POWDER, FOR SOLUTION INTRAVENOUS at 20:02

## 2023-10-11 RX ADMIN — VANCOMYCIN HYDROCHLORIDE 750 MG: 5 INJECTION, POWDER, LYOPHILIZED, FOR SOLUTION INTRAVENOUS at 02:19

## 2023-10-11 RX ADMIN — FENTANYL CITRATE 25 MCG: 50 INJECTION, SOLUTION INTRAMUSCULAR; INTRAVENOUS at 00:20

## 2023-10-11 RX ADMIN — SODIUM CHLORIDE, POTASSIUM CHLORIDE, SODIUM LACTATE AND CALCIUM CHLORIDE: 600; 310; 30; 20 INJECTION, SOLUTION INTRAVENOUS at 01:37

## 2023-10-11 RX ADMIN — INSULIN GLARGINE-YFGN 20 UNITS: 100 INJECTION, SOLUTION SUBCUTANEOUS at 17:24

## 2023-10-11 RX ADMIN — OXYCODONE 5 MG: 5 TABLET ORAL at 09:12

## 2023-10-11 ASSESSMENT — COGNITIVE AND FUNCTIONAL STATUS - GENERAL
TOILETING: A LOT
CLIMB 3 TO 5 STEPS WITH RAILING: A LOT
DRESSING REGULAR LOWER BODY CLOTHING: A LOT
HELP NEEDED FOR BATHING: A LOT
CLIMB 3 TO 5 STEPS WITH RAILING: A LOT
MOVING TO AND FROM BED TO CHAIR: A LOT
WALKING IN HOSPITAL ROOM: A LOT
MOBILITY SCORE: 12
STANDING UP FROM CHAIR USING ARMS: A LOT
TURNING FROM BACK TO SIDE WHILE IN FLAT BAD: A LOT
MOVING FROM LYING ON BACK TO SITTING ON SIDE OF FLAT BED: A LOT
MOVING TO AND FROM BED TO CHAIR: A LOT
TURNING FROM BACK TO SIDE WHILE IN FLAT BAD: A LOT
WALKING IN HOSPITAL ROOM: A LOT
SUGGESTED CMS G CODE MODIFIER MOBILITY: CL
STANDING UP FROM CHAIR USING ARMS: A LOT
MOBILITY SCORE: 12
MOVING FROM LYING ON BACK TO SITTING ON SIDE OF FLAT BED: A LOT
DAILY ACTIVITIY SCORE: 16
SUGGESTED CMS G CODE MODIFIER DAILY ACTIVITY: CK
SUGGESTED CMS G CODE MODIFIER MOBILITY: CL
DRESSING REGULAR UPPER BODY CLOTHING: A LOT

## 2023-10-11 ASSESSMENT — LIFESTYLE VARIABLES
SUBSTANCE_ABUSE: 0
AVERAGE NUMBER OF DAYS PER WEEK YOU HAVE A DRINK CONTAINING ALCOHOL: 0
TOTAL SCORE: 0
EVER FELT BAD OR GUILTY ABOUT YOUR DRINKING: NO
EVER HAD A DRINK FIRST THING IN THE MORNING TO STEADY YOUR NERVES TO GET RID OF A HANGOVER: NO
CONSUMPTION TOTAL: NEGATIVE
TOTAL SCORE: 0
HAVE PEOPLE ANNOYED YOU BY CRITICIZING YOUR DRINKING: NO
HAVE YOU EVER FELT YOU SHOULD CUT DOWN ON YOUR DRINKING: NO
ON A TYPICAL DAY WHEN YOU DRINK ALCOHOL HOW MANY DRINKS DO YOU HAVE: 0
HOW MANY TIMES IN THE PAST YEAR HAVE YOU HAD 5 OR MORE DRINKS IN A DAY: 0
ALCOHOL_USE: NO
TOTAL SCORE: 0
DOES PATIENT WANT TO STOP DRINKING: NO

## 2023-10-11 ASSESSMENT — PATIENT HEALTH QUESTIONNAIRE - PHQ9
2. FEELING DOWN, DEPRESSED, IRRITABLE, OR HOPELESS: NOT AT ALL
1. LITTLE INTEREST OR PLEASURE IN DOING THINGS: NOT AT ALL
SUM OF ALL RESPONSES TO PHQ9 QUESTIONS 1 AND 2: 0

## 2023-10-11 ASSESSMENT — FIBROSIS 4 INDEX: FIB4 SCORE: 0.56

## 2023-10-11 ASSESSMENT — PAIN DESCRIPTION - PAIN TYPE
TYPE: SURGICAL PAIN
TYPE: SURGICAL PAIN
TYPE: ACUTE PAIN
TYPE: SURGICAL PAIN
TYPE: ACUTE PAIN
TYPE: SURGICAL PAIN
TYPE: ACUTE PAIN
TYPE: ACUTE PAIN
TYPE: SURGICAL PAIN
TYPE: SURGICAL PAIN
TYPE: ACUTE PAIN

## 2023-10-11 ASSESSMENT — ENCOUNTER SYMPTOMS
EYE PAIN: 0
FEVER: 0
COUGH: 0
BLURRED VISION: 0
CHILLS: 0
VOMITING: 0
DIZZINESS: 0
INSOMNIA: 0
ABDOMINAL PAIN: 0
PALPITATIONS: 0
BACK PAIN: 0
NAUSEA: 0
HEADACHES: 0
SHORTNESS OF BREATH: 0

## 2023-10-11 NOTE — PROGRESS NOTES
Received report from previous shift RN  Assessment complete.  A&O x 4. Patient calls appropriately.  Patient repositioned x2 assist. Q2 turns. Bed alarm on.   Pain managed with prescribed medications per MAR.  Denies N&V. Tolerating diabetic diet.  Skin per flowsheets  + void, + flatus, BM PTA.  Patient denies SOB on 1L NC.  SCD's on.  Patient pleasant and cooperative.  Review plan with of care with patient. Call light and personal belongings with in reach. Hourly rounding in place. All needs met at this time.

## 2023-10-11 NOTE — CARE PLAN
The patient is Watcher - Medium risk of patient condition declining or worsening    Shift Goals  Clinical Goals: pain management, comfort  Patient Goals: rest, comfort    Progress made toward(s) clinical / shift goals:  Pain managed per MAR. Patient repositioned Q2H. TAPS and heel float boots in place.    Problem: Knowledge Deficit - Standard  Goal: Patient and family/care givers will demonstrate understanding of plan of care, disease process/condition, diagnostic tests and medications  Description: Target End Date:  1-3 days or as soon as patient condition allows    Document in Patient Education    1.  Patient and family/caregiver oriented to unit, equipment, visitation policy and means for communicating concern  2.  Complete/review Learning Assessment  3.  Assess knowledge level of disease process/condition, treatment plan, diagnostic tests and medications  4.  Explain disease process/condition, treatment plan, diagnostic tests and medications  Outcome: Progressing     Problem: Pain - Standard  Goal: Alleviation of pain or a reduction in pain to the patient’s comfort goal  Description: Target End Date:  Prior to discharge or change in level of care    Document on Vitals flowsheet    1.  Document pain using the appropriate pain scale per order or unit policy  2.  Educate and implement non-pharmacologic comfort measures (i.e. relaxation, distraction, massage, cold/heat therapy, etc.)  3.  Pain management medications as ordered  4.  Reassess pain after pain med administration per policy  5.  If opiods administered assess patient's response to pain medication is appropriate per POSS sedation scale  6.  Follow pain management plan developed in collaboration with patient and interdisciplinary team (including palliative care or pain specialists if applicable)  Outcome: Progressing     Problem: Skin Integrity  Goal: Skin integrity is maintained or improved  Description: Target End Date:  Prior to discharge or change in  level of care    Document interventions on Skin Risk/Rene flowsheet groups and corresponding LDA    1.  Assess and monitor skin integrity, appearance and/or temperature  2.  Assess risk factors for impaired skin integrity and/or pressures ulcers  3.  Implement precautions to protect skin integrity in collaboration with interdisciplinary team  4.  Implement pressure ulcer prevention protocol if at risk for skin breakdown  5.  Confirm wound care consult if at risk for skin breakdown  6.  Ensure patient use of pressure relieving devices  (Low air loss bed, waffle overlay, heel protectors, ROHO cushion, etc)  Outcome: Progressing

## 2023-10-11 NOTE — ASSESSMENT & PLAN NOTE
Patient denies recently taking Xarelto however our pharmacist here called her SNF and she is still taking her medication.

## 2023-10-11 NOTE — ANESTHESIA PROCEDURE NOTES
Airway    Date/Time: 10/10/2023 10:31 PM    Performed by: Belkis Elder M.D.  Authorized by: Belkis Elder M.D.    Location:  OR  Urgency:  Elective  Indications for Airway Management:  Anesthesia      Spontaneous Ventilation: absent    Sedation Level:  Deep  Preoxygenated: Yes    Mask Difficulty Assessment:  0 - not attempted  Final Airway Type:  Supraglottic airway  Final Supraglottic Airway:  Standard LMA    SGA Size:  4  Airway Seal Pressure (cm H2O):  22  Number of Attempts at Approach:  1

## 2023-10-11 NOTE — WOUND TEAM
[facility]  Inpatient Services  Initial Wound and Skin Care Evaluation    Admission Date: 10/10/2023     Last order of IP CONSULT TO WOUND CARE was found on 10/11/2023 from Hospital Encounter on 10/10/2023     HPI, PMH, SH: Reviewed    Past Surgical History:   Procedure Laterality Date    IRRIGATION & DEBRIDEMENT GENERAL Left 10/10/2023    Procedure: IRRIGATION AND DEBRIDEMENT, WOUND;  Surgeon: Praveen Chisholm M.D.;  Location: SURGERY [facility];  Service: Trauma    DENTAL OSTEOTOMY N/A 2/6/2023    Procedure: I&D OF LEFT BUCCAL ABSCESS; EXTRACTION OF TEETH;  Surgeon: Lore Dukes D.D.S.;  Location: SURGERY SAME DAY [facility];  Service: Oral Surgery    ERCP IN OR  10/30/2009    Performed by CESAR BATES at SURGERY [facility] ORS    CHOLECYSTECTOMY  10/09    OTHER ABDOMINAL SURGERY      tubal ligation     Social History     Tobacco Use    Smoking status: Never    Smokeless tobacco: Never   Substance Use Topics    Alcohol use: Not on file     No chief complaint on file.    Diagnosis: Gangrene (HCC) [I96]    Unit where seen by Wound Team: T426/00     WOUND CONSULT RELATED TO:  Sacrum, BL heels    WOUND TEAM PLAN OF CARE - Frequency of Follow-up:   Nursing to follow dressing orders written for wound care. Contact wound team if area fails to progress, deteriorates or with any questions/concerns if something comes up before next scheduled follow up (See below as to whether wound is following and frequency of wound follow up)   Not following, consult as needed  - Sacrum, BL heels    WOUND HISTORY:   Patient unable to provide wound history. Reporting pain to sacrum.    Pressure injury to left heel present during previous admission.       WOUND ASSESSMENT/LDA  Wound 09/23/23 Pressure Injury Heel Medial Left POA St 2 (Active)   Date First Assessed/Time First Assessed: 09/23/23 0700   Present on Original Admission: Yes  Primary Wound Type: Pressure Injury  Location: Heel  Wound Orientation: Medial   Laterality: Left  Wound Description (Comments): POA St 2      Assessments 10/11/2023  4:00 PM   Wound Image     Site Assessment Pink;Boggy   Periwound Assessment Clean;Dry;Intact   Margins Attached edges;Defined edges   Closure Adhesive bandage   Drainage Amount None   Dressing Status Clean;Dry;Intact   Dressing Changed New   Dressing Cleansing/Solutions Not Applicable   Dressing Options Offloading Dressing - Heel   Dressing Change/Treatment Frequency Every 72 hrs, and As Needed   NEXT Dressing Change/Treatment Date 10/14/23   NEXT Weekly Photo (Inpatient Only) 10/18/23   Wound Team Following Not following   Pressure Injury Stage Stage 2   Shape Circular       Wound 10/11/23 Pressure Injury Coccyx;Sacrum POA Stage 2 (Active)   Date First Assessed/Time First Assessed: 10/11/23 1600   Present on Original Admission: Yes  Hand Hygiene Completed: Yes  Primary Wound Type: Pressure Injury  Location: Coccyx;Sacrum  Wound Description (Comments): POA Stage 2      Assessments 10/11/2023  4:00 PM   Wound Image     Site Assessment Pink;White;Painful   Periwound Assessment Clean;Dry;Intact   Margins Attached edges;Defined edges   Closure Secondary intention   Drainage Amount Scant   Drainage Description Serosanguineous   Treatments Cleansed;Site care;Autolytic debridement   Wound Cleansing Normal Saline Irrigation   Periwound Protectant No-sting Skin Prep   Dressing Status Clean;Intact;Dry   Dressing Changed Changed   Dressing Cleansing/Solutions Not Applicable   Dressing Options Hydrocolloid Thin;Offloading Dressing - Sacral   Dressing Change/Treatment Frequency Every 72 hrs, and As Needed   NEXT Dressing Change/Treatment Date 10/14/23   NEXT Weekly Photo (Inpatient Only) 10/18/23   Wound Team Following Not following   Pressure Injury Stage Stage 2      R Anterior Ankle        Vascular:    TARSHA:   No results found.    Lab Values:    Lab Results   Component Value Date/Time    WBC 10.9 (H) 10/10/2023 08:43 PM    RBC 3.94 (L)  10/10/2023 08:43 PM    HEMOGLOBIN 11.4 (L) 10/10/2023 08:43 PM    HEMATOCRIT 35.7 (L) 10/10/2023 08:43 PM    HBA1C 13.4 (H) 10/10/2023 08:43 PM         Culture Results show:  No results found for this or any previous visit (from the past 720 hour(s)).    Pain Level/Medicated:   C/o pain to sacrum        INTERVENTIONS BY WOUND TEAM:  Chart and images reviewed. Discussed with bedside RN. All areas of concern (based on picture review, LDA review and discussion with bedside RN) have been thoroughly assessed. Documentation of areas based on significant findings. This RN in to assess patient. Performed standard wound care which includes appropriate positioning, dressing removal and non-selective debridement. Pictures and measurements obtained weekly if/when required.    Wound:  SACROCOCCYGEAL  Preparation for Dressing removal: Removed without difficulty  Cleansed/Non-selectively Debrided with:  Normal Saline and Gauze  Lindy wound: Cleansed with Normal Saline and Gauze, Prepped with No Sting  Primary Dressing:  hydrocolloid thin  Secondary (Outer) Dressing: sacral offloading dressing     Wound:  R HEEL  Primary Dressing:  heel offloading dressing    Advanced Wound Care Discharge Planning  Number of Clinicians necessary to complete wound care: 1  Is patient requiring IV pain medications for dressing changes:  No   Length of time for dressing change 20 min. (This does not include chart review, pre-medication time, set up, clean up or time spent charting.)    Interdisciplinary consultation: Patient, Bedside RN    EVALUATION / RATIONALE FOR TREATMENT:     Date:  10/11/23  Wound Status:  Initial evaluation    Patient sacrococcygeal area with POA stage 2 pressure injury. Reticular dermis present to wound bed. Thin hydrocolloid applied to maintain occlusive, moist wound healing environment. Waffle overlay ordered for additional offloading.    POA stage 2 pressure injury to left heel still with deflated blister. Offloading  dressing applied to prevent further trauma to skin.    R anterior ankle with nonblanching discoloration. Appears to be scar tissue.         Goals: Steady decrease in wound area and depth weekly.    NURSING PLAN OF CARE ORDERS:  Dressing changes: See Dressing Care orders  RN Prevention Protocol    NUTRITION RECOMMENDATIONS   Wound Team Recommendations:  N/A    DIET ORDERS (From admission to next 24h)       Start     Ordered    10/11/23 4359  Diet NPO Restrict to: Sips with Medications  AT MIDNIGHT      Question:  Diet NPO Restrict to:  Answer:  Sips with Medications    10/11/23 0824    10/11/23 0825  Diet Order Diet: Consistent CHO (Diabetic)  ALL MEALS        Question:  Diet:  Answer:  Consistent CHO (Diabetic)    10/11/23 0824                    PREVENTATIVE INTERVENTIONS:    Q shift Rene - performed per nursing policy  Q shift pressure point assessments - performed per nursing policy    Surface/Positioning  Standard/trauma mattress - Currently in Place  Reposition q 2 hours - Ordered  Waffle overlay  - Ordered    Offloading/Redistribution  Sacral offloading dressing (Silicone dressing) - Currently in Place  Heel offloading dressing (Silicone dressing) - Applied this Visit  Heel float boots (Prevalon boot) - Currently in Place      Containment/Moisture Prevention    Vivas Catheter - Currently in Place    Anticipated discharge plans:  TBD        Vac Discharge Needs:  Vac Discharge plan is purely a recommendation from wound team and not a requirement for discharge unless otherwise stated by physician.  Not Applicable Pt not on a wound vac

## 2023-10-11 NOTE — OP REPORT
DATE OF OPERATION:  10/10/2023    PREOPERATIVE DIAGNOSIS:  Necrotizing soft tissue infection     POSTOPERATIVE DIAGNOSIS: Necrotizing soft tissue infection    PROCEDURE PERFORMED: Debridement of left groin, left labia, and left lower abdominal wall    SURGEON:    Praveen Chisholm M.D.    ASSISTANT:    Linsey Wegener, APRN.    ANESTHESIOLOGIST:  Belkis Elder M.D.    ANESTHESIA:   Laryngeal mask anesthesia.    ASA CLASSIFICATION:  III. Emergent.    INDICATIONS: The patient is a 63 year-old woman with a necrotizing soft tissue infection of the left groin, perineum, and lower abdominal wall. She is taken to the operating room for debridement.    The nature of the surgical procedure warranted additional skilled operative assistance from an Advanced Registered Nurse Practitioner (ARNP). The assistant was present during the entire operation. The surgical assistant performed the following: provided assistance with optimal surgical exposure of the operative field.    FINDINGS: Abscess cavity in the left groin, pus tracking medially across the midline overlying the pub symphysis, inferolaterally into the left labia, and superolaterally onto the left lower abdominal wall.     WOUND CLASSIFICATION:  Class Infection present at the time of surgery (PATOS).    SPECIMEN:     Left groin infection culture.    ESTIMATED BLOOD LOSS:  200 mL.    PROCEDURE: Following informed consent, the patient was properly identified, taken to the operating room and placed in supine position where a laryngeal mask anesthesia was administered. Intravenous antibiotics were administered by the anesthesiologist in correct time interval. A Vivas catheter was aseptically placed. Sequential compression devices were employed. The patient was placed in a high lithotomy position with careful attention to padding and support of the extremities. A safety retension strap was secured. Active patient warming devices were utilized. The operative site was widely  prepped and draped into a sterile field.  A timeout was conducted with the full attention and participation of all operating room personnel.      An incision was made enlarging one of the already draining sites in the medial area of induration. The abscess cavity was entered bluntly and cultures were taken. The incision was extended inferiorly along the left labia and medially across the midline. There was noted to be continued purulent drainage from the superolateral aspect of the wound. The tissues easily dissected through the subcutaneous tissues above the fascia in the direction. Electrocautery was used to open the wound obliquely towards the anterior superior iliac crest. There was continued purulent material in the subcutaneous tissue as well as pus superficial to the fascia. All loculations were broken up and necrotic tissue excised using electrocautery. Viable appearing skin was left in place in the hopes of facilitating wound closure in the future. Once back to healthy viable appearing tissue, hemostasis was achieved with electrocautery and the wound was irrigated with warm sterile saline and suctioned dry. The wound was packed with Betadine soaked Kerlex gauze with ABD pads placed over the wound.    The patient tolerated the procedure well, and there were no apparent complications. All sponge, needle, and instrument counts were correct on 2 separate occasions. The patient was placed back in the supine position then was awakened, extubated, and transferred to  the post anesthesia care unit (PACU) in satisfactory condition.       ____________________________________     Praveen Chisholm M.D.    DD: 10/10/2023  11:43 PM

## 2023-10-11 NOTE — ANESTHESIA POSTPROCEDURE EVALUATION
Patient: Fannie Driver    Procedure Summary     Date: 10/10/23 Room / Location: [place] [id number] / SURGERY [facility]    Anesthesia Start: 2225 Anesthesia Stop: 2342    Procedure: IRRIGATION AND DEBRIDEMENT, WOUND (Left: Groin) Diagnosis: (Necrotizing soft tissue infection)    Surgeons: Praveen Chisholm M.D. Responsible Provider: Belkis Elder M.D.    Anesthesia Type: general ASA Status: 3 - Emergent          Final Anesthesia Type: general  Last vitals  BP   Blood Pressure: 99/57    Temp   35.9 °C (96.6 °F)    Pulse   76   Resp   15    SpO2   96 %      Anesthesia Post Evaluation    Patient location during evaluation: PACU  Patient participation: complete - patient participated  Level of consciousness: awake and alert    Airway patency: patent  Anesthetic complications: no  Cardiovascular status: hemodynamically stable  Respiratory status: acceptable  Hydration status: euvolemic    PONV: none          No notable events documented.     Nurse Pain Score: 2 (NPRS)

## 2023-10-11 NOTE — PROGRESS NOTES
Report received from pacu RN.     Assessment complete.  A&O 4.  Tele monitoring initiated.  Skin per note.  Patient arrived back to T426 via hospital bed.

## 2023-10-11 NOTE — PROGRESS NOTES
[facility] TRIAGE OFFICER DIRECT ADMISSION REPORT  Transferring facility: [facility]    Transferring physician: Dr Auguste    Transferring facility/physician contact number: N/A    Chief complaint: Suprapubic gangrene    Pertinent history & patient course: Patient 63-year-old female was recently been admitted to [facility] for DKA subsequently discharged with oral antibiotics for suprapubic redness and tenderness that time she was subsequent transition to rehab in the [facility] patient's redness and tenderness in the suprapubic area worsen to the point where the area opened up and freely started expressing pus.  Patient presented the emergency department at that area.  Patient has large amounts of fluctuance and freely palpated bowl purulent drainage coming out of her suprapubic area.  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  Patient was placed on appropriate antimicrobials and was deemed to require higher level of care.  [facility] was contacted for transition.  Patient has mildly elevated lactic acid mildly elevated leukocytosis vital signs within acceptable limits.    Pertinent imaging & lab results: Mildly elevated lactic acid mild leukocytosis CT imaging suggestive gas gangrene/Annia's gangrene.    Code Status: Full per transferring provider, I personally verified with the transferring provider patient's code status and the transferring provider has confirmed this with the patient.    Further work up or recommendations per triage officer prior to transfer: None    Consultants called prior to transfer and pertinent input from consultants: None    Patient accepted for transfer: Yes    Consultants to be called upon arrival: General surgery for incision and drainage    Admission status: Inpatient.     Floor requested: GEN surge with remote telemetry    If ICU transfer, name of intensivist case discussed with and pertinent input from  critical care: N/A    Please inform the triage officer upon arrival of the patient to [facility] for assignment of a hospitalist to perform admission.     For any question or concerns regarding the care of this patient, please reach out to the assigned hospitalist.

## 2023-10-11 NOTE — ANESTHESIA TIME REPORT
Anesthesia Start and Stop Event Times     Date Time Event    10/10/2023 2215 Ready for Procedure     2225 Anesthesia Start     2342 Anesthesia Stop        Responsible Staff  10/10/23    Name Role Begin End    Belkis Elder M.D. Anesth 2225 2342        Overtime Reason:  no overtime (within assigned shift)    Comments:

## 2023-10-11 NOTE — ASSESSMENT & PLAN NOTE
SSI   lantus 30 U daily for now with ISS; reportedly taking 44U lantus daily at home in addition to ISS  Monitor blood sugars, adjust insulin regimen as needed

## 2023-10-11 NOTE — OR NURSING
2337  Patient arrived from OR via bed, siderails up, brake locked. Oral airway in place. Received report from Dr. Elder and Roz STRONG RN, assumed care. VSS. Left groin wound open with x 2 Kerlix packing, ABD dressing CDI, panties in place. Patient appears comfortable with no s/s of pain or nausea noted. Orders reviewed and implemented. 2355 Rouses to voice, oral airway removed. 0010 Awake, oriented x 4. Reports 6/10 pain, Fentanyl administered. Haldol administered for reported nausea. 0025  Nausea improved, tolerating ice chips. 0034 Oxycodone administered for continued report of pain.  0045  Patient resting comfortably, reports pain improved and tolerable. VSS stable. Groin dressing remains CDI. Recovery complete. Awaiting bedside lab draw. 0105 Report to Phillip SORIANO RN. 0108 Lab bedside to draw lactic. 0119  Discharged to room with oxygen and monitor. Phillip SORIANO RN bedside to assume care.

## 2023-10-11 NOTE — CARE PLAN
The patient is Stable - Low risk of patient condition declining or worsening    Shift Goals  Clinical Goals: Admit, updated POC, provide medication PRN for pain  Patient Goals: update on POC    Progress made toward(s) clinical / shift goals:  admitted and oriented to unit, sent to pre-op, came back from PACU. Pain managed per MAR, patient provided education regarding POC, goals of treatment and pain management. Verbalizes understanding at this time.   Problem: Knowledge Deficit - Standard  Goal: Patient and family/care givers will demonstrate understanding of plan of care, disease process/condition, diagnostic tests and medications  Outcome: Not Progressing     Problem: Hemodynamics  Goal: Patient's hemodynamics, fluid balance and neurologic status will be stable or improve  Outcome: Progressing     Problem: Fluid Volume  Goal: Fluid volume balance will be maintained  Outcome: Progressing     Problem: Pain - Standard  Goal: Alleviation of pain or a reduction in pain to the patient’s comfort goal  Outcome: Progressing     Problem: Skin Integrity  Goal: Skin integrity is maintained or improved  Outcome: Progressing       Patient is not progressing towards the following goals:      Problem: Knowledge Deficit - Standard  Goal: Patient and family/care givers will demonstrate understanding of plan of care, disease process/condition, diagnostic tests and medications  Outcome: Not Progressing

## 2023-10-11 NOTE — ASSESSMENT & PLAN NOTE
S/p surgical debridement of left groin, abdominal wall, labia on 10/10  S/p debridement 10/12  Culture growing MRSA and bacteroides  ID recommend zyvox and unasyn (can transition unasyn to augmentin on discharge), end date 10/26  Wound vac placed  No plans for further surgery

## 2023-10-11 NOTE — ASSESSMENT & PLAN NOTE
10/11 I&D with betadine packing done  10/12 OR for irrigation and sharp debridement. Unable to place wound vac in OR.  10/14 Wound team placed wound vac.  Wound care dressing changes and wound team.   Antibiotics per Medicine team. Cultures pending. Preliminary with staph and urogenital johnie.     Chief Complaint:   Patient presents with:   Other: Patient thinks he has hemmorroids  Fatigue  Nausea: and vomitting for 2 days      HPI:   This is a 45year old male coming in for acute illness including nausea vomiting with intermittent abdominal discomfo HEMATOLOGIC:  Denies bleeding or bruising. PSYCHIATRIC:  Denies depression or anxiety.     ENDOCRINOLOGIC:  Denies cold or heat intolerance,   ALLERGIES:  Denies allergic response,    EXAM:   BP 98/70 (BP Location: Right arm, Patient Position: Sitting, Patient/Caregiver Education: Patient/Caregiver Education: There are no barriers to learning. Medical education done. Outcome: Patient verbalizes understanding.  Patient is notified to call with any questions, complications, allergies, or worseni

## 2023-10-11 NOTE — PROGRESS NOTES
Pharmacy Vancomycin Kinetics Note for 10/10/2023     63 y.o. female on Vancomycin day # 1     Vancomycin Indication (AUC Dosing): Skin/skin structure infection    Provider specified end date: 10/15/23    Active Antibiotics (From admission, onward)      Ordered     Ordering Provider       Tue Oct 10, 2023  9:25 PM    10/10/23 2125  vancomycin (Vancocin) 750 mg in  mL IVPB  (vancomycin (VANCOCIN) IV (LD + Maintenance))  ONCE         Shayan Escobar M.D.       Tue Oct 10, 2023  8:32 PM    10/10/23 2032  clindamycin (Cleocin) IVPB premix 600 mg  EVERY 8 HOURS         Shayan Escobar M.D.    10/10/23 2032  MD Alert...Vancomycin per Pharmacy  PHARMACY TO DOSE        Question:  Indication(s) for vancomycin?  Answer:  Skin and soft tissue infection    Shayan Escobar M.D.    10/10/23 2032  piperacillin-tazobactam (Zosyn) 4.5 g in  mL IVPB  (piperacillin-tazobactam (ZOSYN) IV (Extended-infusion) PANEL )  ONCE        See Hyperspace for full Linked Orders Report.    Shayan Escobar M.D.    10/10/23 2032  piperacillin-tazobactam (Zosyn) 4.5 g in  mL IVPB  (piperacillin-tazobactam (ZOSYN) IV (Extended-infusion) PANEL )  EVERY 8 HOURS        See Hyperspace for full Linked Orders Report.    Shayan Escobar M.D.          Dosing Weight: 81 kg (178 lb 9.2 oz)    Admission History: Admitted on 10/10/2023 for [place] gangrene  Pertinent history: Patient recently admitted to [facility] and discharged on PO antibiotics for suprapubic redness/tenderness. Pain in the suprapubic region continued to worsen, eventually opening up and expressing pus. 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 Transferred to [facility] and started on empiric antibiotics.    Allergies:     Patient has no known allergies.     Pertinent cultures to date:     Results       Procedure Component Value Units Date/Time    MRSA By PCR (Amp) [[id number]]     Order Status: Sent Specimen: Respirate  from [facility]     Blood Culture [[id number]]     Order Status: No result Specimen: Blood from Peripheral     Blood Culture [[id number]]     Order Status: No result Specimen: Blood from Peripheral     Urinalysis [[id number]]     Order Status: No result Specimen: Urine           Labs:     Estimated Creatinine Clearance: 95.7 mL/min (by C-G formula based on SCr of 0.68 mg/dL).  Recent Labs     10/10/23  2043   WBC 10.9*   NEUTSPOLYS 66.80     Recent Labs     10/10/23  2043   BUN 9   CREATININE 0.68   ALBUMIN 2.1*     No intake or output data in the 24 hours ending 10/10/23 2154   /52   Pulse 75   Temp 36.8 °C (98.2 °F) (Temporal)   Resp 18   Wt 81 kg (178 lb 9.2 oz)   SpO2 97%  Temp (24hrs), Av.8 °C (98.2 °F), Min:36.8 °C (98.2 °F), Max:36.8 °C (98.2 °F)    List concerns for Vancomycin clearance:     Malnutrition/Low albumin;Receipt of contrast dye    Pharmacokinetics:     AUC kinetics:   Ke (hr ^-1): 0.0741 hr^-1  Half life: 9.35 hr  Clearance: 3.901  Estimated TDD: 1950.5  Estimated Dose: 926  Estimated interval: 11.4    A/P:     -  Vancomycin dose: Start vancomycin 1000 mg IV Q12h (~12 mg/kg; due at 0200, 1400)    -  Next vancomycin level(s):    -To be determined    -  Predicted vancomycin AUC from initial AUC test calculator: 513 mg·hr/L    -  Comments: Tmax 98.2 with mild leukocytosis, lactic acid WNL, VSS. Renal indices within range of baseline compared to patient controls. No history of patient receiving vancomycin at this facility previously to guide dosing. MRSA PCR ordered per protocol to aid in de-escalation of therapy. Patient received vancomycin 1250 mg IV x 1 at outside hospital @ 1744, will give 750 mg IV x 1 to complete loading dose followed by ~12 mg/kg IV Q12h as outlined above. Will check peak/trough in 2 days. Pharmacy to monitor and adjust as needed.     Naila Pimentel, PharmD

## 2023-10-11 NOTE — PROGRESS NOTES
4 Eyes Skin Assessment Completed by CARIE Lee and CARIE Otero.    Head WDL  Ears WDL  Nose WDL  Mouth WDL  Neck WDL  Breast/Chest - left chest birthmark  Shoulder Blades Redness and Blanching  Spine Redness and Blanching  (R) Arm/Elbow/Hand WDL  (L) Arm/Elbow/Hand WDL  Abdomen WDL  Groin Redness, Bruising, and Incision, I & D incision  Scrotum/Coccyx/Buttocks Redness, Non-Blanching, and Excoriation  (R) Leg WDL  (L) Leg WDL  (R) Heel/Foot/Toe Redness and Boggy  (L) Heel/Foot/Toe Redness and Boggy          Devices In Places Tele Box, Pulse Ox, Vivas, SCD's, and Nasal Cannula      Interventions In Place Gray Ear Foams, Sacral Mepilex, Heel Float Boots, TAP System, Pillows, Q2 Turns, Barrier Cream, Dri-Bret Pads, and Pressure Redistribution Mattress    Possible Skin Injury Yes    Pictures Uploaded Into Epic Yes  Wound Consult Placed Yes  RN Wound Prevention Protocol Ordered Yes

## 2023-10-11 NOTE — H&P
Hospital Medicine History & Physical Note    Date of Service  10/10/2023    Primary Care Physician  Pcp Pt States None    Consultants  Gen Surg -  Mock     Code Status  Full Code    Chief Complaint  Necrotizing Soft tissue infection supra pubic area extending into perineum and labia      History of Presenting Illness  Fannie Driver is a 63 y.o. 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  Patient reports she had a wound above groin which started spontaneously draining purulent discharge.  She was given IV Zosyn and IV fluids prior to arrival however they recommended transfer given CT findings of possible gas gangrene/foreign years gangrene.  I have consulted general surgery who plans to take her for incision and drainage in OR tonight.  I have kept her n.p.o. and have ordered additional antibiotics including vancomycin and clindamycin.  Admitted to medicine service.    I discussed the plan of care with patient.    Review of Systems  Review of Systems   Constitutional:  Positive for chills.   Gastrointestinal:  Positive for abdominal pain.       Past Medical History   has a past medical history of Diabetes, Other specified symptom associated with female genital organs, and Personal history of venous thrombosis and embolism.    Surgical History   has a past surgical history that includes other abdominal surgery; cholecystectomy (10/09); ercp in or (10/30/2009); and dental osteotomy (N/A, 2/6/2023).     Family History  family history is not on file.   Family history reviewed with patient. There is no family history that is pertinent to the chief complaint.     Social History   reports that she has never smoked. She has never used smokeless tobacco. She reports that she does not currently use drugs.    Allergies  No Known Allergies    Medications  Prior to Admission  Medications   Prescriptions Last Dose Informant Patient Reported? Taking?   insulin GLARGINE (LANTUS,SEMGLEE) 100 UNIT/ML Solution   No No   Sig: Inject 30 Units under the skin 2 times a day.   insulin regular (HUMULIN R) 100 Unit/mL Solution   No No   Sig: Inject 2-9 Units under the skin 4 Times a Day,Before Meals and at Bedtime.   rivaroxaban (XARELTO) 10 MG Tab tablet   No No   Sig: Take 1 Tablet by mouth every day at 6 PM.      Facility-Administered Medications: None       Physical Exam                             Physical Exam  Vitals and nursing note reviewed.   Constitutional:       Appearance: Normal appearance.   HENT:      Head: Normocephalic and atraumatic.      Right Ear: Tympanic membrane normal.      Left Ear: Tympanic membrane normal.      Nose: Nose normal.      Mouth/Throat:      Mouth: Mucous membranes are moist.      Pharynx: Oropharynx is clear.   Eyes:      Extraocular Movements: Extraocular movements intact.      Pupils: Pupils are equal, round, and reactive to light.   Cardiovascular:      Rate and Rhythm: Normal rate and regular rhythm.      Pulses: Normal pulses.      Heart sounds: Normal heart sounds.   Pulmonary:      Effort: Pulmonary effort is normal.      Breath sounds: Normal breath sounds.   Abdominal:      General: Bowel sounds are normal. There is no distension.      Palpations: Abdomen is soft. There is no mass.      Tenderness: There is abdominal tenderness.      Comments: Suprapubic erythema and purulent drainage with fluctuance    Musculoskeletal:         General: Normal range of motion.      Cervical back: Neck supple.   Skin:     General: Skin is warm.      Capillary Refill: Capillary refill takes less than 2 seconds.   Neurological:      General: No focal deficit present.      Mental Status: She is alert and oriented to person, place, and time. Mental status is at baseline.   Psychiatric:         Mood and Affect: Mood normal.         Behavior: Behavior normal.  "        Laboratory:          No results for input(s): \"ALTSGPT\", \"ASTSGOT\", \"ALKPHOSPHAT\", \"TBILIRUBIN\", \"DBILIRUBIN\", \"GAMMAGT\", \"AMYLASE\", \"LIPASE\", \"ALB\", \"PREALBUMIN\", \"GLUCOSE\" in the last 72 hours.      No results for input(s): \"NTPROBNP\" in the last 72 hours.      No results for input(s): \"TROPONINT\" in the last 72 hours.    Imaging:  No orders to display     CT A/P From OSH   Large suprapubic abscess extending in both groins and IV with air tracking into.  Concern for urinary/necrotizing fasciitis.  Abscess measures 8.5 x 5 by Alberts with air tracking to perineum as well as both labia.  no X-Ray or EKG requiring interpretation    Assessment/Plan:  Justification for Admission Status  I anticipate this patient will require at least two midnights for appropriate medical management, necessitating inpatient admission because necrotizing SSTI requiring Incision and Drainange by general surgery and antibiotics.    Patient will need a Med/Surg bed on SURGICAL service .  The need is secondary to see above.    Necrotizing soft tissue infection- (present on admission)  Assessment & Plan  Keep NPO  Received IV Zosyn at OSH.  Add Vancomycin and Clindamycin   Check MRSA PCR   Repeat stat labs CBC, CMP, Blood cultures, Lactic acid, type and screen   IV Fluids     General surgery consulted - plan for I&D tonight     History of DVT (deep vein thrombosis)- (present on admission)  Assessment & Plan  Patient reports she intermittently takes xarelto. Unclear when last dose as patient is poor historian.    Diabetes mellitus (HCC)  Assessment & Plan  SSI         VTE prophylaxis: SCDs/TEDs  "

## 2023-10-11 NOTE — PROGRESS NOTES
4 Eyes Skin Assessment Completed by CARIE Fisher and CARIE Oglesby.    Head Dry  Ears WDL  Nose WDL  Mouth WDL  Neck WDL  Breast/Chest WDL  Shoulder Blades WDL  Spine WDL  (R) Arm/Elbow/Hand Redness, Blanching, Bruising, and Edema  (L) Arm/Elbow/Hand Redness, Blanching, Bruising, and Edema  Abdomen/Groin: lower abdomen/groin Large open surgical site, kerlix packing, ABD dressings in place. Held in place by panties.   Scrotum/Coccyx/Buttocks Redness, Blanching, and Excoriation, open wound between buttocks, Mepilex applied  (R) Leg Redness, Blanching, Scab, and Bruising  (L) Leg Redness, Blanching, Scab, and Bruising  (R) Heel/Foot/Toe Edema, dry  (L) Heel/Foot/Toe Edema, dry          Devices In Places Tele Box, Blood Pressure Cuff, Pulse Ox, Vivas, and SCD's      Interventions In Place Gray Ear Foams, Sacral Mepilex, TAP System, Pillows, Q2 Turns, and Barrier Cream    Possible Skin Injury Yes    Pictures Uploaded Into Epic No, needs to be completed  Wound Consult Placed Yes  RN Wound Prevention Protocol Ordered Yes

## 2023-10-11 NOTE — PROGRESS NOTES
4 Eyes Skin Assessment Completed by Phillip RN and LON RN.    Head Dry  Ears WDL  Nose WDL  Mouth Dry  Neck WDL  Breast/Chest WDL  Shoulder Blades WDL  Spine WDL  (R) Arm/Elbow/Hand Redness, Blanching, Bruising, and Edema  (L) Arm/Elbow/Hand Redness, Blanching, Bruising, and Edema  Abdomen WDL  Groin Red, blanching, multiple open draining wounds.   Scrotum/Coccyx/Buttocks Redness/Blanching, possible pressure injury   (R) Leg Redness, Blanching, Scab, and Bruising  (L) Leg Redness, Blanching, Scab, and Bruising  (R) Heel/Foot/Toe Edema,   (L) Heel/Foot/Toe Edema          Devices In Places Blood Pressure Cuff and Pulse Ox      Interventions In Place TAP System and Q2 Turns    Possible Skin Injury Yes    Pictures Uploaded Into Epic No, needs to be completed  Wound Consult Placed N/A  RN Wound Prevention Protocol Ordered No

## 2023-10-11 NOTE — ANESTHESIA PROCEDURE NOTES
Peripheral IV    Date/Time: 10/10/2023 10:38 PM    Performed by: Belkis Elder M.D.  Authorized by: Belkis Elder M.D.    Size:  18 G  Laterality:  Right  Peripheral IV Location:  Wrist  Site Prep:  Alcohol  Technique:  Direct puncture  Attempts:  1  Difficult IV necessitating physician skill: IV access difficult    Ultrasound Guidance: No

## 2023-10-11 NOTE — CONSULTS
DATE OF CONSULTATION:  10/10/2023     REFERRING PHYSICIAN:   Shayan Escobar M.D.     CONSULTING PHYSICIAN:  Praveen Chisholm M.D.     REASON FOR CONSULTATION:  I have been asked by  to see the patient in surgical consultation for evaluation of necrotizing soft tissue infection.    HISTORY OF PRESENT ILLNESS: The patient is a 63 year-old White woman who was transferred from [facility] for concern for a necrotizing soft tissue infection. She was recently admitted to [facility] from 9/23/23 to 9/27/23 for management of diabetic ketoacidosis, she was discharged on oral antibiotics for vulvar cellulitis. She has a history of a DVT but states she is no longer taking Xarelto. She endorses intermittent fevers and chills since her last admission. Her suprapubic region, primarily on the left was large and red prior to being at [facility], during her time there it began to spontaneously drain purulent material and the redness and swelling has began to subside. Labs at outside facility: WBC 12.1, Hgb 12.7, sodium 132, creatinine 0.50, glucose 145.    PAST MEDICAL HISTORY:  has a past medical history of Diabetes, Other specified symptom associated with female genital organs, and Personal history of venous thrombosis and embolism.    PAST SURGICAL HISTORY:  has a past surgical history that includes other abdominal surgery; cholecystectomy (10/09); ercp in or (10/30/2009); and dental osteotomy (N/A, 2/6/2023).    ALLERGIES: No Known Allergies    CURRENT MEDICATIONS:    Home Medications    **Home medications have not yet been reviewed for this encounter**         FAMILY HISTORY: reviewed and non-contributory.    SOCIAL HISTORY:  reports that she has never smoked. She has never used smokeless tobacco. She reports that she does not currently use drugs.    REVIEW OF SYSTEMS: Comprehensive review of systems is negative with the exception of the aforementioned HPI, PMH, and PSH bullets in accordance with  CMS guidelines.    PHYSICAL EXAMINATION:    Physical Exam  Vitals reviewed.   Constitutional:       General: She is not in acute distress.     Appearance: She is not toxic-appearing.   HENT:      Head: Normocephalic and atraumatic.      Right Ear: External ear normal.      Left Ear: External ear normal.      Nose: Nose normal.      Mouth/Throat:      Mouth: Mucous membranes are moist.      Pharynx: Oropharynx is clear.   Eyes:      General: No scleral icterus.     Pupils: Pupils are equal, round, and reactive to light.   Cardiovascular:      Rate and Rhythm: Normal rate and regular rhythm.   Pulmonary:      Effort: Pulmonary effort is normal. No respiratory distress.   Abdominal:      General: There is no distension.      Palpations: Abdomen is soft.      Tenderness: There is no abdominal tenderness. There is no guarding or rebound.   Genitourinary:     Comments: Induration of the left groin, more so inferomedially onto the labia. Fluctuance laterally with crepitus on the lower abdomen. Minimal erythema inferomedially with two openings with expressible purulent drainage with pressure on the left groin. Right groin soft and without erythema or crepitus.  Musculoskeletal:         General: No tenderness or deformity.      Cervical back: Neck supple.   Skin:     General: Skin is warm and dry.      Capillary Refill: Capillary refill takes less than 2 seconds.      Coloration: Skin is not jaundiced.   Neurological:      General: No focal deficit present.      Mental Status: She is alert.   Psychiatric:         Attention and Perception: Attention normal.         Mood and Affect: Mood normal.         Speech: Speech normal.         Behavior: Behavior normal. Behavior is cooperative.         LABORATORY VALUES:   Recent Labs     10/10/23  2043   WBC 10.9*   RBC 3.94*   HEMOGLOBIN 11.4*   HEMATOCRIT 35.7*   MCV 90.6   MCH 28.9   MCHC 31.9*   RDW 42.5   PLATELETCT 698*   MPV 8.9*     Recent Labs     10/10/23  2043   SODIUM 136    POTASSIUM 3.7   CHLORIDE 102   CO2 23   GLUCOSE 100*   BUN 9   CREATININE 0.68   CALCIUM 8.3*     Recent Labs     10/10/23  2043   ASTSGOT 21   ALTSGPT 11   TBILIRUBIN 0.5   ALKPHOSPHAT 165*   GLOBULIN 4.7*   INR 1.53*     Recent Labs     10/10/23  2043   INR 1.53*        IMAGING:   OUTSIDE IMAGES-DX CHEST   Final Result      OUTSIDE IMAGES-CT ABDOMEN /PELVIS   Final Result        Per report of outside CT 8.5 x 5 x 12 cm left lower-quadrant/groin abscess with air tracking into the perineum as well as both labia concerning for Annia's gangrene. On my review I agree with the read, large left groin/lower abdominal wall abscess with minimal extension of air in the subcutaneous tissues into the right labia and left perineum.    ASSESSMENT AND PLAN:     Necrotizing soft tissue infection- (present on admission)  Assessment & Plan  Subacute history of erythema and induration in the left groin/left lower quadrant.  Area of significant induration in the left medial groin with two areas spontaneously draining purulent material as well as fluctuance laterally. Erythema overlying the area of induration however crepitus superior to the areas of induration and fluctuance.  Labs without significant leukocytosis, hyponatremia, or acidosis.  Imaging unable to be reviewed at this time but per report large abscess in the left groin/lower abdominal wall with air tracking into the labia and perineum.  Given the time course and relatively normal labs favor chronic abscess with spontaneous drainage however the crepitus on exam and reported subcutaneous air on CT are concerning.  Will take for incision and drainage of left groin abscess with possible debridement of the bilateral groin, labia, perineum, and abdominal wall.  The risks, benefits, and alternatives to the procedure were discussed with the patient. Risks include, but are not limited to: bleeding; infection; the need for additional procedures; chronic wound; poor functional  or cosmetic outcome; as well as adverse cardiac and neurologic events. All of the patient's questions were answered and she agreed to proceed to the operating room.    History of DVT (deep vein thrombosis)- (present on admission)  Assessment & Plan  Patient denies recently taking Xarelto however our pharmacist here called her SNF and she is still taking her medication.  Will give PCC for reversal.        DISPOSITION: Medical evaluation and admission. The patient was admitted to the Medical Service prior to surgical consultation. [facility] Service will follow.     ____________________________________     Praveen Chisholm M.D.    DD: 10/10/2023  8:40 PM    AAST Grading System for EGS Conditions  ACS NSQIP Surgical Risk Calculator

## 2023-10-11 NOTE — OR SURGEON
Immediate Post OP Note    PreOp Diagnosis:  Necrotizing soft tissue infection      PostOp Diagnosis:  Necrotizing soft tissue infection      Procedure(s):  IRRIGATION AND DEBRIDEMENT, WOUND - Wound Class: Dirty or Infected    Surgeon(s):  Praveen Chisholm M.D.    Anesthesiologist/Type of Anesthesia:  Anesthesiologist: Belkis Elder M.D./General    Surgical Staff:  Circulator: Roz Monique R.N.  Scrub Person: Uche Medina  First Assist: Linsey M Wegener, A.P.R.N.    Specimens removed if any:  ID Type Source Tests Collected by Time Destination   1 : LEFT GROIN INFECTION Other Other AFB CULTURE, FUNGAL CULTURE, AEROBIC/ANAEROBIC CULTURE (SURGERY) Praveen Chisholm M.D. 10/10/2023 10:44 PM        Estimated Blood Loss: 200 mL    Findings: Abscess cavity in the left groin, pus tracking throughout the tissues medially across the midline overlying the pub symphysis, inferolaterally into the left labia, and superolaterally onto the left lower abdominal wall.    Complications: None    Plan: Continue IV antibiotics, adjust as needed according to culture results. Dakin's wet-to-dry dressing BID to left groin wound for next 24-hours. Plan for return to operating room for second look and possible further debridement vs wound vac placement 10/12.        10/11/2023 12:15 AM Praveen Chisholm M.D.

## 2023-10-11 NOTE — PROGRESS NOTES
[facility] Medicine Daily Progress Note    Date of Service  10/11/2023    Chief Complaint  Fannie Driver is a 63 y.o. female admitted 10/10/2023 with tissue infection.    Hospital Course  Fannie Driver is a 63 y.o. 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  Patient reports she had a wound above groin which started spontaneously draining purulent discharge.  She was given IV Zosyn and IV fluids prior to arrival however they recommended transfer given CT findings of possible gas gangrene/foreign years gangrene.  I have consulted general surgery who plans to take her for incision and drainage in OR tonight.  I have kept her n.p.o. and have ordered additional antibiotics including vancomycin and clindamycin.  Admitted to medicine service.    Interval Problem Update  No acute events overnight.  S/p debridement L groin, labia, abdominal wall yesterday.  Patient feeling well overall today, having surgical site related discomfort.  NPO at midnight, plan to return to OR tomorrow for more debridement and likely wound vac placement.  Continue IV antibiotics.  Patient states she normally takes 44U lantus daily at home. Start 20 U lantus daily for now as currently not eating much and plan for NPO status tonight, adjust insulin as needed. Continue sliding scale insulin.    I have discussed this patient's plan of care and discharge plan at IDT rounds today with Case Management, Nursing, Nursing leadership, and other members of the IDT team.    Consultants/Specialty  general surgery    Code Status  Full Code    Disposition  The patient is not medically cleared for discharge to home or a post-acute facility.      I have placed the appropriate orders for post-discharge needs.    Review of Systems  Review of Systems   Constitutional:  Negative for chills and fever.   Eyes:  Negative for  blurred vision and pain.   Respiratory:  Negative for cough and shortness of breath.    Cardiovascular:  Negative for chest pain, palpitations and leg swelling.   Gastrointestinal:  Negative for abdominal pain, nausea and vomiting.   Genitourinary:  Negative for dysuria and urgency.   Musculoskeletal:  Negative for back pain.   Skin:  Negative for itching and rash.   Neurological:  Negative for dizziness and headaches.   Psychiatric/Behavioral:  Negative for substance abuse. The patient does not have insomnia.         Physical Exam  Temp:  [35.9 °C (96.6 °F)-37.1 °C (98.8 °F)] 36.7 °C (98.1 °F)  Pulse:  [66-96] 88  Resp:  [12-24] 18  BP: ()/(49-68) 118/64  SpO2:  [93 %-100 %] 97 %    Physical Exam  Vitals reviewed.   Constitutional:       General: She is not in acute distress.     Appearance: She is not diaphoretic.   HENT:      Head: Normocephalic and atraumatic.      Right Ear: External ear normal.      Left Ear: External ear normal.      Nose: Nose normal. No congestion.      Mouth/Throat:      Pharynx: No oropharyngeal exudate or posterior oropharyngeal erythema.   Eyes:      Extraocular Movements: Extraocular movements intact.      Pupils: Pupils are equal, round, and reactive to light.   Cardiovascular:      Rate and Rhythm: Normal rate and regular rhythm.   Pulmonary:      Effort: Pulmonary effort is normal.      Breath sounds: Normal breath sounds.   Abdominal:      General: Bowel sounds are normal. There is no distension.      Palpations: Abdomen is soft.      Tenderness: There is no abdominal tenderness. There is no guarding.   Musculoskeletal:         General: Deformity present. No swelling. Normal range of motion.      Cervical back: Normal range of motion and neck supple.   Skin:     General: Skin is warm and dry.   Neurological:      General: No focal deficit present.      Mental Status: She is alert and oriented to person, place, and time.      Cranial Nerves: No cranial nerve deficit.       Sensory: No sensory deficit.      Motor: No weakness.   Psychiatric:         Mood and Affect: Mood normal.         Behavior: Behavior normal.         Fluids    Intake/Output Summary (Last 24 hours) at 10/11/2023 1416  Last data filed at 10/11/2023 1255  Gross per 24 hour   Intake 1000 ml   Output 675 ml   Net 325 ml       Laboratory  Recent Labs     10/10/23  2043   WBC 10.9*   RBC 3.94*   HEMOGLOBIN 11.4*   HEMATOCRIT 35.7*   MCV 90.6   MCH 28.9   MCHC 31.9*   RDW 42.5   PLATELETCT 698*   MPV 8.9*     Recent Labs     10/10/23  2043   SODIUM 136   POTASSIUM 3.7   CHLORIDE 102   CO2 23   GLUCOSE 100*   BUN 9   CREATININE 0.68   CALCIUM 8.3*     Recent Labs     10/10/23  2043   INR 1.53*               Imaging  OUTSIDE IMAGES-DX CHEST   Final Result      OUTSIDE IMAGES-CT ABDOMEN /PELVIS   Final Result           Assessment/Plan  Necrotizing soft tissue infection- (present on admission)  Assessment & Plan  S/p surgical debridement of left groin, abdominal wall, labia on 10/10  Continue IV antibiotics, follow up cultures  NPO at midnight, plan for return to OR tomorrow 10/12 with likely wound vac placement    History of DVT (deep vein thrombosis)- (present on admission)  Assessment & Plan  Patient reports she intermittently takes xarelto. Unclear when last dose as patient is poor historian.    Diabetes mellitus (HCC)- (present on admission)  Assessment & Plan  SSI   Start lantus 20 U daily; adjust as needed; reportedly taking 44U lantus daily at home in addition to sliding scale         VTE prophylaxis:   SCDs/TEDs

## 2023-10-11 NOTE — PROGRESS NOTES
DATE: 10/11/2023    Post Operative Day  1 incision and drainage of  left lower quadrant abscess .    INTERVAL EVENTS:  OR overnight for I&D of abdominal wall/suprapubic abscess.  Patient fatigued appearing and minimally interactive today.  Pain controlled, no surrounding erythema.      PHYSICAL EXAMINATION:  Vital Signs: /60   Pulse 78   Temp 36.4 °C (97.5 °F) (Temporal)   Resp 17   Wt 81 kg (178 lb 9.2 oz)   SpO2 98%   Physical Exam  Vitals reviewed.   Constitutional:       General: She is not in acute distress.     Appearance: She is not toxic-appearing.   HENT:      Head: Normocephalic and atraumatic.      Right Ear: External ear normal.      Left Ear: External ear normal.      Nose: Nose normal.      Mouth/Throat:      Mouth: Mucous membranes are moist.      Pharynx: Oropharynx is clear.   Eyes:      General: No scleral icterus.     Pupils: Pupils are equal, round, and reactive to light.   Cardiovascular:      Rate and Rhythm: Normal rate and regular rhythm.   Pulmonary:      Effort: Pulmonary effort is normal. No respiratory distress.   Abdominal:      General: There is no distension.      Palpations: Abdomen is soft.      Tenderness: There is no abdominal tenderness. There is no guarding or rebound.   Genitourinary:     Comments: Suprapubic/LLQ open wound s/p debridement, packed with betadine soaked gauze.  Hemostatic.  Minimal surrounding erythema  Musculoskeletal:         General: No tenderness or deformity.      Cervical back: Neck supple.   Skin:     General: Skin is warm and dry.      Capillary Refill: Capillary refill takes less than 2 seconds.      Coloration: Skin is not jaundiced.   Neurological:      General: No focal deficit present.      Mental Status: She is alert.   Psychiatric:         Attention and Perception: Attention normal.         Mood and Affect: Mood normal.         Speech: Speech normal.         Behavior: Behavior normal. Behavior is cooperative.         LABORATORY  What Type Of Note Output Would You Prefer (Optional)?: Standard Output Hpi Title: Evaluation of Skin Lesions VALUES:  Recent Labs     10/10/23  2043   WBC 10.9*   RBC 3.94*   HEMOGLOBIN 11.4*   HEMATOCRIT 35.7*   MCV 90.6   MCH 28.9   MCHC 31.9*   RDW 42.5   PLATELETCT 698*   MPV 8.9*     Recent Labs     10/10/23  2043   SODIUM 136   POTASSIUM 3.7   CHLORIDE 102   CO2 23   GLUCOSE 100*   BUN 9   CREATININE 0.68   CALCIUM 8.3*     Recent Labs     10/10/23  2043   ASTSGOT 21   ALTSGPT 11   TBILIRUBIN 0.5   ALKPHOSPHAT 165*   GLOBULIN 4.7*   INR 1.53*     Recent Labs     10/10/23  2043   INR 1.53*       IMAGING:  OUTSIDE IMAGES-DX CHEST   Final Result      OUTSIDE IMAGES-CT ABDOMEN /PELVIS   Final Result          ASSESSMENT AND PLAN:    Necrotizing soft tissue infection- (present on admission)  Assessment & Plan  10/11 I&D with betadine packing done    Return to OR for second look 10/12, if feasiable will place wound vac at that time  Continue antibiotics and tailor based on culture results  NPO at midnight for OR tomorrow    History of DVT (deep vein thrombosis)- (present on admission)  Assessment & Plan  Patient denies recently taking Xarelto however our pharmacist here called her SNF and she is still taking her medication.  Hold for OR tomorrow, if minimal or no ongoing debridement can restart 24 hours after             ____________________________________     Rj Gutiérrez M.D.    DD: 10/11/2023  9:11 AM     How Severe Are Your Spot(S)?: mild

## 2023-10-11 NOTE — ANESTHESIA PREPROCEDURE EVALUATION
Case: [id number] Date/Time: 10/10/23 2200    Procedure: IRRIGATION AND DEBRIDEMENT, WOUND (Left: Groin)    Location: [facility] OR 10 / SURGERY [facility]    Surgeons: Praveen Chisholm M.D.        62yo F with necrotizing groin infection. To OR for urgent I & D    Utpox positive for meth and oxycodone.  On Xarelto for DVTs, last taken yesterday, so PCC ordered for reversal    Relevant Problems   Other   (positive) Amphetamine use   (positive) Diabetes mellitus (HCC)   (positive) History of DVT (deep vein thrombosis)   (positive) Necrotizing soft tissue infection       Physical Exam    Airway   Mallampati: III  TM distance: >3 FB  Neck ROM: full       Cardiovascular - normal exam  Rhythm: regular  Rate: normal  (-) murmur     Dental   (+) upper dentures      Very poor dentition   Pulmonary - normal exam  Breath sounds clear to auscultation     Abdominal    Neurological - normal exam                 Anesthesia Plan    ASA 3- EMERGENT   ASA physical status 3 criteria: alcohol and/or substance dependence or abuseASA physical status emergent criteria: acute deteriorating condition due to infection and acutely contaminated wound or identified infection source    Plan - general       Airway plan will be LMA          Induction: intravenous    Postoperative Plan: Postoperative administration of opioids is intended.    Pertinent diagnostic labs and testing reviewed    Informed Consent:    Anesthetic plan and risks discussed with patient.    Use of blood products discussed with: patient whom consented to blood products.

## 2023-10-12 ENCOUNTER — ANESTHESIA EVENT (OUTPATIENT)
Dept: SURGERY | Facility: MEDICAL CENTER | Age: 63
DRG: 264 | End: 2023-10-12
Payer: MEDICARE

## 2023-10-12 ENCOUNTER — ANESTHESIA (OUTPATIENT)
Dept: SURGERY | Facility: MEDICAL CENTER | Age: 63
DRG: 264 | End: 2023-10-12
Payer: MEDICARE

## 2023-10-12 LAB
ANION GAP SERPL CALC-SCNC: 8 MMOL/L (ref 7–16)
ANION GAP SERPL CALC-SCNC: 9 MMOL/L (ref 7–16)
BUN SERPL-MCNC: 12 MG/DL (ref 8–22)
BUN SERPL-MCNC: 17 MG/DL (ref 8–22)
CALCIUM SERPL-MCNC: 8.2 MG/DL (ref 8.5–10.5)
CALCIUM SERPL-MCNC: 8.2 MG/DL (ref 8.5–10.5)
CHLORIDE SERPL-SCNC: 100 MMOL/L (ref 96–112)
CHLORIDE SERPL-SCNC: 105 MMOL/L (ref 96–112)
CO2 SERPL-SCNC: 23 MMOL/L (ref 20–33)
CO2 SERPL-SCNC: 23 MMOL/L (ref 20–33)
CREAT SERPL-MCNC: 0.67 MG/DL (ref 0.5–1.4)
CREAT SERPL-MCNC: 0.82 MG/DL (ref 0.5–1.4)
EKG IMPRESSION: NORMAL
ERYTHROCYTE [DISTWIDTH] IN BLOOD BY AUTOMATED COUNT: 43 FL (ref 35.9–50)
GFR SERPLBLD CREATININE-BSD FMLA CKD-EPI: 81 ML/MIN/1.73 M 2
GFR SERPLBLD CREATININE-BSD FMLA CKD-EPI: 99 ML/MIN/1.73 M 2
GLUCOSE BLD STRIP.AUTO-MCNC: 163 MG/DL (ref 65–99)
GLUCOSE BLD STRIP.AUTO-MCNC: 178 MG/DL (ref 65–99)
GLUCOSE BLD STRIP.AUTO-MCNC: 222 MG/DL (ref 65–99)
GLUCOSE BLD STRIP.AUTO-MCNC: 301 MG/DL (ref 65–99)
GLUCOSE BLD STRIP.AUTO-MCNC: 347 MG/DL (ref 65–99)
GLUCOSE SERPL-MCNC: 182 MG/DL (ref 65–99)
GLUCOSE SERPL-MCNC: 326 MG/DL (ref 65–99)
HCT VFR BLD AUTO: 27.9 % (ref 37–47)
HCT VFR BLD AUTO: 28.5 % (ref 37–47)
HGB BLD-MCNC: 9 G/DL (ref 12–16)
HGB BLD-MCNC: 9.1 G/DL (ref 12–16)
LACTATE SERPL-SCNC: 1.7 MMOL/L (ref 0.5–2)
MCH RBC QN AUTO: 29.8 PG (ref 27–33)
MCHC RBC AUTO-ENTMCNC: 32.6 G/DL (ref 32.2–35.5)
MCV RBC AUTO: 91.5 FL (ref 81.4–97.8)
PLATELET # BLD AUTO: 670 K/UL (ref 164–446)
PMV BLD AUTO: 8.9 FL (ref 9–12.9)
POTASSIUM SERPL-SCNC: 4.2 MMOL/L (ref 3.6–5.5)
POTASSIUM SERPL-SCNC: 4.2 MMOL/L (ref 3.6–5.5)
RBC # BLD AUTO: 3.05 M/UL (ref 4.2–5.4)
SODIUM SERPL-SCNC: 132 MMOL/L (ref 135–145)
SODIUM SERPL-SCNC: 136 MMOL/L (ref 135–145)
WBC # BLD AUTO: 12.4 K/UL (ref 4.8–10.8)

## 2023-10-12 PROCEDURE — 700111 HCHG RX REV CODE 636 W/ 250 OVERRIDE (IP): Mod: JZ | Performed by: STUDENT IN AN ORGANIZED HEALTH CARE EDUCATION/TRAINING PROGRAM

## 2023-10-12 PROCEDURE — 80048 BASIC METABOLIC PNL TOTAL CA: CPT

## 2023-10-12 PROCEDURE — 160048 HCHG OR STATISTICAL LEVEL 1-5: Performed by: STUDENT IN AN ORGANIZED HEALTH CARE EDUCATION/TRAINING PROGRAM

## 2023-10-12 PROCEDURE — 700102 HCHG RX REV CODE 250 W/ 637 OVERRIDE(OP): Performed by: STUDENT IN AN ORGANIZED HEALTH CARE EDUCATION/TRAINING PROGRAM

## 2023-10-12 PROCEDURE — 160002 HCHG RECOVERY MINUTES (STAT): Performed by: STUDENT IN AN ORGANIZED HEALTH CARE EDUCATION/TRAINING PROGRAM

## 2023-10-12 PROCEDURE — 700101 HCHG RX REV CODE 250: Performed by: ANESTHESIOLOGY

## 2023-10-12 PROCEDURE — 93005 ELECTROCARDIOGRAM TRACING: CPT | Performed by: STUDENT IN AN ORGANIZED HEALTH CARE EDUCATION/TRAINING PROGRAM

## 2023-10-12 PROCEDURE — 36415 COLL VENOUS BLD VENIPUNCTURE: CPT

## 2023-10-12 PROCEDURE — 700105 HCHG RX REV CODE 258: Performed by: STUDENT IN AN ORGANIZED HEALTH CARE EDUCATION/TRAINING PROGRAM

## 2023-10-12 PROCEDURE — 82962 GLUCOSE BLOOD TEST: CPT

## 2023-10-12 PROCEDURE — 160028 HCHG SURGERY MINUTES - 1ST 30 MINS LEVEL 3: Performed by: STUDENT IN AN ORGANIZED HEALTH CARE EDUCATION/TRAINING PROGRAM

## 2023-10-12 PROCEDURE — 85014 HEMATOCRIT: CPT

## 2023-10-12 PROCEDURE — A9270 NON-COVERED ITEM OR SERVICE: HCPCS | Performed by: STUDENT IN AN ORGANIZED HEALTH CARE EDUCATION/TRAINING PROGRAM

## 2023-10-12 PROCEDURE — 85018 HEMOGLOBIN: CPT

## 2023-10-12 PROCEDURE — 11043 DBRDMT MUSC&/FSCA 1ST 20/<: CPT | Performed by: STUDENT IN AN ORGANIZED HEALTH CARE EDUCATION/TRAINING PROGRAM

## 2023-10-12 PROCEDURE — 770001 HCHG ROOM/CARE - MED/SURG/GYN PRIV*

## 2023-10-12 PROCEDURE — 83605 ASSAY OF LACTIC ACID: CPT

## 2023-10-12 PROCEDURE — 93010 ELECTROCARDIOGRAM REPORT: CPT | Performed by: INTERNAL MEDICINE

## 2023-10-12 PROCEDURE — 160039 HCHG SURGERY MINUTES - EA ADDL 1 MIN LEVEL 3: Performed by: STUDENT IN AN ORGANIZED HEALTH CARE EDUCATION/TRAINING PROGRAM

## 2023-10-12 PROCEDURE — 700111 HCHG RX REV CODE 636 W/ 250 OVERRIDE (IP): Mod: JZ | Performed by: ANESTHESIOLOGY

## 2023-10-12 PROCEDURE — 99232 SBSQ HOSP IP/OBS MODERATE 35: CPT | Performed by: STUDENT IN AN ORGANIZED HEALTH CARE EDUCATION/TRAINING PROGRAM

## 2023-10-12 PROCEDURE — 85027 COMPLETE CBC AUTOMATED: CPT

## 2023-10-12 PROCEDURE — 0JB80ZZ EXCISION OF ABDOMEN SUBCUTANEOUS TISSUE AND FASCIA, OPEN APPROACH: ICD-10-PCS | Performed by: STUDENT IN AN ORGANIZED HEALTH CARE EDUCATION/TRAINING PROGRAM

## 2023-10-12 PROCEDURE — 11043 DBRDMT MUSC&/FSCA 1ST 20/<: CPT | Mod: AS | Performed by: NURSE PRACTITIONER

## 2023-10-12 PROCEDURE — 160009 HCHG ANES TIME/MIN: Performed by: STUDENT IN AN ORGANIZED HEALTH CARE EDUCATION/TRAINING PROGRAM

## 2023-10-12 PROCEDURE — 160035 HCHG PACU - 1ST 60 MINS PHASE I: Performed by: STUDENT IN AN ORGANIZED HEALTH CARE EDUCATION/TRAINING PROGRAM

## 2023-10-12 PROCEDURE — 700105 HCHG RX REV CODE 258: Performed by: ANESTHESIOLOGY

## 2023-10-12 PROCEDURE — 700105 HCHG RX REV CODE 258

## 2023-10-12 RX ORDER — HALOPERIDOL 5 MG/ML
1 INJECTION INTRAMUSCULAR
Status: DISCONTINUED | OUTPATIENT
Start: 2023-10-12 | End: 2023-10-12 | Stop reason: HOSPADM

## 2023-10-12 RX ORDER — OXYCODONE HCL 5 MG/5 ML
5 SOLUTION, ORAL ORAL
Status: COMPLETED | OUTPATIENT
Start: 2023-10-12 | End: 2023-10-12

## 2023-10-12 RX ORDER — SODIUM CHLORIDE, SODIUM LACTATE, POTASSIUM CHLORIDE, AND CALCIUM CHLORIDE .6; .31; .03; .02 G/100ML; G/100ML; G/100ML; G/100ML
250 INJECTION, SOLUTION INTRAVENOUS ONCE
Status: COMPLETED | OUTPATIENT
Start: 2023-10-12 | End: 2023-10-12

## 2023-10-12 RX ORDER — EPHEDRINE SULFATE 50 MG/ML
5 INJECTION, SOLUTION INTRAVENOUS
Status: DISCONTINUED | OUTPATIENT
Start: 2023-10-12 | End: 2023-10-12 | Stop reason: HOSPADM

## 2023-10-12 RX ORDER — HYDRALAZINE HYDROCHLORIDE 20 MG/ML
5 INJECTION INTRAMUSCULAR; INTRAVENOUS
Status: DISCONTINUED | OUTPATIENT
Start: 2023-10-12 | End: 2023-10-12 | Stop reason: HOSPADM

## 2023-10-12 RX ORDER — OXYCODONE HCL 5 MG/5 ML
10 SOLUTION, ORAL ORAL
Status: COMPLETED | OUTPATIENT
Start: 2023-10-12 | End: 2023-10-12

## 2023-10-12 RX ORDER — ONDANSETRON 2 MG/ML
4 INJECTION INTRAMUSCULAR; INTRAVENOUS
Status: DISCONTINUED | OUTPATIENT
Start: 2023-10-12 | End: 2023-10-12 | Stop reason: HOSPADM

## 2023-10-12 RX ORDER — ONDANSETRON 2 MG/ML
INJECTION INTRAMUSCULAR; INTRAVENOUS PRN
Status: DISCONTINUED | OUTPATIENT
Start: 2023-10-12 | End: 2023-10-12 | Stop reason: SURG

## 2023-10-12 RX ORDER — SODIUM CHLORIDE, SODIUM LACTATE, POTASSIUM CHLORIDE, CALCIUM CHLORIDE 600; 310; 30; 20 MG/100ML; MG/100ML; MG/100ML; MG/100ML
INJECTION, SOLUTION INTRAVENOUS CONTINUOUS
Status: DISCONTINUED | OUTPATIENT
Start: 2023-10-12 | End: 2023-10-12 | Stop reason: HOSPADM

## 2023-10-12 RX ORDER — SODIUM CHLORIDE, SODIUM LACTATE, POTASSIUM CHLORIDE, AND CALCIUM CHLORIDE .6; .31; .03; .02 G/100ML; G/100ML; G/100ML; G/100ML
1000 INJECTION, SOLUTION INTRAVENOUS ONCE
Status: COMPLETED | OUTPATIENT
Start: 2023-10-12 | End: 2023-10-12

## 2023-10-12 RX ORDER — SODIUM CHLORIDE, SODIUM LACTATE, POTASSIUM CHLORIDE, CALCIUM CHLORIDE 600; 310; 30; 20 MG/100ML; MG/100ML; MG/100ML; MG/100ML
INJECTION, SOLUTION INTRAVENOUS
Status: DISCONTINUED | OUTPATIENT
Start: 2023-10-12 | End: 2023-10-12 | Stop reason: SURG

## 2023-10-12 RX ORDER — LIDOCAINE HYDROCHLORIDE 20 MG/ML
INJECTION, SOLUTION EPIDURAL; INFILTRATION; INTRACAUDAL; PERINEURAL PRN
Status: DISCONTINUED | OUTPATIENT
Start: 2023-10-12 | End: 2023-10-12 | Stop reason: SURG

## 2023-10-12 RX ORDER — LABETALOL HYDROCHLORIDE 5 MG/ML
5 INJECTION, SOLUTION INTRAVENOUS
Status: DISCONTINUED | OUTPATIENT
Start: 2023-10-12 | End: 2023-10-12 | Stop reason: HOSPADM

## 2023-10-12 RX ORDER — HYDROMORPHONE HYDROCHLORIDE 1 MG/ML
0.1 INJECTION, SOLUTION INTRAMUSCULAR; INTRAVENOUS; SUBCUTANEOUS
Status: DISCONTINUED | OUTPATIENT
Start: 2023-10-12 | End: 2023-10-12 | Stop reason: HOSPADM

## 2023-10-12 RX ORDER — HYDROMORPHONE HYDROCHLORIDE 1 MG/ML
0.4 INJECTION, SOLUTION INTRAMUSCULAR; INTRAVENOUS; SUBCUTANEOUS
Status: DISCONTINUED | OUTPATIENT
Start: 2023-10-12 | End: 2023-10-12 | Stop reason: HOSPADM

## 2023-10-12 RX ORDER — DIPHENHYDRAMINE HYDROCHLORIDE 50 MG/ML
12.5 INJECTION INTRAMUSCULAR; INTRAVENOUS
Status: DISCONTINUED | OUTPATIENT
Start: 2023-10-12 | End: 2023-10-12 | Stop reason: HOSPADM

## 2023-10-12 RX ORDER — HYDROMORPHONE HYDROCHLORIDE 2 MG/ML
INJECTION, SOLUTION INTRAMUSCULAR; INTRAVENOUS; SUBCUTANEOUS PRN
Status: DISCONTINUED | OUTPATIENT
Start: 2023-10-12 | End: 2023-10-12 | Stop reason: SURG

## 2023-10-12 RX ORDER — HYDROMORPHONE HYDROCHLORIDE 1 MG/ML
0.2 INJECTION, SOLUTION INTRAMUSCULAR; INTRAVENOUS; SUBCUTANEOUS
Status: DISCONTINUED | OUTPATIENT
Start: 2023-10-12 | End: 2023-10-12 | Stop reason: HOSPADM

## 2023-10-12 RX ORDER — KETAMINE HYDROCHLORIDE 50 MG/ML
INJECTION, SOLUTION, CONCENTRATE INTRAMUSCULAR; INTRAVENOUS PRN
Status: DISCONTINUED | OUTPATIENT
Start: 2023-10-12 | End: 2023-10-12 | Stop reason: SURG

## 2023-10-12 RX ADMIN — SODIUM CHLORIDE, POTASSIUM CHLORIDE, SODIUM LACTATE AND CALCIUM CHLORIDE 250 ML: 600; 310; 30; 20 INJECTION, SOLUTION INTRAVENOUS at 22:45

## 2023-10-12 RX ADMIN — HYDROMORPHONE HYDROCHLORIDE 1 MG: 2 INJECTION INTRAMUSCULAR; INTRAVENOUS; SUBCUTANEOUS at 16:42

## 2023-10-12 RX ADMIN — SODIUM CHLORIDE, POTASSIUM CHLORIDE, SODIUM LACTATE AND CALCIUM CHLORIDE: 600; 310; 30; 20 INJECTION, SOLUTION INTRAVENOUS at 20:49

## 2023-10-12 RX ADMIN — FENTANYL CITRATE 50 MCG: 50 INJECTION, SOLUTION INTRAMUSCULAR; INTRAVENOUS at 16:14

## 2023-10-12 RX ADMIN — LIDOCAINE HYDROCHLORIDE 100 MG: 20 INJECTION, SOLUTION EPIDURAL; INFILTRATION; INTRACAUDAL at 15:57

## 2023-10-12 RX ADMIN — OXYCODONE HYDROCHLORIDE 10 MG: 5 SOLUTION ORAL at 16:54

## 2023-10-12 RX ADMIN — PROPOFOL 150 MG: 10 INJECTION, EMULSION INTRAVENOUS at 15:57

## 2023-10-12 RX ADMIN — FENTANYL CITRATE 25 MCG: 50 INJECTION, SOLUTION INTRAMUSCULAR; INTRAVENOUS at 17:21

## 2023-10-12 RX ADMIN — OXYCODONE 5 MG: 5 TABLET ORAL at 20:42

## 2023-10-12 RX ADMIN — PIPERACILLIN AND TAZOBACTAM 4.5 G: 4; .5 INJECTION, POWDER, FOR SOLUTION INTRAVENOUS at 11:46

## 2023-10-12 RX ADMIN — INSULIN HUMAN 2 UNITS: 100 INJECTION, SOLUTION PARENTERAL at 11:50

## 2023-10-12 RX ADMIN — FENTANYL CITRATE 25 MCG: 50 INJECTION, SOLUTION INTRAMUSCULAR; INTRAVENOUS at 17:04

## 2023-10-12 RX ADMIN — OXYCODONE 5 MG: 5 TABLET ORAL at 08:16

## 2023-10-12 RX ADMIN — PIPERACILLIN AND TAZOBACTAM 4.5 G: 4; .5 INJECTION, POWDER, FOR SOLUTION INTRAVENOUS at 04:53

## 2023-10-12 RX ADMIN — FENTANYL CITRATE 25 MCG: 50 INJECTION, SOLUTION INTRAMUSCULAR; INTRAVENOUS at 16:54

## 2023-10-12 RX ADMIN — DOCUSATE SODIUM 50 MG AND SENNOSIDES 8.6 MG 2 TABLET: 8.6; 5 TABLET, FILM COATED ORAL at 17:57

## 2023-10-12 RX ADMIN — HYDROMORPHONE HYDROCHLORIDE 1 MG: 2 INJECTION INTRAMUSCULAR; INTRAVENOUS; SUBCUTANEOUS at 16:25

## 2023-10-12 RX ADMIN — FENTANYL CITRATE 50 MCG: 50 INJECTION, SOLUTION INTRAMUSCULAR; INTRAVENOUS at 16:10

## 2023-10-12 RX ADMIN — ONDANSETRON 4 MG: 2 INJECTION INTRAMUSCULAR; INTRAVENOUS at 16:15

## 2023-10-12 RX ADMIN — SODIUM CHLORIDE, POTASSIUM CHLORIDE, SODIUM LACTATE AND CALCIUM CHLORIDE: 600; 310; 30; 20 INJECTION, SOLUTION INTRAVENOUS at 15:55

## 2023-10-12 RX ADMIN — SODIUM CHLORIDE, POTASSIUM CHLORIDE, SODIUM LACTATE AND CALCIUM CHLORIDE: 600; 310; 30; 20 INJECTION, SOLUTION INTRAVENOUS at 17:46

## 2023-10-12 RX ADMIN — SODIUM CHLORIDE, POTASSIUM CHLORIDE, SODIUM LACTATE AND CALCIUM CHLORIDE 1000 ML: 600; 310; 30; 20 INJECTION, SOLUTION INTRAVENOUS at 08:30

## 2023-10-12 RX ADMIN — LINEZOLID 600 MG: 600 TABLET, FILM COATED ORAL at 17:57

## 2023-10-12 RX ADMIN — INSULIN HUMAN 4 UNITS: 100 INJECTION, SOLUTION PARENTERAL at 05:58

## 2023-10-12 RX ADMIN — DOCUSATE SODIUM 50 MG AND SENNOSIDES 8.6 MG 2 TABLET: 8.6; 5 TABLET, FILM COATED ORAL at 04:54

## 2023-10-12 RX ADMIN — INSULIN HUMAN 1 UNITS: 100 INJECTION, SOLUTION PARENTERAL at 23:06

## 2023-10-12 RX ADMIN — LINEZOLID 600 MG: 600 TABLET, FILM COATED ORAL at 04:54

## 2023-10-12 RX ADMIN — FENTANYL CITRATE 25 MCG: 50 INJECTION, SOLUTION INTRAMUSCULAR; INTRAVENOUS at 17:11

## 2023-10-12 RX ADMIN — FENTANYL CITRATE 100 MCG: 50 INJECTION, SOLUTION INTRAMUSCULAR; INTRAVENOUS at 15:55

## 2023-10-12 RX ADMIN — PIPERACILLIN AND TAZOBACTAM 4.5 G: 4; .5 INJECTION, POWDER, FOR SOLUTION INTRAVENOUS at 20:23

## 2023-10-12 RX ADMIN — INSULIN HUMAN 1 UNITS: 100 INJECTION, SOLUTION PARENTERAL at 18:01

## 2023-10-12 RX ADMIN — Medication 25 MG: at 16:17

## 2023-10-12 ASSESSMENT — ENCOUNTER SYMPTOMS
DIZZINESS: 0
BLURRED VISION: 0
NAUSEA: 0
SHORTNESS OF BREATH: 0
FEVER: 0
BACK PAIN: 0
EYE PAIN: 0
VOMITING: 0
CHILLS: 0
COUGH: 0
HEADACHES: 0
INSOMNIA: 0
PALPITATIONS: 0
ABDOMINAL PAIN: 0

## 2023-10-12 ASSESSMENT — PAIN DESCRIPTION - PAIN TYPE
TYPE: ACUTE PAIN;SURGICAL PAIN
TYPE: ACUTE PAIN
TYPE: ACUTE PAIN;SURGICAL PAIN
TYPE: ACUTE PAIN;SURGICAL PAIN
TYPE: ACUTE PAIN

## 2023-10-12 ASSESSMENT — LIFESTYLE VARIABLES: SUBSTANCE_ABUSE: 0

## 2023-10-12 ASSESSMENT — PAIN SCALES - GENERAL: PAIN_LEVEL: 1

## 2023-10-12 NOTE — PROGRESS NOTES
Patient's blood pressure was 82/54 on manual BP reading. Patient complaining about pain.    This RN notified MD Panchito Bell about low BP.     Orders received for 1L LR bolus and to continue PO pain medications.     0940- Manual BP reading at 90/46.     Hourly rounding in place, Call light within reach.

## 2023-10-12 NOTE — PROGRESS NOTES
4 Eyes Skin Assessment Completed by CARIE Phillips and CARIE Muse.    Head WDL  Ears WDL  Nose WDL  Mouth WDL  Neck WDL  Breast/Chest WDL  Shoulder Blades WDL  Spine WDL  (R) Arm/Elbow/Hand Redness and Blanching  (L) Arm/Elbow/Hand Redness and Blanching  Abdomen Wound to lower abdomen, DIP not assessed under dressing  Groin Wound with DIP not assessed under  Scrotum/Coccyx/Buttocks Mepilex in place to sacrum, applied today Q72 dressing change ordered, not assessed underneath   (R) Leg WDL, flaky  (L) Leg WDL, flaky   (R) Heel/Foot/Toe Redness and Scab to top of foot  (L) Heel/Foot/Toe Heel mepilex in place, not assessed underneath           Devices In Places Tele Box, Pulse Ox, Vivas, and SCD's      Interventions In Place Heel Mepilex, Sacral Mepilex, Heel Float Boots, Waffle Overlay, TAP System, Pillows, Elbow Mepilex, Q2 Turns, and Pressure Redistribution Mattress    Possible Skin Injury Yes    Pictures Uploaded Into Epic Yes  Wound Consult Placed Yes  RN Wound Prevention Protocol Ordered Yes re

## 2023-10-12 NOTE — PROGRESS NOTES
"  DATE: 10/12/2023    Post Operative Day  2 incision and drainage of  left lower quadrant abscess .    INTERVAL EVENTS:  No major events, return to OR today      PHYSICAL EXAMINATION:  Vital Signs: BP 98/53   Pulse 68   Temp 36.6 °C (97.9 °F) (Temporal)   Resp 18   Ht 1.702 m (5' 7\")   Wt 81.5 kg (179 lb 10.8 oz)   SpO2 94%   Physical Exam  Vitals reviewed.   Constitutional:       General: She is not in acute distress.     Appearance: She is not toxic-appearing.   HENT:      Head: Normocephalic and atraumatic.      Right Ear: External ear normal.      Left Ear: External ear normal.      Nose: Nose normal.      Mouth/Throat:      Mouth: Mucous membranes are moist.      Pharynx: Oropharynx is clear.   Eyes:      General: No scleral icterus.     Pupils: Pupils are equal, round, and reactive to light.   Cardiovascular:      Rate and Rhythm: Normal rate and regular rhythm.   Pulmonary:      Effort: Pulmonary effort is normal. No respiratory distress.   Abdominal:      General: There is no distension.      Palpations: Abdomen is soft.      Tenderness: There is no abdominal tenderness. There is no guarding or rebound.   Genitourinary:     Comments: Suprapubic/LLQ open wound s/p debridement, packed with betadine soaked gauze.  Hemostatic.  Minimal surrounding erythema  Musculoskeletal:         General: No tenderness or deformity.      Cervical back: Neck supple.   Skin:     General: Skin is warm and dry.      Capillary Refill: Capillary refill takes less than 2 seconds.      Coloration: Skin is not jaundiced.   Neurological:      General: No focal deficit present.      Mental Status: She is alert.   Psychiatric:         Attention and Perception: Attention normal.         Mood and Affect: Mood normal.         Speech: Speech normal.         Behavior: Behavior normal. Behavior is cooperative.       Exam is unchanged from as documented on 10/11/13.    LABORATORY VALUES:  Recent Labs     10/10/23  2043 10/12/23  0253   WBC " 10.9* 12.4*   RBC 3.94* 3.05*   HEMOGLOBIN 11.4* 9.1*   HEMATOCRIT 35.7* 27.9*   MCV 90.6 91.5   MCH 28.9 29.8   MCHC 31.9* 32.6   RDW 42.5 43.0   PLATELETCT 698* 670*   MPV 8.9* 8.9*       Recent Labs     10/10/23  2043 10/12/23  0253   SODIUM 136 132*   POTASSIUM 3.7 4.2   CHLORIDE 102 100   CO2 23 23   GLUCOSE 100* 326*   BUN 9 17   CREATININE 0.68 0.82   CALCIUM 8.3* 8.2*       Recent Labs     10/10/23  2043   ASTSGOT 21   ALTSGPT 11   TBILIRUBIN 0.5   ALKPHOSPHAT 165*   GLOBULIN 4.7*   INR 1.53*       Recent Labs     10/10/23  2043   INR 1.53*         IMAGING:  OUTSIDE IMAGES-DX CHEST   Final Result      OUTSIDE IMAGES-CT ABDOMEN /PELVIS   Final Result          ASSESSMENT AND PLAN:    Necrotizing soft tissue infection- (present on admission)  Assessment & Plan  10/11 I&D with betadine packing done    Return to OR for second look 10/12, if feasiable will place wound vac at that time  Continue antibiotics and tailor based on culture results  NPO at midnight for OR tomorrow    History of DVT (deep vein thrombosis)- (present on admission)  Assessment & Plan  Patient denies recently taking Xarelto however our pharmacist here called her SNF and she is still taking her medication.  Hold for OR tomorrow, if minimal or no ongoing debridement can restart 24 hours after             ____________________________________     Rj Gutiérrez M.D.    DD: 10/11/2023  9:11 AM

## 2023-10-12 NOTE — ANESTHESIA TIME REPORT
Anesthesia Start and Stop Event Times     Date Time Event    10/12/2023 1507 Ready for Procedure     1555 Anesthesia Start     1644 Anesthesia Stop        Responsible Staff  10/12/23    Name Role Begin End    Vikash Martin M.D. Anesth 1540 1644        Overtime Reason:  no overtime (within assigned shift)    Comments: GOYO late call

## 2023-10-12 NOTE — OP REPORT
DATE OF OPERATION:   10/12/2023     PREOPERATIVE DIAGNOSIS: Left lower quadrant abdominal wound    POSTOPERATIVE DIAGNOSIS: Left lower quadrant abdominal wound    PROCEDURE PERFORMED:   1) Sharp, excisional debridement of skin, subcutaneous fat, and fascia 4x4 cm square  2) Partial delayed primary closure of chronic wound 8 cm length    SURGEON:    Rj Gutiérrez M.D.    ASSISTANT:    MCKENZIE Kincaid.     ANESTHESIOLOGIST:  Vikash Martin MD    ANESTHESIA:   Laryngeal mask anesthesia.    ASA CLASSIFICATION:  III    INDICATIONS: The patient is a 63 year-old woman with  chronic abscess cavity of the left lower abdomen and suprapubic area that underwent index debridement on 10/10/21  She is taken to the operating room for second look, possible debridement, and possible wound vac placement.     The nature of the surgical procedure warranted additional skilled operative assistance from an Advanced Registered Nurse Practitioner (ARNP). The assistant was present during the entire operation. The surgical assistant performed the following: provided assistance with optimal surgical exposure of the operative field and provided high complexity, subspecialty decision making input.     FINDINGS: viable wound cavity with scant areas of necrotic fat and fascia measuring 4x4 square cm    WOUND CLASSIFICATION:  Class IV, Dirty or Infected.    SPECIMEN:    None.    ESTIMATED BLOOD LOSS:  5 mL.    PROCEDURE: Following informed consent consent, the patient was properly identified, taken to the operating room and placed in supine position where general anesthesia was administered.  Her previously scheduled antibiotics were continued.  A palomares catheter was in place from earlier in her admission. Sequential compression devices were employed.  TWO rolls of Kerlix were removed from the prior operation and disposed of.  The abdomen was prepped and draped into a sterile field. A timeout was conducted with the full attention and  participation of all operating room personnel.    The wound cavity was explored.  No new fluid collections or loculations were identified.  The majority of the wound was viable.  A total of 4 x4 cm sq necrotic skin, subcutaneous fat, and fascia was excised sharply from the wound cavity.  The wound was copiously irrigated and hemostasis achieved with cautery.  The lateral edge of the wound was closed for a total of 8 cm in a delayed primary fashion with 3-0 Nylon vertical mattress sutures.  This was done to create a short tunnel to facilitate future wound care and eventual closure by secondary intent.  ONE roll of sterile saline soaked Kerlix gauze was then tunneled under this area of closure and used to cover the rest of the wound.  We considered placing a wound vac today but there is a small sinus into the left labia majora that would not be adequately sealed from the supine position.    The patient tolerated the procedure well, and there were no apparent complications. All sponge, needle, and instrument counts were correct on 2 separate occasions. The patient was awakened, extubated, and transferred to  to the post anesthesia care unit (PACU) in satisfactory condition.    PLAN  - No further surgical intervention  - Wound care consult for vac placement at bedside  - Daily dressing changes with sterile saline soaked kerlix until vac placement       ____________________________________     Rj Gutiérrez M.D.    DD: 10/12/2023  4:44 PM

## 2023-10-12 NOTE — ANESTHESIA POSTPROCEDURE EVALUATION
Patient: Fannie Driver    Procedure Summary     Date: 10/12/23 Room / Location: [place] [address] / SURGERY [facility]    Anesthesia Start: 1555 Anesthesia Stop: 1644    Procedure: SHARP EXCISION AND DEBRIDEMENT OF SKIN, SUBCUTANEUS AND FASCIA 10SQ CM, WOUND - ABDOMINAL WALL WOUND (Left: Abdomen) Diagnosis: (Necrotizing soft tissue infection)    Surgeons: Rj Gutiérrez M.D. Responsible Provider: Vikash Martin M.D.    Anesthesia Type: general ASA Status: 3          Final Anesthesia Type: general  Last vitals  BP   Blood Pressure: 97/48    Temp   36.2 °C (97.2 °F)    Pulse   65   Resp   16    SpO2   99 %      Anesthesia Post Evaluation    Patient location during evaluation: PACU  Patient participation: complete - patient participated  Level of consciousness: awake and alert  Pain score: 1    Airway patency: patent  Anesthetic complications: no  Cardiovascular status: hemodynamically stable  Respiratory status: acceptable  Hydration status: euvolemic    PONV: none          There were no known notable events for this encounter.     Nurse Pain Score: 4 (NPRS)

## 2023-10-12 NOTE — ANESTHESIA PREPROCEDURE EVALUATION
Case: [id number] Date/Time: 10/12/23 1424    Procedures:       IRRIGATION AND DEBRIDEMENT, WOUND - ABDOMINAL WALL WOUND      APPLICATION OR REPLACEMENT, WOUND VAC - POSSIBLE    Location: [facility] OR 14 / SURGERY [facility]    Surgeons: Rj Gutiérrez M.D.          Relevant Problems   No relevant active problems       Physical Exam    Airway   Mallampati: II  TM distance: >3 FB  Neck ROM: full       Cardiovascular - normal exam  Rhythm: regular  Rate: normal  (-) murmur     Dental - normal exam           Pulmonary - normal exam  Breath sounds clear to auscultation     Abdominal    Neurological - normal exam                 Anesthesia Plan    ASA 3   ASA physical status 3 criteria: alcohol and/or substance dependence or abuse    Plan - general       Airway plan will be LMA          Induction: intravenous    Postoperative Plan: Postoperative administration of opioids is intended.    Pertinent diagnostic labs and testing reviewed    Informed Consent:    Anesthetic plan and risks discussed with patient.    Use of blood products discussed with: patient whom consented to blood products.

## 2023-10-12 NOTE — PROGRESS NOTES
Report received from previous shift RN.  Assessment complete.  Patient resting in bed comfortably, even and unlabored breathing present.  Surgical incision to groin/pelvis with dressing in place, CDI.   Q2H turns and TAPS in place. Remote telemonitor in place.   All needs met at this time. Call light within reach. Hourly rounding in place.

## 2023-10-12 NOTE — CARE PLAN
The patient is Watcher - Medium risk of patient condition declining or worsening    Shift Goals  Clinical Goals: pain control, rest, skin integrity  Patient Goals: rest, pain control    Progress made toward(s) clinical / shift goals:  Pt pain managed with ice pack. Q 2 turns in effect to maintain skin integrity. Elbow mepilex to bilateral arms applied, 2RN skin assessment complete, no new injuries noted.   Problem: Pain - Standard  Goal: Alleviation of pain or a reduction in pain to the patient’s comfort goal  Description: Target End Date:  Prior to discharge or change in level of care    Document on Vitals flowsheet    1.  Document pain using the appropriate pain scale per order or unit policy  2.  Educate and implement non-pharmacologic comfort measures (i.e. relaxation, distraction, massage, cold/heat therapy, etc.)  3.  Pain management medications as ordered  4.  Reassess pain after pain med administration per policy  5.  If opiods administered assess patient's response to pain medication is appropriate per POSS sedation scale  6.  Follow pain management plan developed in collaboration with patient and interdisciplinary team (including palliative care or pain specialists if applicable)  Outcome: Progressing     Problem: Skin Integrity  Goal: Skin integrity is maintained or improved  Description: Target End Date:  Prior to discharge or change in level of care    Document interventions on Skin Risk/Rene flowsheet groups and corresponding LDA    1.  Assess and monitor skin integrity, appearance and/or temperature  2.  Assess risk factors for impaired skin integrity and/or pressures ulcers  3.  Implement precautions to protect skin integrity in collaboration with interdisciplinary team  4.  Implement pressure ulcer prevention protocol if at risk for skin breakdown  5.  Confirm wound care consult if at risk for skin breakdown  6.  Ensure patient use of pressure relieving devices  (Low air loss bed, waffle overlay,  heel protectors, ROHO cushion, etc)  Outcome: Progressing     Problem: Fall Risk  Goal: Patient will remain free from falls  Description: Target End Date:  Prior to discharge or change in level of care    Document interventions on the Tracy Regan Fall Risk Assessment    1.  Assess for fall risk factors  2.  Implement fall precautions  Outcome: Progressing        Patient is not progressing towards the following goals:

## 2023-10-12 NOTE — PROGRESS NOTES
Monitor Summary:    SR 70-84  With frequent PAC, rare PVC  First degree HB    .24/.08/.32

## 2023-10-12 NOTE — ANESTHESIA PROCEDURE NOTES
Airway    Date/Time: 10/12/2023 3:59 PM    Performed by: Vikash Martin M.D.  Authorized by: Vikash Martin M.D.    Location:  OR  Urgency:  Elective  Indications for Airway Management:  Anesthesia      Spontaneous Ventilation: absent    Sedation Level:  Deep  Preoxygenated: Yes    Final Airway Type:  Supraglottic airway  Final Supraglottic Airway:  Standard LMA    SGA Size:  4  Number of Attempts at Approach:  1

## 2023-10-12 NOTE — PROGRESS NOTES
Pt is A&O 4  Pain + ice pack provided   - nausea  Tolerating a CHO diet, will be NPO at midnight   Wound to suprapubic area DIP  - Drains  + Voids via palomares   - flatus  - BM  Up max assist, patient states she has not walked for a long time, even before admission   SCD's on  Bed alarm on, pt high fall risk per kasi shukla  Reviewed plan of care with patient, bed in lowest position and locked, pt resting comfortably now, call light within reach, all needs met at this time. Interventions will be executed per plan of care

## 2023-10-12 NOTE — DISCHARGE PLANNING
Care Transition Team Assessment    I went to visit Patient this am. She was resting in bed and stated was in pain. She had come from [facility]. Patient does not want to return there. States that she was there for a week. She would like to go to a SNF in [facility]. Her support system includes her Daughter and Son. Patient has a wheelchair and walker that she uses. Per MD Rounds, Patient may have a wound vac placed. I will provide Patient with SNF Choices when she is feeling better and not in so much pain.        Information Source Patient  Orientation Level: Oriented X4  Information Given By: Patient  Who is responsible for making decisions for patient? : Patient    Readmission Evaluation  Is this a readmission?: No    Elopement Risk  Legal Hold: No  Ambulatory or Self Mobile in Wheelchair: No-Not an Elopement Risk  Elopement Risk: Not at Risk for Elopement    Interdisciplinary Discharge Planning  Does Admitting Nurse Feel This Could be a Complex Discharge?: No  Primary Care Physician: None  Lives with - Patient's Self Care Capacity: Other (Comments)  Support Systems: Children  Housing / Facility: Skilled Nursing Facility  Name of Care Facility: [facility]  Do You Take your Prescribed Medications Regularly: Yes  Able to Return to Previous ADL's: Future Time w/Therapy  Mobility Issues: Yes  Prior Services: Continuous (24 Hour) Care Giving Per Service  Patient Prefers to be Discharged to:: [facility]  Assistance Needed: Yes  Durable Medical Equipment: Walker    Discharge Preparedness  What is your plan after discharge?: Skilled nursing facility  What are your discharge supports?: Child  Prior Functional Level: Uses Wheelchair  Difficulity with ADLs: Bathing, Toileting, Walking    Functional Assesment  Prior Functional Level: Uses Wheelchair    Finances  Prescription Coverage: Yes    Vision / Hearing Impairment  Vision Impairment : No  Hearing Impairment : No              Domestic  Abuse  Have you ever been the victim of abuse or violence?: No  Physical Abuse or Sexual Abuse: No  Verbal Abuse or Emotional Abuse: No  Possible Abuse/Neglect Reported to:: Not Applicable              Anticipated Discharge Information  Discharge Disposition: Still a Patient (30)  Discharge Contact Phone Number: [phone]

## 2023-10-12 NOTE — PROGRESS NOTES
Hospital Medicine Daily Progress Note    Date of Service  10/12/2023    Chief Complaint  Fannie Driver is a 63 y.o. female admitted 10/10/2023 with tissue infection.    Hospital Course  Fannie Driver is a 63 y.o. 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  Patient reports she had a wound above groin which started spontaneously draining purulent discharge.  She was given IV Zosyn and IV fluids prior to arrival however they recommended transfer given CT findings of possible gas gangrene/foreign years gangrene.  I have consulted general surgery who plans to take her for incision and drainage in OR tonight.  I have kept her n.p.o. and have ordered additional antibiotics including vancomycin and clindamycin.  Admitted to medicine service.    Interval Problem Update  No acute events overnight.  Patient feeling well, reports groin pain by surgical site.  NPO, plan for return to OR today for further debridement and possible wound vac placement.  Appreciate surgery recommendations and plan of care.  Continue IV antibiotics.  Blood glucose elevated, increase lantus to 30 U daily, continue sliding scale, adjust as needed.  PT/OT eval post op when cleared by surgery, likely will go to SNF.    I have discussed this patient's plan of care and discharge plan at IDT rounds today with Case Management, Nursing, Nursing leadership, and other members of the IDT team.    Consultants/Specialty  general surgery    Code Status  Full Code    Disposition  The patient is not medically cleared for discharge to home or a post-acute facility.  Anticipate discharge to: skilled nursing facility    I have placed the appropriate orders for post-discharge needs.    Review of Systems  Review of Systems   Constitutional:  Negative for chills and fever.   Eyes:  Negative for blurred vision and pain.    Respiratory:  Negative for cough and shortness of breath.    Cardiovascular:  Negative for chest pain, palpitations and leg swelling.   Gastrointestinal:  Negative for abdominal pain, nausea and vomiting.   Genitourinary:  Negative for dysuria and urgency.   Musculoskeletal:  Negative for back pain.   Skin:  Negative for itching and rash.   Neurological:  Negative for dizziness and headaches.   Psychiatric/Behavioral:  Negative for substance abuse. The patient does not have insomnia.         Physical Exam  Temp:  [36.3 °C (97.3 °F)-36.8 °C (98.2 °F)] 36.6 °C (97.9 °F)  Pulse:  [68-89] 68  Resp:  [17-19] 18  BP: ()/(41-53) (P) 98/53  SpO2:  [94 %-96 %] 94 %    Physical Exam  Vitals reviewed.   Constitutional:       General: She is not in acute distress.     Appearance: She is not diaphoretic.   HENT:      Head: Normocephalic and atraumatic.      Right Ear: External ear normal.      Left Ear: External ear normal.      Nose: Nose normal. No congestion.      Mouth/Throat:      Pharynx: No oropharyngeal exudate or posterior oropharyngeal erythema.   Eyes:      Extraocular Movements: Extraocular movements intact.      Pupils: Pupils are equal, round, and reactive to light.   Cardiovascular:      Rate and Rhythm: Normal rate and regular rhythm.   Pulmonary:      Effort: Pulmonary effort is normal.      Breath sounds: Normal breath sounds.   Abdominal:      General: Bowel sounds are normal. There is no distension.      Palpations: Abdomen is soft.      Tenderness: There is no abdominal tenderness. There is no guarding.   Musculoskeletal:         General: Deformity present. No swelling. Normal range of motion.      Cervical back: Normal range of motion and neck supple.   Skin:     General: Skin is warm and dry.   Neurological:      General: No focal deficit present.      Mental Status: She is alert and oriented to person, place, and time.      Cranial Nerves: No cranial nerve deficit.      Sensory: No sensory  deficit.      Motor: No weakness.   Psychiatric:         Mood and Affect: Mood normal.         Behavior: Behavior normal.         Fluids    Intake/Output Summary (Last 24 hours) at 10/12/2023 1220  Last data filed at 10/12/2023 0939  Gross per 24 hour   Intake 210 ml   Output 1475 ml   Net -1265 ml       Laboratory  Recent Labs     10/10/23  2043 10/12/23  0253   WBC 10.9* 12.4*   RBC 3.94* 3.05*   HEMOGLOBIN 11.4* 9.1*   HEMATOCRIT 35.7* 27.9*   MCV 90.6 91.5   MCH 28.9 29.8   MCHC 31.9* 32.6   RDW 42.5 43.0   PLATELETCT 698* 670*   MPV 8.9* 8.9*     Recent Labs     10/10/23  2043 10/12/23  0253   SODIUM 136 132*   POTASSIUM 3.7 4.2   CHLORIDE 102 100   CO2 23 23   GLUCOSE 100* 326*   BUN 9 17   CREATININE 0.68 0.82   CALCIUM 8.3* 8.2*     Recent Labs     10/10/23  2043   INR 1.53*               Imaging  OUTSIDE IMAGES-DX CHEST   Final Result      OUTSIDE IMAGES-CT ABDOMEN /PELVIS   Final Result           Assessment/Plan  Necrotizing soft tissue infection- (present on admission)  Assessment & Plan  S/p surgical debridement of left groin, abdominal wall, labia on 10/10  Continue IV antibiotics, follow up cultures  NPO, plan for return to OR today for further debridement 10/12    History of DVT (deep vein thrombosis)- (present on admission)  Assessment & Plan  Patient reports she intermittently takes xarelto. Unclear when last dose as patient is poor historian.    Diabetes mellitus (HCC)- (present on admission)  Assessment & Plan  SSI   Start lantus 20 U daily; adjust as needed; reportedly taking 44U lantus daily at home in addition to sliding scale  Increase lantus to 30 U daily, monitor         VTE prophylaxis:   SCDs/TEDs

## 2023-10-13 LAB
ANION GAP SERPL CALC-SCNC: 8 MMOL/L (ref 7–16)
BUN SERPL-MCNC: 9 MG/DL (ref 8–22)
CALCIUM SERPL-MCNC: 8.1 MG/DL (ref 8.5–10.5)
CHLORIDE SERPL-SCNC: 104 MMOL/L (ref 96–112)
CO2 SERPL-SCNC: 24 MMOL/L (ref 20–33)
CORTIS SERPL-MCNC: 16.2 UG/DL (ref 0–23)
CREAT SERPL-MCNC: 0.61 MG/DL (ref 0.5–1.4)
ERYTHROCYTE [DISTWIDTH] IN BLOOD BY AUTOMATED COUNT: 43.4 FL (ref 35.9–50)
GFR SERPLBLD CREATININE-BSD FMLA CKD-EPI: 101 ML/MIN/1.73 M 2
GLUCOSE BLD STRIP.AUTO-MCNC: 120 MG/DL (ref 65–99)
GLUCOSE BLD STRIP.AUTO-MCNC: 173 MG/DL (ref 65–99)
GLUCOSE BLD STRIP.AUTO-MCNC: 189 MG/DL (ref 65–99)
GLUCOSE SERPL-MCNC: 153 MG/DL (ref 65–99)
HCT VFR BLD AUTO: 29.5 % (ref 37–47)
HGB BLD-MCNC: 9.3 G/DL (ref 12–16)
MCH RBC QN AUTO: 28.9 PG (ref 27–33)
MCHC RBC AUTO-ENTMCNC: 31.5 G/DL (ref 32.2–35.5)
MCV RBC AUTO: 91.6 FL (ref 81.4–97.8)
PLATELET # BLD AUTO: 668 K/UL (ref 164–446)
PMV BLD AUTO: 8.8 FL (ref 9–12.9)
POTASSIUM SERPL-SCNC: 3.9 MMOL/L (ref 3.6–5.5)
RBC # BLD AUTO: 3.22 M/UL (ref 4.2–5.4)
SODIUM SERPL-SCNC: 136 MMOL/L (ref 135–145)
WBC # BLD AUTO: 10.9 K/UL (ref 4.8–10.8)

## 2023-10-13 PROCEDURE — 302098 PASTE RING (FLAT): Performed by: STUDENT IN AN ORGANIZED HEALTH CARE EDUCATION/TRAINING PROGRAM

## 2023-10-13 PROCEDURE — 82962 GLUCOSE BLOOD TEST: CPT | Mod: 91

## 2023-10-13 PROCEDURE — 700105 HCHG RX REV CODE 258: Performed by: STUDENT IN AN ORGANIZED HEALTH CARE EDUCATION/TRAINING PROGRAM

## 2023-10-13 PROCEDURE — 700102 HCHG RX REV CODE 250 W/ 637 OVERRIDE(OP): Performed by: STUDENT IN AN ORGANIZED HEALTH CARE EDUCATION/TRAINING PROGRAM

## 2023-10-13 PROCEDURE — 85027 COMPLETE CBC AUTOMATED: CPT

## 2023-10-13 PROCEDURE — 700111 HCHG RX REV CODE 636 W/ 250 OVERRIDE (IP): Mod: JZ | Performed by: STUDENT IN AN ORGANIZED HEALTH CARE EDUCATION/TRAINING PROGRAM

## 2023-10-13 PROCEDURE — A9270 NON-COVERED ITEM OR SERVICE: HCPCS | Performed by: STUDENT IN AN ORGANIZED HEALTH CARE EDUCATION/TRAINING PROGRAM

## 2023-10-13 PROCEDURE — 80048 BASIC METABOLIC PNL TOTAL CA: CPT

## 2023-10-13 PROCEDURE — 82533 TOTAL CORTISOL: CPT

## 2023-10-13 PROCEDURE — 99232 SBSQ HOSP IP/OBS MODERATE 35: CPT | Performed by: STUDENT IN AN ORGANIZED HEALTH CARE EDUCATION/TRAINING PROGRAM

## 2023-10-13 PROCEDURE — 99232 SBSQ HOSP IP/OBS MODERATE 35: CPT | Performed by: NURSE PRACTITIONER

## 2023-10-13 PROCEDURE — 770001 HCHG ROOM/CARE - MED/SURG/GYN PRIV*

## 2023-10-13 PROCEDURE — 99223 1ST HOSP IP/OBS HIGH 75: CPT | Mod: GC | Performed by: INTERNAL MEDICINE

## 2023-10-13 PROCEDURE — 36415 COLL VENOUS BLD VENIPUNCTURE: CPT

## 2023-10-13 RX ORDER — LIDOCAINE HYDROCHLORIDE 20 MG/ML
1 JELLY TOPICAL
Status: DISCONTINUED | OUTPATIENT
Start: 2023-10-13 | End: 2023-10-17 | Stop reason: HOSPADM

## 2023-10-13 RX ORDER — MORPHINE SULFATE 4 MG/ML
3 INJECTION INTRAVENOUS ONCE
Status: ACTIVE | OUTPATIENT
Start: 2023-10-13 | End: 2023-10-14

## 2023-10-13 RX ORDER — LIDOCAINE HYDROCHLORIDE 40 MG/ML
SOLUTION TOPICAL
Status: DISCONTINUED | OUTPATIENT
Start: 2023-10-13 | End: 2023-10-17 | Stop reason: HOSPADM

## 2023-10-13 RX ORDER — LIDOCAINE HYDROCHLORIDE 20 MG/ML
20 INJECTION, SOLUTION INFILTRATION; PERINEURAL
Status: DISCONTINUED | OUTPATIENT
Start: 2023-10-13 | End: 2023-10-17 | Stop reason: HOSPADM

## 2023-10-13 RX ADMIN — LINEZOLID 600 MG: 600 TABLET, FILM COATED ORAL at 17:03

## 2023-10-13 RX ADMIN — PIPERACILLIN AND TAZOBACTAM 4.5 G: 4; .5 INJECTION, POWDER, FOR SOLUTION INTRAVENOUS at 12:11

## 2023-10-13 RX ADMIN — OXYCODONE 5 MG: 5 TABLET ORAL at 20:21

## 2023-10-13 RX ADMIN — LINEZOLID 600 MG: 600 TABLET, FILM COATED ORAL at 05:27

## 2023-10-13 RX ADMIN — POLYETHYLENE GLYCOL 3350 1 PACKET: 17 POWDER, FOR SOLUTION ORAL at 05:27

## 2023-10-13 RX ADMIN — OXYCODONE 5 MG: 5 TABLET ORAL at 09:45

## 2023-10-13 RX ADMIN — PIPERACILLIN AND TAZOBACTAM 4.5 G: 4; .5 INJECTION, POWDER, FOR SOLUTION INTRAVENOUS at 20:22

## 2023-10-13 RX ADMIN — PIPERACILLIN AND TAZOBACTAM 4.5 G: 4; .5 INJECTION, POWDER, FOR SOLUTION INTRAVENOUS at 05:28

## 2023-10-13 RX ADMIN — SODIUM CHLORIDE, POTASSIUM CHLORIDE, SODIUM LACTATE AND CALCIUM CHLORIDE: 600; 310; 30; 20 INJECTION, SOLUTION INTRAVENOUS at 18:37

## 2023-10-13 RX ADMIN — DOCUSATE SODIUM 50 MG AND SENNOSIDES 8.6 MG 2 TABLET: 8.6; 5 TABLET, FILM COATED ORAL at 05:27

## 2023-10-13 RX ADMIN — MORPHINE SULFATE 3 MG: 4 INJECTION, SOLUTION INTRAMUSCULAR; INTRAVENOUS at 13:02

## 2023-10-13 RX ADMIN — INSULIN HUMAN 1 UNITS: 100 INJECTION, SOLUTION PARENTERAL at 12:13

## 2023-10-13 RX ADMIN — OXYCODONE 5 MG: 5 TABLET ORAL at 05:32

## 2023-10-13 RX ADMIN — RIVAROXABAN 10 MG: 10 TABLET, FILM COATED ORAL at 17:03

## 2023-10-13 RX ADMIN — INSULIN HUMAN 1 UNITS: 100 INJECTION, SOLUTION PARENTERAL at 17:00

## 2023-10-13 RX ADMIN — OXYCODONE 5 MG: 5 TABLET ORAL at 14:59

## 2023-10-13 RX ADMIN — DOCUSATE SODIUM 50 MG AND SENNOSIDES 8.6 MG 2 TABLET: 8.6; 5 TABLET, FILM COATED ORAL at 17:03

## 2023-10-13 ASSESSMENT — ENCOUNTER SYMPTOMS
PALPITATIONS: 0
BACK PAIN: 0
EYE PAIN: 0
ABDOMINAL PAIN: 0
HEADACHES: 0
SHORTNESS OF BREATH: 0
NAUSEA: 0
BLURRED VISION: 0
CHILLS: 0
VOMITING: 0
DIZZINESS: 0
INSOMNIA: 0
COUGH: 0
FEVER: 0

## 2023-10-13 ASSESSMENT — PAIN DESCRIPTION - PAIN TYPE
TYPE: ACUTE PAIN
TYPE: ACUTE PAIN

## 2023-10-13 ASSESSMENT — LIFESTYLE VARIABLES: SUBSTANCE_ABUSE: 0

## 2023-10-13 NOTE — CONSULTS
INFECTIOUS DISEASES INPATIENT CONSULT NOTE     Date of Service: 10/13/2023    Consult Requested By: Panchito Bell M.D.    Reason for Consultation: Necrotizing infection on left groin s/p  I&D on 10/10 and 10/12  History of Present Illness:   Fannie Driver is a 63 y.o. 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     From chart review, Patient was recently admitted on 9/23/23 for DKA. During the last admission, she was diagnosed vulvar celluitis and treated with Unasyn/augmentin. She completed her abx treatment and discharged to SNF on 9/27/23. Per patient, her infection on left groin was getting worse and SNF applied warm compression but did not resolve. She also had on and off fever and chills. Her symptoms were getting worse so she was sent to [facility].     At [facility], CBC presented leukocytosis of 12.1. CT presented large suprapubic abscess estening in both groins and air, concerning for uniray/necrotizing fasciitis. She was given IV Zosyn(10/10-) and IV fluid and transferred to [facility] for surgical intervention.     On the floor, VS remarkable for hypotension of 98/51 but afebrile. Lactic acid was 1.4. Vancomycin(10/11-10/11) and clindamycin(10/10-10/11) were added but discontinued and transitioned to linezolid (10/11-) . Ortho surgeon I&D on 10/10 and 10/12 (partial delayed primary closure performed on 10/12). Wound culture presented Staph aureus growth with moderate urogenital johnie including enteric johnie. Currently, patient has been treating with Zosyn and linezolid.     No acute distress overnight. Patient endorses significant pain on left groin area.  She stated that she feels feverish sometimes but does not remember when the last time was. She has been hemodynamically stable past two days, afebrile.     Endorses  nausea and constipation    Denies chest pain, palpitation, SOB, and vomiting     Leukocytosis trended down to 10.9 from 12.4    Extensive review of emergency physician notes, hospital medicine notes and consultant notes performed.    All other review of systems reviewed and negative except those documented above in the HPI.     PMH:   Past Medical History:   Diagnosis Date    Diabetes     Other specified symptom associated with female genital organs     tubal ligation    Personal history of venous thrombosis and embolism     left leg after tubal       PSH:  Past Surgical History:   Procedure Laterality Date    IRRIGATION & DEBRIDEMENT GENERAL Left 10/12/2023    Procedure: SHARP EXCISION AND DEBRIDEMENT OF SKIN, SUBCUTANEUS AND FASCIA 10SQ CM, WOUND - ABDOMINAL WALL WOUND;  Surgeon: Rj Gutiérrez M.D.;  Location: SURGERY [facility];  Service: General    WOUND CLOSURE NEURO Left 10/12/2023    Procedure: PARTIAL DELAYED CLOSURE 8CM, WOUND;  Surgeon: Rj Gutiérrez M.D.;  Location: SURGERY [facility];  Service: General    IRRIGATION & DEBRIDEMENT [facility] Left 10/10/2023    Procedure: IRRIGATION AND DEBRIDEMENT, WOUND;  Surgeon: Praveen Chisholm M.D.;  Location: SURGERY [facility];  Service: Trauma    DENTAL OSTEOTOMY N/A 2/6/2023    Procedure: I&D OF LEFT BUCCAL ABSCESS; EXTRACTION OF TEETH;  Surgeon: Lore Dukes D.D.S.;  Location: SURGERY SAME DAY [facility];  Service: Oral Surgery    ERCP IN OR  10/30/2009    Performed by CESAR BATES at [facility] ORS    CHOLECYSTECTOMY  10/09    OTHER ABDOMINAL SURGERY      tubal ligation       FAMILY HX:  History reviewed. No pertinent family history.    SOCIAL HX:  Social History     Socioeconomic History    Marital status: Single     Spouse name: Not on file    Number of children: Not on file    Years of education: Not on file    Highest education level: Not on file   Occupational History    Not on file   Tobacco Use    Smoking status: Never    Smokeless  tobacco: Never   Vaping Use    Vaping Use: Never used   Substance and Sexual Activity    Alcohol use: Not on file    Drug use: Not Currently    Sexual activity: Not on file   Other Topics Concern    Not on file   Social History Narrative    Not on file     Social Determinants of Health     Financial Resource Strain: Not on file   Food Insecurity: Not on file   Transportation Needs: Not on file   Physical Activity: Not on file   Stress: Not on file   Social Connections: Not on file   Intimate Partner Violence: Not on file   Housing Stability: Not on file     Social History     Tobacco Use   Smoking Status Never   Smokeless Tobacco Never     Social History     Substance and Sexual Activity   Alcohol Use None       Allergies/Intolerances:  No Known Allergies    History reviewed with the patient = Yes    Other Current Medications:    Current Facility-Administered Medications:     lidocaine jelly (Uro-Jet) 2 % 1 Application, 1 Application, Topical, QDAY PRN, Panchito Bell M.D.    lidocaine (Xylocaine) 2 % injection 20 mL, 20 mL, Topical, QDAY PRN **OR** lidocaine (Xylocaine) 4 % topical solution, , Topical, QDAY PRN, Panchito Bell M.D.    insulin GLARGINE (Lantus,Semglee) injection, 30 Units, Subcutaneous, Q EVENING, Pnachito Bell M.D., 30 Units at 10/12/23 1801    oxyCODONE immediate-release (Roxicodone) tablet 5 mg, 5 mg, Oral, Q4HRS PRN, Panchito Bell M.D., 5 mg at 10/13/23 0945    morphine 4 MG/ML injection 3 mg, 3 mg, Intravenous, Q4HRS PRN, Panchito Bell M.D.    linezolid (Zyvox) tablet 600 mg, 600 mg, Oral, Q12HRS, Panchito Bell M.D., 600 mg at 10/13/23 0527    senna-docusate (Pericolace Or Senokot S) 8.6-50 MG per tablet 2 Tablet, 2 Tablet, Oral, BID, 2 Tablet at 10/13/23 0527 **AND** polyethylene glycol/lytes (Miralax) PACKET 1 Packet, 1 Packet, Oral, QDAY PRN, 1 Packet at 10/13/23 0527 **AND** magnesium hydroxide (Milk Of Magnesia) suspension 30 mL, 30 mL, Oral, QDAY PRN **AND** bisacodyl (Dulcolax) suppository  "10 mg, 10 mg, Rectal, QDAY PRN, Shayan Escobar M.D.    lactated ringers infusion, , Intravenous, Continuous, Panchito Bell M.D., Last Rate: 75 mL/hr at 10/12/23 2049, New Bag at 10/12/23 2049    insulin regular (HumuLIN R,NovoLIN R) injection, 1-6 Units, Subcutaneous, Q6HRS, 1 Units at 10/12/23 2306 **AND** POC blood glucose manual result, , , Q6H **AND** NOTIFY MD and PharmD, , , Once **AND** Administer 20 grams of glucose (approximately 8 ounces of fruit juice) every 15 minutes PRN FSBG less than 70 mg/dL, , , PRN **AND** dextrose 50% (D50W) injection 25 g, 25 g, Intravenous, Q15 MIN PRN, Shayan Escobar M.D.    [] piperacillin-tazobactam (Zosyn) 4.5 g in  mL IVPB, 4.5 g, Intravenous, Once **AND** piperacillin-tazobactam (Zosyn) 4.5 g in  mL IVPB, 4.5 g, Intravenous, Q8HRS, Shayan Escobar M.D., Stopped at 10/13/23 0928  [unfilled]    Most Recent Vital Signs:  /49   Pulse 71   Temp 36.7 °C (98.1 °F) (Temporal)   Resp 16   Ht 1.702 m (5' 7\")   Wt 81.5 kg (179 lb 10.8 oz)   SpO2 96%   BMI 28.14 kg/m²   Temp  Av.7 °C (98 °F)  Min: 35.9 °C (96.6 °F)  Max: 37.8 °C (100 °F)    Physical Exam:  General: cachetic, no diaphoresis, well-appearing, crying due to the pain on the groin  HEENT: sclera anicteric, PERRL, extraocular muscles intact, mucous membranes moist, oropharynx clear and moist  Neck: supple  Chest: diffuse wheezing, no accessory muscle use, chest rise symmetrically  Cardiac: regular rate and rhythm, normal S1 S2, no murmurs, rubs or gallops  Abdomen: + bowel sounds, soft,  non-distended,  Extremities: WWP, no edema, 2+ pedal pulses  Skin: warm and dry   = Catheter inserted. Dressing applied on left groin w/o visible blood and pus  Neuro: Alert and oriented times 3, non-focal exam, speech fluent  Psych: crying, memory intact, normal judgement    Pertinent Lab Results:  Recent Labs     10/10/23  2043 10/12/23  0253 10/13/23  0813   WBC 10.9* 12.4* 10.9*    "   Recent Labs     10/10/23  2043 10/12/23  0253 10/12/23  2319 10/13/23  0813   HEMOGLOBIN 11.4* 9.1* 9.0* 9.3*   HEMATOCRIT 35.7* 27.9* 28.5* 29.5*   MCV 90.6 91.5  --  91.6   MCH 28.9 29.8  --  28.9   PLATELETCT 698* 670*  --  668*         Recent Labs     10/12/23  0253 10/12/23  2319 10/13/23  0813   SODIUM 132* 136 136   POTASSIUM 4.2 4.2 3.9   CHLORIDE 100 105 104   CO2 23 23 24   CREATININE 0.82 0.67 0.61        Recent Labs     10/10/23  2043   ALBUMIN 2.1*        Pertinent Micro:  Results       Procedure Component Value Units Date/Time    Fungal Culture [[id number]] Collected: 10/10/23 2244    Order Status: Completed Specimen: Wound Updated: 10/13/23 1127     Significant Indicator NEG     Source WND     Site Left Groin     Culture Result Culture in progress.     Fungal Smear Results No fungal elements seen.    Narrative:      Surgery - swabs received    CULTURE WOUND W/ GRAM STAIN [[id number]]  (Abnormal) Collected: 10/10/23 2244    Order Status: Completed Specimen: Wound Updated: 10/13/23 1127     Significant Indicator POS     Source WND     Site Left Groin     Culture Result Moderate growth usual urogenital johnie including enteric  johnie.       Gram Stain Result Moderate WBCs.  Many mixed bacteria, no predominant organism seen.       Culture Result Staphylococcus aureus  Light growth  PBP2 in progress      Narrative:      Surgery - swabs received    Anaerobic Culture [[id number]] Collected: 10/10/23 2244    Order Status: Completed Specimen: Wound Updated: 10/13/23 1127     Significant Indicator NEG     Source WND     Site Left Groin     Culture Result Culture in progress.    Narrative:      Surgery - swabs received    GRAM STAIN [[id number]] Collected: 10/10/23 2244    Order Status: Completed Specimen: Wound Updated: 10/11/23 1948     Significant Indicator .     Source WND     Site Left Groin     Gram Stain Result Moderate WBCs.  Many mixed bacteria, no predominant organism seen.      Narrative:      Surgery -  swabs received    Fungal Smear [[id number]] Collected: 10/10/23 2244    Order Status: Completed Specimen: Wound Updated: 10/11/23 1948     Significant Indicator NEG     Source WND     Site Left Groin     Fungal Smear Results No fungal elements seen.    Narrative:      Surgery - swabs received    MRSA By PCR (Amp) [[id number]]     Order Status: Sent Specimen: Respirate from Nares     Blood Culture [[id number]]     Order Status: No result Specimen: Blood from Peripheral     Blood Culture [[id number]]     Order Status: No result Specimen: Blood from Peripheral     Urinalysis [[id number]]     Order Status: No result Specimen: Urine           Blood Culture   Date Value Ref Range Status   10/29/2009 No growth after 5 days of incubation.  Final        Studies:  DX-CHEST-PORTABLE (1 VIEW)    Result Date: 9/23/2023 9/23/2023 7:30 AM HISTORY/REASON FOR EXAM:  Shortness of Breath. TECHNIQUE/EXAM DESCRIPTION AND NUMBER OF VIEWS: Single portable view of the chest. COMPARISON: One view chest 2/3/2023 FINDINGS: Projections ASHKAN. Bony structures are unremarkable. Heart size is estimated normal. Pulmonary vascularity is normal. Lung fields are clear. There is no effusion. There is peculiar curvilinear lucency at soft tissue shadow superimposed over the left hemithorax. Suspect skin folds. Morphology is not indicative of pneumothorax.     1.  No acute cardiopulmonary disease. 2.  Peculiar curvilinear lucency and soft tissue shadows project over the left hemithorax. This is not thought to represent pneumothorax. If there is clinical concern based on physical exam, repeat chest x-ray could be obtained.      IMPRESSION:   1. Necrotizing infection on left groin secondary to DM s/p I&D on 10/10 and 10/12  2. Leukocytosis        PLAN:   Fannie Driver is a 63 y.o. female past medical history of DM and previous DVT not currently on anticoagulation who presented 10/10/2023 as a transfer from [facility]  to treat necrotizing infection  on left groin.    #Necrotizing infection on left groin secondary to DM s/p I&D on 10/10 and 10/12  # Leukocytosis  Ortho surgeon performed I&D on 10/10 and 10/12 with partial delayed primary closure performed on 10/12.  Wound culture presented Staph aureus growth with moderate urogenital johnie including enteric johnie. She has high risk of MRSA due to recent hospitalization and transferred to SNF after discharge.  - Continue Zosyn and linezolid. (We will consider narrow spectrum of antibiotics based on MRSA snare test and susceptibility from wound culture)  - Continue f/u for wound culture on 10/10 for susceptibility  - MRSA snare test pending  - CBC qAM      Plan of care discussed with ID Dr. Nelia Flores M.D.. Will continue to follow.    Prince OMAR Bell D.O.      Please note that this dictation was created using voice recognition software. I have worked with technical experts from [facility] to optimize the interface.  I have made every reasonable attempt to correct obvious errors, but there may be errors of grammar and possibly content that I did not discover before finalizing the note.

## 2023-10-13 NOTE — OR NURSING
Pt's VSS. Pt A&Ox4. Pt's abdominal dressing CDI. Pt denies nausea. Pt's pain improving with medication. See MAR. Pt returning to room T426. Pt's sister, Tabatha, called and updated on pt's status.

## 2023-10-13 NOTE — CARE PLAN
The patient is Stable - Low risk of patient condition declining or worsening    Shift Goals  Clinical Goals: maintain skin integrity, pain management, hemodynamic stability  Patient Goals: pain control, rest    Progress made toward(s) clinical / shift goals:  Patient turned q2 hours, 2 RN skin assessment complete and no knew skin injuries noted. Pain medicated per MAR with regard to patients blood pressure. Patient received bolus to maintain hemodynamics. Ice pack provided to patient for pain relief. Patient resting comfortably.     Problem: Pain - Standard  Goal: Alleviation of pain or a reduction in pain to the patient’s comfort goal  Description: Target End Date:  Prior to discharge or change in level of care    Document on Vitals flowsheet    1.  Document pain using the appropriate pain scale per order or unit policy  2.  Educate and implement non-pharmacologic comfort measures (i.e. relaxation, distraction, massage, cold/heat therapy, etc.)  3.  Pain management medications as ordered  4.  Reassess pain after pain med administration per policy  5.  If opiods administered assess patient's response to pain medication is appropriate per POSS sedation scale  6.  Follow pain management plan developed in collaboration with patient and interdisciplinary team (including palliative care or pain specialists if applicable)  Outcome: Progressing     Problem: Skin Integrity  Goal: Skin integrity is maintained or improved  Description: Target End Date:  Prior to discharge or change in level of care    Document interventions on Skin Risk/Rene flowsheet groups and corresponding LDA    1.  Assess and monitor skin integrity, appearance and/or temperature  2.  Assess risk factors for impaired skin integrity and/or pressures ulcers  3.  Implement precautions to protect skin integrity in collaboration with interdisciplinary team  4.  Implement pressure ulcer prevention protocol if at risk for skin breakdown  5.  Confirm wound care  consult if at risk for skin breakdown  6.  Ensure patient use of pressure relieving devices  (Low air loss bed, waffle overlay, heel protectors, ROHO cushion, etc)  Outcome: Progressing     Problem: Fall Risk  Goal: Patient will remain free from falls  Description: Target End Date:  Prior to discharge or change in level of care    Document interventions on the [place] Fall Risk Assessment    1.  Assess for fall risk factors  2.  Implement fall precautions  Outcome: Progressing       Patient is not progressing towards the following goals:

## 2023-10-13 NOTE — DISCHARGE PLANNING
Case Management Discharge Planning  Choice obtained for [facility] in [facility]. Referral sent.  Admission Date: 10/10/2023  GMLOS: 6.9  ALOS: 3    6-Clicks ADL Score: 16  6-Clicks Mobility Score: 12  PT and/or OT Eval ordered: No  Post-acute Referrals Ordered: Yes  Post-acute Choice Obtained: Yes  Has referral(s) been sent to post-acute provider:  Yes      Anticipated Discharge Dispo: Discharge Disposition: Still a Patient (30)  Discharge Contact Phone Number: [phone]    DME Needed: Yes    DME Ordered: Yes    Action(s) Taken: Updated Provider/Nurse on Discharge Plan, Choice obtained, and Referral(s) sent    Escalations Completed: Pending Discharge Destination, Insurance , and Speciality Provider    Medically Clear: No    Next Steps:     Barriers to Discharge: Medical clearance, Pending Placement, and DME    Is the patient up for discharge tomorrow: No

## 2023-10-13 NOTE — PROGRESS NOTES
"  DATE: 10/13/2023    Hospital Day 3 Necrotizing soft tissue infection  10/10 Debridement of left groin, left labia, and left lower abdominal wall  10/12 Sharp excision debridement with partial closure    INTERVAL EVENTS:  Small dried drainage on surgical dressing  Awaiting wound care team for dressing change and she should be having daily wet to dry dressing changes.  WBC 10.9  Medicine managing antibiotics     PHYSICAL EXAMINATION:  Vital Signs: /49   Pulse 71   Temp 36.7 °C (98.1 °F) (Temporal)   Resp 16   Ht 1.702 m (5' 7\")   Wt 81.5 kg (179 lb 10.8 oz)   SpO2 96%     Physical Exam  Vitals reviewed.   Constitutional:       General: She is not in acute distress.     Appearance: She is not toxic-appearing.   Pulmonary:      Effort: Pulmonary effort is normal. No respiratory distress.   Abdominal:      General: There is no distension.      Tenderness: There is abdominal tenderness. There is no guarding or rebound.      Comments: Lower abdomen into groin with guaze dressing.   Sutures in place.    Genitourinary:     Comments: Vivas   Musculoskeletal:         General: No tenderness.   Skin:     General: Skin is warm and dry.      Capillary Refill: Capillary refill takes less than 2 seconds.      Coloration: Skin is not jaundiced.   Psychiatric:         Attention and Perception: Attention normal.         Mood and Affect: Mood normal.         Speech: Speech normal.         Behavior: Behavior normal. Behavior is cooperative.           LABORATORY VALUES:  Recent Labs     10/10/23  2043 10/12/23  0253 10/12/23  2319 10/13/23  0813   WBC 10.9* 12.4*  --  10.9*   RBC 3.94* 3.05*  --  3.22*   HEMOGLOBIN 11.4* 9.1* 9.0* 9.3*   HEMATOCRIT 35.7* 27.9* 28.5* 29.5*   MCV 90.6 91.5  --  91.6   MCH 28.9 29.8  --  28.9   MCHC 31.9* 32.6  --  31.5*   RDW 42.5 43.0  --  43.4   PLATELETCT 698* 670*  --  668*   MPV 8.9* 8.9*  --  8.8*     Recent Labs     10/12/23  0253 10/12/23  2319 10/13/23  0813   SODIUM 132* 136 136 "   POTASSIUM 4.2 4.2 3.9   CHLORIDE 100 105 104   CO2 23 23 24   GLUCOSE 326* 182* 153*   BUN 17 12 9   CREATININE 0.82 0.67 0.61   CALCIUM 8.2* 8.2* 8.1*     Recent Labs     10/10/23  2043   ASTSGOT 21   ALTSGPT 11   TBILIRUBIN 0.5   ALKPHOSPHAT 165*   GLOBULIN 4.7*   INR 1.53*     Recent Labs     10/10/23  2043   INR 1.53*        IMAGING:  OUTSIDE IMAGES-DX CHEST   Final Result      OUTSIDE IMAGES-CT ABDOMEN /PELVIS   Final Result          ASSESSMENT AND PLAN:  Necrotizing soft tissue infection- (present on admission)  Assessment & Plan  10/11 I&D with betadine packing done  10/12 OR today for irrigation, debridement. Unable to place wound vac.  Wound care dressing changes and wound team.   Antibiotics per Medicine team. Cultures pending     History of DVT (deep vein thrombosis)- (present on admission)  Assessment & Plan  Patient denies recently taking Xarelto however our pharmacist here called her SNF and she is still taking her medication.           ____________________________________     JEREMIAS Vargas.    DD: 10/13/2023  11:10 AM

## 2023-10-13 NOTE — DIETARY
"Nutrition services: Day 3 of admit.  Fannie Driver is a 63 y.o. female with admitting DX of Gangrene (HCC) [I96]     Consult received for MST 4: 24-33 lb wt loss x >1 yr, poor PO intake PTA. During bedside visit this afternoon, pt sleeping and speaking quietly but agreeable to interview. She reports 100 lb wt loss from 300 lb over past year, reports her current wt is 170 lb. She reprots had been eating poorly but unable to describe extent/duration; states was doing better after her son was able to help her which she says has been going on for \"months\". She prefers fruit to dessert foods, would like cottage cheese w/ breakfast.    Assessment:  Height: 170.2 cm (5' 7\")  Weight: 81.5 kg (179 lb 10.8 oz)  Body mass index is 28.14 kg/m²., BMI classification: Overweight  Diet/Intake: Consistent CHO; % x 3 meals per ADLs    Evaluation:   PMHx includes DM. Admitted w/ dx of gangrene, necrotizing soft tissue infection. Pt recently admitted at [facility] and assessed by [facility]. Criteria for moderate malnutrition met at that time.  Labs: glu 153, Ca 8.1, POC glu 120-173  MAR: lantus, SSI, morphine, oxycodone, zosyn, pericolace, miralax  Skin: sacrum and L heel stage II wounds; B/L groin incision. Surgical debridement 10/12, wound vac placement by wound team per MD update notes.  GI: last BM PTA  Nutrition-focused physical exam: moderate muscle loss to clavicle region; no appreciable fat wasting noted    Malnutrition Risk: Per RD note 09/26/23: \"Pt meets ASPEN criteria for moderate malnutrition in the context of chronic illness/environmental circumstances r/t decreased PO intake due to prior hospitalization and limited access to food at times at home AEB moderate muscle loss and wt loss of 31% x 1 year and 7.9% x 2.5 months.\" Pt continues to present w/ moderate muscle wasting.     Recommendations/Plan:  Update Computrition w/ pt's preference for fruit instead of dessert. Provide cottage cheese QD w/ breakfast. "   Encourage intake of meals >75%. Pt has been eating well overall per ADLs; anticipate continued good PO intake.  Document intake of all PO as % taken in ADL's to provide interdisciplinary communication across all shifts.   Monitor weight.  Nutrition rep will continue to see patient for ongoing meal and snack preferences.     RD following.

## 2023-10-13 NOTE — PROGRESS NOTES
0978- John Melchor contacted regarding patients BP 86/45. Ordered received.     8801 Bolus complete. BP now 94/49.

## 2023-10-13 NOTE — CARE PLAN
The patient is Watcher - Medium risk of patient condition declining or worsening    Shift Goals  Clinical Goals: stable VS/labs, pain control, maintain skin integrity, free from fall  Patient Goals: pain control, rest  Family Goals: not present at this time        Patient is not progressing towards the following goals:      Problem: Knowledge Deficit - Standard  Goal: Patient and family/care givers will demonstrate understanding of plan of care, disease process/condition, diagnostic tests and medications  Outcome: Progressing  Note: Educated pt on POC, monitor VS/labs, pain control, q2 turns, I/Os, wound vac placement, mobility, safety, DC planning, all questions answered, hourly rounding in progress     Problem: Pain - Standard  Goal: Alleviation of pain or a reduction in pain to the patient’s comfort goal  Outcome: Progressing  Note: Medicated with oxy 5 per MAR with adequate pain control, per pt report, pt resting in bed comfortably at this time, hourly rounding in progress      Problem: Skin Integrity  Goal: Skin integrity is maintained or improved  Outcome: Progressing  Note: Q2 turns, bilateral heels and sacrum mepilexes in place, waffle overlay in place, hourly rounding in progress

## 2023-10-13 NOTE — PROGRESS NOTES
NOC HOSPITALIST CROSS COVER    Notified by RN regarding patient being hypotensive as low as 86/45. The patient was mildly symptomatic with some dizziness. 250 mL LR bolus given with improvement in patient's BP and symptoms.       Vitals:    10/13/23 0335   BP: 108/60   Pulse: 72   Resp: 14   Temp: 36.5 °C (97.7 °F)   SpO2: 97%          -----------------------------------------------------------------------------------------------------------    Electronically signed by:  John Melchor, LAN, APRN, VALP-BC  Hospitalist Services

## 2023-10-13 NOTE — DISCHARGE PLANNING
0957  Received Choice Form at: 0953 am  Agency/Facility Name: [facility]   Sent Referral per Choice Form at: 0957 am    1133  Agency/Facility Name: [facility]  Spoke To: Terri  Outcome: Per Terri referral under review still, Terri to follow up with DPA later.    1326  Agency/Facility Name: [facility]  Spoke To: Terri  Outcome: Per Terri referral still under review. DPA to follow up later.  RN CM notified.    1504  Agency/Facility Name: [facility]  Spoke To: Jacqueline  Outcome: Per Jacqueline referral is under review. RN HANNAH notified.

## 2023-10-13 NOTE — PROGRESS NOTES
Pt is A&O 4  Pain + Medicated per MAR   - nausea  Tolerating a CHO diet  Wound to suprapubic area DIP  - Drains  + Voids via palomares   - flatus  - BM  Up max assist, patient states she has not walked for a long time, even before admission   SCD's on  Bed alarm on, pt high fall risk per kasi shukla  Reviewed plan of care with patient, bed in lowest position and locked, pt resting comfortably now, call light within reach, all needs met at this time. Interventions will be executed per plan of care

## 2023-10-13 NOTE — PROGRESS NOTES
Report received. Assumed care. Pt in bed awake A/O x4. VSS. Responds appropriately. C/O pain, medicated per MAR, no SOB. Assessment complete. Surgical dressings to abdomen in place with small serous drainage, intact. Vivas in place with moderate yellow urine output, intact. Bilateral heels with mepilex in place, heel float boots on. Sacral mepilex in place. Discussed POC, , pt verbalizes understanding. Explained importance of calling before getting OOB. Call light and belongings within reach. Bed alarm on. Bed in the lowest position. Treaded socks in place. Hourly rounding in progress. Will continue to monitor .

## 2023-10-13 NOTE — PROGRESS NOTES
4 Eyes Skin Assessment Completed by CARIE Phillips and LON RN.     Head WDL  Ears WDL  Nose WDL  Mouth WDL  Neck WDL  Breast/Chest WDL   Shoulder Blades WDL  Spine WDL  (R) Arm/Elbow/Hand Mepilex applied to elbow 10/11 not assessed underneath   (L) Arm/Elbow/Hand Mepilex applied to elbow 10/11 not assessed underneath   Abdomen Wound to lower abdomen, DIP not assessed under dressing  Groin WDL  Scrotum/Coccyx/Buttocks Mepilex in place to sacrum, applied today Q72 dressing change ordered, not assessed underneath   (R) Leg WDL, flaky  (L) Leg WDL, flaky   (R) Heel/Foot/Toe Redness and Scab to top of foot  (L) Heel/Foot/Toe Heel mepilex in place, not assessed underneath               Devices In Places Tele Box, Pulse Ox, Vivas, and SCD's        Interventions In Place Heel Mepilex, Sacral Mepilex, Heel Float Boots, Waffle Overlay, TAP System, Pillows, Elbow Mepilex, Q2 Turns, and Pressure Redistribution Mattress     Possible Skin Injury Yes     Pictures Uploaded Into Epic Yes  Wound Consult Placed Yes  RN Wound Prevention Protocol Ordered Yes

## 2023-10-14 LAB
ANION GAP SERPL CALC-SCNC: 8 MMOL/L (ref 7–16)
BACTERIA SPEC ANAEROBE CULT: ABNORMAL
BACTERIA SPEC ANAEROBE CULT: ABNORMAL
BUN SERPL-MCNC: 6 MG/DL (ref 8–22)
CALCIUM SERPL-MCNC: 8.1 MG/DL (ref 8.5–10.5)
CHLORIDE SERPL-SCNC: 107 MMOL/L (ref 96–112)
CO2 SERPL-SCNC: 24 MMOL/L (ref 20–33)
CREAT SERPL-MCNC: 0.54 MG/DL (ref 0.5–1.4)
ERYTHROCYTE [DISTWIDTH] IN BLOOD BY AUTOMATED COUNT: 43 FL (ref 35.9–50)
GFR SERPLBLD CREATININE-BSD FMLA CKD-EPI: 104 ML/MIN/1.73 M 2
GLUCOSE BLD STRIP.AUTO-MCNC: 100 MG/DL (ref 65–99)
GLUCOSE BLD STRIP.AUTO-MCNC: 110 MG/DL (ref 65–99)
GLUCOSE BLD STRIP.AUTO-MCNC: 123 MG/DL (ref 65–99)
GLUCOSE BLD STRIP.AUTO-MCNC: 155 MG/DL (ref 65–99)
GLUCOSE BLD STRIP.AUTO-MCNC: 87 MG/DL (ref 65–99)
GLUCOSE SERPL-MCNC: 108 MG/DL (ref 65–99)
HCT VFR BLD AUTO: 30.5 % (ref 37–47)
HGB BLD-MCNC: 9.6 G/DL (ref 12–16)
MCH RBC QN AUTO: 28.8 PG (ref 27–33)
MCHC RBC AUTO-ENTMCNC: 31.5 G/DL (ref 32.2–35.5)
MCV RBC AUTO: 91.6 FL (ref 81.4–97.8)
PLATELET # BLD AUTO: 648 K/UL (ref 164–446)
PMV BLD AUTO: 8.6 FL (ref 9–12.9)
POTASSIUM SERPL-SCNC: 3.8 MMOL/L (ref 3.6–5.5)
RBC # BLD AUTO: 3.33 M/UL (ref 4.2–5.4)
SIGNIFICANT IND 70042: ABNORMAL
SITE SITE: ABNORMAL
SODIUM SERPL-SCNC: 139 MMOL/L (ref 135–145)
SOURCE SOURCE: ABNORMAL
WBC # BLD AUTO: 10 K/UL (ref 4.8–10.8)

## 2023-10-14 PROCEDURE — 99233 SBSQ HOSP IP/OBS HIGH 50: CPT | Performed by: INTERNAL MEDICINE

## 2023-10-14 PROCEDURE — A9270 NON-COVERED ITEM OR SERVICE: HCPCS | Performed by: STUDENT IN AN ORGANIZED HEALTH CARE EDUCATION/TRAINING PROGRAM

## 2023-10-14 PROCEDURE — 700102 HCHG RX REV CODE 250 W/ 637 OVERRIDE(OP): Performed by: STUDENT IN AN ORGANIZED HEALTH CARE EDUCATION/TRAINING PROGRAM

## 2023-10-14 PROCEDURE — 700105 HCHG RX REV CODE 258: Performed by: INTERNAL MEDICINE

## 2023-10-14 PROCEDURE — 36415 COLL VENOUS BLD VENIPUNCTURE: CPT

## 2023-10-14 PROCEDURE — 700105 HCHG RX REV CODE 258: Performed by: STUDENT IN AN ORGANIZED HEALTH CARE EDUCATION/TRAINING PROGRAM

## 2023-10-14 PROCEDURE — 700102 HCHG RX REV CODE 250 W/ 637 OVERRIDE(OP): Performed by: INTERNAL MEDICINE

## 2023-10-14 PROCEDURE — 770001 HCHG ROOM/CARE - MED/SURG/GYN PRIV*

## 2023-10-14 PROCEDURE — 700111 HCHG RX REV CODE 636 W/ 250 OVERRIDE (IP): Mod: JZ | Performed by: INTERNAL MEDICINE

## 2023-10-14 PROCEDURE — 97605 NEG PRS WND THER DME<=50SQCM: CPT

## 2023-10-14 PROCEDURE — A9270 NON-COVERED ITEM OR SERVICE: HCPCS | Performed by: INTERNAL MEDICINE

## 2023-10-14 PROCEDURE — 80048 BASIC METABOLIC PNL TOTAL CA: CPT

## 2023-10-14 PROCEDURE — 700101 HCHG RX REV CODE 250: Performed by: STUDENT IN AN ORGANIZED HEALTH CARE EDUCATION/TRAINING PROGRAM

## 2023-10-14 PROCEDURE — 700111 HCHG RX REV CODE 636 W/ 250 OVERRIDE (IP): Mod: JZ | Performed by: STUDENT IN AN ORGANIZED HEALTH CARE EDUCATION/TRAINING PROGRAM

## 2023-10-14 PROCEDURE — 99232 SBSQ HOSP IP/OBS MODERATE 35: CPT | Performed by: STUDENT IN AN ORGANIZED HEALTH CARE EDUCATION/TRAINING PROGRAM

## 2023-10-14 PROCEDURE — 99232 SBSQ HOSP IP/OBS MODERATE 35: CPT | Performed by: NURSE PRACTITIONER

## 2023-10-14 PROCEDURE — 82962 GLUCOSE BLOOD TEST: CPT

## 2023-10-14 PROCEDURE — 85027 COMPLETE CBC AUTOMATED: CPT

## 2023-10-14 RX ORDER — OXYCODONE HYDROCHLORIDE 10 MG/1
10 TABLET ORAL EVERY 4 HOURS PRN
Status: DISCONTINUED | OUTPATIENT
Start: 2023-10-14 | End: 2023-10-16

## 2023-10-14 RX ORDER — DEXTROSE MONOHYDRATE 25 G/50ML
25 INJECTION, SOLUTION INTRAVENOUS
Status: DISCONTINUED | OUTPATIENT
Start: 2023-10-14 | End: 2023-10-17 | Stop reason: HOSPADM

## 2023-10-14 RX ADMIN — PIPERACILLIN AND TAZOBACTAM 3.38 G: 3; .375 INJECTION, POWDER, FOR SOLUTION INTRAVENOUS at 13:52

## 2023-10-14 RX ADMIN — INSULIN GLARGINE-YFGN 25 UNITS: 100 INJECTION, SOLUTION SUBCUTANEOUS at 17:04

## 2023-10-14 RX ADMIN — LINEZOLID 600 MG: 600 TABLET, FILM COATED ORAL at 17:04

## 2023-10-14 RX ADMIN — SODIUM CHLORIDE, POTASSIUM CHLORIDE, SODIUM LACTATE AND CALCIUM CHLORIDE: 600; 310; 30; 20 INJECTION, SOLUTION INTRAVENOUS at 00:40

## 2023-10-14 RX ADMIN — MORPHINE SULFATE 3 MG: 4 INJECTION, SOLUTION INTRAMUSCULAR; INTRAVENOUS at 10:17

## 2023-10-14 RX ADMIN — OXYCODONE 5 MG: 5 TABLET ORAL at 09:45

## 2023-10-14 RX ADMIN — PIPERACILLIN AND TAZOBACTAM 3.38 G: 3; .375 INJECTION, POWDER, FOR SOLUTION INTRAVENOUS at 22:20

## 2023-10-14 RX ADMIN — RIVAROXABAN 10 MG: 10 TABLET, FILM COATED ORAL at 17:04

## 2023-10-14 RX ADMIN — SODIUM CHLORIDE, POTASSIUM CHLORIDE, SODIUM LACTATE AND CALCIUM CHLORIDE: 600; 310; 30; 20 INJECTION, SOLUTION INTRAVENOUS at 13:55

## 2023-10-14 RX ADMIN — INSULIN HUMAN 1 UNITS: 100 INJECTION, SOLUTION PARENTERAL at 00:33

## 2023-10-14 RX ADMIN — OXYCODONE HYDROCHLORIDE 10 MG: 10 TABLET ORAL at 22:22

## 2023-10-14 RX ADMIN — MAGNESIUM HYDROXIDE 30 ML: 1200 LIQUID ORAL at 00:30

## 2023-10-14 RX ADMIN — PIPERACILLIN AND TAZOBACTAM 4.5 G: 4; .5 INJECTION, POWDER, FOR SOLUTION INTRAVENOUS at 04:33

## 2023-10-14 RX ADMIN — LINEZOLID 600 MG: 600 TABLET, FILM COATED ORAL at 04:34

## 2023-10-14 RX ADMIN — OXYCODONE 5 MG: 5 TABLET ORAL at 04:34

## 2023-10-14 RX ADMIN — OXYCODONE 5 MG: 5 TABLET ORAL at 00:29

## 2023-10-14 RX ADMIN — DOCUSATE SODIUM 50 MG AND SENNOSIDES 8.6 MG 2 TABLET: 8.6; 5 TABLET, FILM COATED ORAL at 04:34

## 2023-10-14 RX ADMIN — LIDOCAINE HYDROCHLORIDE 1 APPLICATION: 40 SOLUTION TOPICAL at 09:48

## 2023-10-14 RX ADMIN — OXYCODONE HYDROCHLORIDE 10 MG: 10 TABLET ORAL at 18:28

## 2023-10-14 RX ADMIN — OXYCODONE HYDROCHLORIDE 10 MG: 10 TABLET ORAL at 13:49

## 2023-10-14 ASSESSMENT — ENCOUNTER SYMPTOMS
CHILLS: 0
HEADACHES: 0
DIZZINESS: 0
NAUSEA: 0
MYALGIAS: 1
COUGH: 0
VOMITING: 0
BACK PAIN: 0
ABDOMINAL PAIN: 0
EYE PAIN: 0
SHORTNESS OF BREATH: 0
FEVER: 0
BLURRED VISION: 0
PALPITATIONS: 0
INSOMNIA: 0

## 2023-10-14 ASSESSMENT — PAIN DESCRIPTION - PAIN TYPE
TYPE: ACUTE PAIN
TYPE: ACUTE PAIN;SURGICAL PAIN
TYPE: ACUTE PAIN
TYPE: ACUTE PAIN

## 2023-10-14 ASSESSMENT — LIFESTYLE VARIABLES: SUBSTANCE_ABUSE: 0

## 2023-10-14 NOTE — WOUND TEAM
[facility]  Inpatient Services  Initial Wound and Skin Care Evaluation    Admission Date: 10/10/2023     Last order of IP CONSULT TO WOUND CARE was found on 10/12/2023 from Hospital Encounter on 10/10/2023     HPI, PMH, SH: Reviewed    Past Surgical History:   Procedure Laterality Date    IRRIGATION & DEBRIDEMENT GENERAL Left 10/12/2023    Procedure: SHARP EXCISION AND DEBRIDEMENT OF SKIN, SUBCUTANEUS AND FASCIA 10SQ CM, WOUND - ABDOMINAL WALL WOUND;  Surgeon: Rj Gutiérrez M.D.;  Location: SURGERY [facility];  Service: General    WOUND CLOSURE [facility] Left 10/12/2023    Procedure: PARTIAL DELAYED CLOSURE 8CM, WOUND;  Surgeon: Rj Gutiérrez M.D.;  Location: SURGERY [facility];  Service: General    IRRIGATION & DEBRIDEMENT [facility] 10/10/2023    Procedure: IRRIGATION AND DEBRIDEMENT, WOUND;  Surgeon: Praveen Chisholm M.D.;  Location: SURGERY [facility];  Service: Trauma    DENTAL OSTEOTOMY N/A 2/6/2023    Procedure: I&D OF LEFT BUCCAL ABSCESS; EXTRACTION OF TEETH;  Surgeon: Lore Dukes D.D.S.;  Location: SURGERY SAME DAY [facility];  Service: Oral Surgery    ERCP IN OR  10/30/2009    Performed by CESAR BATES at SURGERY [facility] ORS    CHOLECYSTECTOMY  10/09    OTHER ABDOMINAL SURGERY      tubal ligation     Social History     Tobacco Use    Smoking status: Never    Smokeless tobacco: Never   Substance Use Topics    Alcohol use: Not on file     No chief complaint on file.    Diagnosis: Gangrene (HCC) [I96]    Unit where seen by Wound Team: T426/00     WOUND CONSULT RELATED TO:  L labia NPWT placement    WOUND TEAM PLAN OF CARE - Frequency of Follow-up:   Nursing to follow dressing orders written for wound care. Contact wound team if area fails to progress, deteriorates or with any questions/concerns if something comes up before next scheduled follow up (See below as to whether wound is following and frequency of wound follow up)   NPWT change 3 times weekly - L Labia    WOUND  "HISTORY:   \"Necrotizing soft tissue infection  10/10 Debridement of left groin, left labia, and left lower abdominal wall  10/12 Sharp excision debridement with partial closure\"       WOUND ASSESSMENT/LDA          Negative Pressure Wound Therapy 10/14/23 Labia;Groin (Active)   Wound Image   10/14/23 1300   Vacuum Serial Number [id number] 10/14/23 1300   NPWT Pump Mode / Pressure Setting Ulta;Continuous;125 mmHg 10/14/23 1300   Dressing Type Medium;Black Foam (Veraflo) 10/14/23 1300   Number of Foam Pieces Used 3 10/14/23 1300   Canister Changed No 10/14/23 1300   NEXT Dressing Change/Treatment Date 10/16/23 10/14/23 1300        Wound 10/10/23 Incision Groin;Labia Left (Active)   Wound Image    10/14/23 1300   Site Assessment Red;Yellow;Pink;Tunneling;Undermining 10/14/23 1300   Periwound Assessment Clean;Dry;Intact 10/14/23 1300   Margins Attached edges;Unattached edges 10/14/23 1300   Closure Secondary intention 10/14/23 1300   Drainage Amount Small 10/14/23 1300   Drainage Description Serosanguineous 10/14/23 1300   Treatments Cleansed;Site care 10/14/23 1300   Wound Cleansing Approved Wound Cleanser 10/14/23 1300   Periwound Protectant No-sting Skin Prep;Paste Ring;Drape 10/14/23 1300   Dressing Status Clean;Dry;Intact 10/14/23 1300   Dressing Changed Changed 10/14/23 1300   Dressing Cleansing/Solutions Not Applicable 10/14/23 1300   Dressing Options Mepitel One;Collagen Dressing;Wound Vac;Offloading Dressing - Sacral 10/14/23 1300   Dressing Change/Treatment Frequency Monday, Wednesday, Friday, and As Needed 10/14/23 1300   NEXT Dressing Change/Treatment Date 10/16/23 10/14/23 1300   NEXT Weekly Photo (Inpatient Only) 10/20/23 10/14/23 1300   Wound Team Following 3x Weekly 10/14/23 1300   Non-staged Wound Description Full thickness 10/14/23 1300   Wound Length (cm) 7 cm 10/14/23 1300   Wound Width (cm) 11 cm 10/14/23 1300   Wound Depth (cm) 4.8 cm 10/14/23 1300   Wound Surface Area (cm^2) 77 cm^2 10/14/23 1300 "   Wound Volume (cm^3) 369.6 cm^3 10/14/23 1300   Tunneling (cm) 12 cm 10/14/23 1300   Tunneling Clock Position of Wound 2 10/14/23 1300   Undermining (cm) 5 cm 10/14/23 1300   Undermining of Wound, 1st Location From 12 o'clock;To 1 o'clock 10/14/23 1300   Shape Irregular triangle 10/14/23 1300   Wound Odor None 10/14/23 1300   WOUND NURSE ONLY - Time Spent with Patient (mins) 60 10/14/23 1300        Vascular:    TARSHA:   No results found.    Lab Values:    Lab Results   Component Value Date/Time    WBC 10.0 10/14/2023 08:28 AM    RBC 3.33 (L) 10/14/2023 08:28 AM    HEMOGLOBIN 9.6 (L) 10/14/2023 08:28 AM    HEMATOCRIT 30.5 (L) 10/14/2023 08:28 AM    HBA1C 13.4 (H) 10/10/2023 08:43 PM         Culture Results show:  No results found for this or any previous visit (from the past 720 hour(s)).    Pain Level/Medicated:  4% Lidocaine allowed to dwell on wound bed 30 min prior, PO pain medications administered by bedside RN 30 min prior, and IV pain medications administered by bedside RN immediately prior (Pt educated that IV medication will not be available on outpatient basis)       INTERVENTIONS BY WOUND TEAM:  Chart and images reviewed. Discussed with bedside RN. All areas of concern (based on picture review, LDA review and discussion with bedside RN) have been thoroughly assessed. Documentation of areas based on significant findings. This RN in to assess patient. Performed standard wound care which includes appropriate positioning, dressing removal and non-selective debridement. Pictures and measurements obtained weekly if/when required.    Wound:  Left Labia  Preparation for Dressing removal: Dressing soaked with Lidocaine and Dressing soaked with wound cleanser  Cleansed/Non-selectively Debrided with:  Wound cleanser and Gauze  Lindy wound: Cleansed with Wound cleanser and Gauze, Prepped with No Sting, Paste Rings, Drape, Mepitel One, and surrounding hairs removed with razor and scissors  Primary Dressing:  Collagen  dressing tucked into tunnel and undermining area. 1 strip of spiral black lightly tucked into tunnel and filled remainder of wound bed. 2nd strip of spiral black foam over incision. Foam secured with drape.  Secondary (Outer) Dressing: Hole cut, button and tracpad applied. Regular NPWT started. No leaks noted. Offloading foam underneath tubing.    Advanced Wound Care Discharge Planning  Number of Clinicians necessary to complete wound care: 2  Is patient requiring IV pain medications for dressing changes:  Yes  Length of time for dressing change 40 min. (This does not include chart review, pre-medication time, set up, clean up or time spent charting.)    Interdisciplinary consultation: Patient, Bedside RN (Mayra), Jacqueline KRAMER (Wound RN) and Alcira HERNANDEZ (Wound RN).  Pressure injury and staging reviewed with N/A.    EVALUATION / RATIONALE FOR TREATMENT:     Date:  10/14/23  Wound Status:  Initial evaluation    S/P excisional debridement and partial closure. Left labial wound fairly clean. Ashley dressing used to tunnel and undermining to absorb destructive components of wound exudate and create an optimal environment for cellular growth. NPWT applied to manage bioburden and exudate, increase oxygenation and granulation tissue production to the area, and assist in wound closure by secondary intention.      Date:  10/11/23  Wound Status:  Initial evaluation    Patient sacrococcygeal area with POA stage 2 pressure injury. Reticular dermis present to wound bed. Thin hydrocolloid applied to maintain occlusive, moist wound healing environment. Waffle overlay ordered for additional offloading.    POA stage 2 pressure injury to left heel still with deflated blister. Offloading dressing applied to prevent further trauma to skin.    R anterior ankle with nonblanching discoloration. Appears to be scar tissue.         Goals: Steady decrease in wound area and depth weekly.    NURSING PLAN OF CARE ORDERS:  Dressing changes: See Dressing Care  orders    NUTRITION RECOMMENDATIONS   Wound Team Recommendations:  N/A    DIET ORDERS (From admission to next 24h)       Start     Ordered    10/12/23 1651  Diet Order Diet: Consistent CHO (Diabetic)  ALL MEALS        Question:  Diet:  Answer:  Consistent CHO (Diabetic)    10/12/23 1653                    PREVENTATIVE INTERVENTIONS:    Q shift Rene - performed per nursing policy  Q shift pressure point assessments - performed per nursing policy    Surface/Positioning  Reposition q 2 hours - Currently in Place  Waffle overlay  - Currently in Place    Offloading/Redistribution  Sacral offloading dressing (Silicone dressing) - Currently in Place  Heel float boots (Prevalon boot) - Currently in Place      Containment/Moisture Prevention    Vivas Catheter - Currently in Place    Anticipated discharge plans:  TBD        Vac Discharge Needs:  Vac Discharge plan is purely a recommendation from wound team and not a requirement for discharge unless otherwise stated by physician.  Regular Vac in use and continued at discharge

## 2023-10-14 NOTE — PROGRESS NOTES
[facility] Medicine Daily Progress Note    Date of Service  10/14/2023    Chief Complaint  Fannie Driver is a 63 y.o. female admitted 10/10/2023 with tissue infection.    Hospital Course  Fannie Driver is a 63 y.o. female past medical history of diabetes mellitus who presented 10/10/2023 as a transfer from 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  Patient reports she had a wound above groin which started spontaneously draining purulent discharge.  She was given IV Zosyn and IV fluids prior to arrival however they recommended transfer given CT findings of possible gas gangrene/foreign years gangrene.  I have consulted general surgery who plans to take her for incision and drainage in OR tonight.  I have kept her n.p.o. and have ordered additional antibiotics including vancomycin and clindamycin.  Admitted to medicine service.    Interval Problem Update  No acute events overnight.  Patient worried about wound vac placement pain.  Wound vac placement this morning by wound team.  Surgery team following, no plans for further surgery.  Cultures pending.  ID following, continue antibiotics.  PT/OT eval.  Patient will likely need SNF when medically cleared, SNF pending.    I have discussed this patient's plan of care and discharge plan at IDT rounds today with Case Management, Nursing, Nursing leadership, and other members of the IDT team.    Consultants/Specialty  general surgery    Code Status  Full Code    Disposition  The patient is not medically cleared for discharge to home or a post-acute facility.  Anticipate discharge to: skilled nursing facility    I have placed the appropriate orders for post-discharge needs.    Review of Systems  Review of Systems   Constitutional:  Negative for chills and fever.   Eyes:  Negative for blurred vision and pain.   Respiratory:  Negative for cough and shortness of breath.    Cardiovascular:  Negative for chest pain,  palpitations and leg swelling.   Gastrointestinal:  Negative for abdominal pain, nausea and vomiting.   Genitourinary:  Negative for dysuria and urgency.   Musculoskeletal:  Negative for back pain.   Skin:  Negative for itching and rash.   Neurological:  Negative for dizziness and headaches.   Psychiatric/Behavioral:  Negative for substance abuse. The patient does not have insomnia.         Physical Exam  Temp:  [36.6 °C (97.9 °F)-37 °C (98.6 °F)] 36.7 °C (98.1 °F)  Pulse:  [64-83] 72  Resp:  [14-18] 18  BP: (110-146)/(56-90) 110/56  SpO2:  [94 %-97 %] 97 %    Physical Exam  Vitals reviewed.   Constitutional:       General: She is not in acute distress.     Appearance: She is not diaphoretic.   HENT:      Head: Normocephalic and atraumatic.      Right Ear: External ear normal.      Left Ear: External ear normal.      Nose: Nose normal. No congestion.      Mouth/Throat:      Pharynx: No oropharyngeal exudate or posterior oropharyngeal erythema.   Eyes:      Extraocular Movements: Extraocular movements intact.      Pupils: Pupils are equal, round, and reactive to light.   Cardiovascular:      Rate and Rhythm: Normal rate and regular rhythm.   Pulmonary:      Effort: Pulmonary effort is normal.      Breath sounds: Normal breath sounds.   Abdominal:      General: Bowel sounds are normal. There is no distension.      Palpations: Abdomen is soft.      Tenderness: There is no abdominal tenderness. There is no guarding.   Musculoskeletal:         General: Deformity present. No swelling. Normal range of motion.      Cervical back: Normal range of motion and neck supple.   Skin:     General: Skin is warm and dry.   Neurological:      General: No focal deficit present.      Mental Status: She is alert and oriented to person, place, and time.      Cranial Nerves: No cranial nerve deficit.      Sensory: No sensory deficit.      Motor: No weakness.   Psychiatric:         Mood and Affect: Mood normal.         Behavior: Behavior  normal.         Fluids    Intake/Output Summary (Last 24 hours) at 10/14/2023 1339  Last data filed at 10/14/2023 0849  Gross per 24 hour   Intake --   Output 1060 ml   Net -1060 ml       Laboratory  Recent Labs     10/12/23  0253 10/12/23  2319 10/13/23  0813 10/14/23  0828   WBC 12.4*  --  10.9* 10.0   RBC 3.05*  --  3.22* 3.33*   HEMOGLOBIN 9.1* 9.0* 9.3* 9.6*   HEMATOCRIT 27.9* 28.5* 29.5* 30.5*   MCV 91.5  --  91.6 91.6   MCH 29.8  --  28.9 28.8   MCHC 32.6  --  31.5* 31.5*   RDW 43.0  --  43.4 43.0   PLATELETCT 670*  --  668* 648*   MPV 8.9*  --  8.8* 8.6*     Recent Labs     10/12/23  2319 10/13/23  0813 10/14/23  0828   SODIUM 136 136 139   POTASSIUM 4.2 3.9 3.8   CHLORIDE 105 104 107   CO2 23 24 24   GLUCOSE 182* 153* 108*   BUN 12 9 6*   CREATININE 0.67 0.61 0.54   CALCIUM 8.2* 8.1* 8.1*                     Imaging  OUTSIDE IMAGES-DX CHEST   Final Result      OUTSIDE IMAGES-CT ABDOMEN /PELVIS   Final Result           Assessment/Plan  Necrotizing soft tissue infection- (present on admission)  Assessment & Plan  S/p surgical debridement of left groin, abdominal wall, labia on 10/10  S/p debridement 10/12  Continue IV antibiotics, follow up cultures  ID consulted  Plan for wound vac placement by wound team    History of DVT (deep vein thrombosis)- (present on admission)  Assessment & Plan  Completed acute DVT treatment per pharmacy records review, now on prophylactic dose xarelto at home  Continue prophylactic dose xarelto    Diabetes mellitus (HCC)- (present on admission)  Assessment & Plan  SSI   lantus 30 U daily for now with ISS; reportedly taking 44U lantus daily at home in addition to ISS  Monitor blood sugars, adjust insulin regimen as needed         VTE prophylaxis:    Xarelto 10mg daily as prophylaxis

## 2023-10-14 NOTE — DOCUMENTATION QUERY
"                                                                         [facility]                                                                       Query Response Note      PATIENT:               IZA ORDONEZ  ACCT #:                  [id number]  MRN:                     [id number]  :                      1960  ADMIT DATE:       10/10/2023 7:54 PM  DISCH DATE:          RESPONDING  PROVIDER #:        [id number]           QUERY TEXT:    Excisional debridement (or documentation describing an excision) has been documented in the medical record.  Please document the deepest level of debridement treated.    The patient's Clinical Indicators include:  62yo with Necrotizing soft tissue infection, DM2    10/10 OP report \"All loculations were broken up and necrotic tissue excised using electrocautery.\"    Risk factors: age, DM2, necrotizing soft tissue infection.  Treatments: debridement, monitoring, medication, imaging    Contact me with questions.      Thank you,  CARY Curry, CDI  [email or web address]  Options provided:   -- Deepest level of debridement treated - skin   -- Deepest level of debridement treated - subcutaneous tissue or fascia   -- Deepest level of debridement treated - muscle   -- Deepest level of debridement treated - bone   -- Other explanation:other explanation-, please specify   -- Unable to determine      Query created by: Hiwot Guevara on 10/12/2023 6:42 AM    RESPONSE TEXT:    Deepest level of debridement treated - subcutaneous tissue or fascia          Electronically signed by:  MICHA BENITO MD 10/13/2023 5:09 PM              "

## 2023-10-14 NOTE — PROGRESS NOTES
"  DATE: 10/14/2023    Hospital Day 4 Necrotizing soft tissue infection  10/10 Debridement of left groin, left labia, and left lower abdominal wall  10/12 Sharp excision debridement with partial closure    INTERVAL EVENTS:  ID consulted for antibiotics   Wound team to place wound vac this am    -No acute surgical plans  -Surgery will follow       PHYSICAL EXAMINATION:  Vital Signs: /56   Pulse 72   Temp 36.7 °C (98.1 °F) (Temporal)   Resp 18   Ht 1.702 m (5' 7\")   Wt 81.5 kg (179 lb 10.8 oz)   SpO2 97%     No acute distress  Guaze dressing in place      LABORATORY VALUES:  Recent Labs     10/12/23  0253 10/12/23  2319 10/13/23  0813 10/14/23  0828   WBC 12.4*  --  10.9* 10.0   RBC 3.05*  --  3.22* 3.33*   HEMOGLOBIN 9.1* 9.0* 9.3* 9.6*   HEMATOCRIT 27.9* 28.5* 29.5* 30.5*   MCV 91.5  --  91.6 91.6   MCH 29.8  --  28.9 28.8   MCHC 32.6  --  31.5* 31.5*   RDW 43.0  --  43.4 43.0   PLATELETCT 670*  --  668* 648*   MPV 8.9*  --  8.8* 8.6*     Recent Labs     10/12/23  2319 10/13/23  0813 10/14/23  0828   SODIUM 136 136 139   POTASSIUM 4.2 3.9 3.8   CHLORIDE 105 104 107   CO2 23 24 24   GLUCOSE 182* 153* 108*   BUN 12 9 6*   CREATININE 0.67 0.61 0.54   CALCIUM 8.2* 8.1* 8.1*                IMAGING:  OUTSIDE IMAGES-DX CHEST   Final Result      OUTSIDE IMAGES-CT ABDOMEN /PELVIS   Final Result          ASSESSMENT AND PLAN:  Necrotizing soft tissue infection- (present on admission)  Assessment & Plan  10/11 I&D with betadine packing done  10/12 OR for irrigation and sharp debridement. Unable to place wound vac.  10/14 Wound team to attempt to place wound vac.  Wound care dressing changes and wound team.   Antibiotics per Medicine team. Cultures pending. Preliminary with staph and urogenital johnie.      History of DVT (deep vein thrombosis)- (present on admission)  Assessment & Plan  Patient denies recently taking Xarelto however our pharmacist here called her SNF and she is still taking her medication.           " ____________________________________     SANDRA Vargas    DD: 10/14/2023  9:05 AM

## 2023-10-14 NOTE — PROGRESS NOTES
Infectious Disease Progress Note    Author: Nelia Flores M.D. Date & Time of service: 10/14/2023  7:55 AM    Chief Complaint:  Follow-up for necrotizing infection of the perineum and labia    Interval History:  10/14 patient afebrile, no labs available for review this morning.  Cultures are growing Staph aureus and Bacteroides fragilis.  Plan for wound VAC placement today    Labs Reviewed, Medications Reviewed, and Wound Reviewed.    Review of Systems:  Review of Systems   Constitutional:  Positive for malaise/fatigue. Negative for chills and fever.   Musculoskeletal:  Positive for myalgias.       Hemodynamics:  Temp (24hrs), Av.8 °C (98.2 °F), Min:36.6 °C (97.9 °F), Max:37 °C (98.6 °F)  Temperature: 36.8 °C (98.2 °F)  Pulse  Av.8  Min: 64  Max: 104   Blood Pressure: 136/75       Physical Exam:  Physical Exam  Vitals and nursing note reviewed.   Constitutional:       Appearance: She is ill-appearing.   Eyes:      Pupils: Pupils are equal, round, and reactive to light.   Cardiovascular:      Rate and Rhythm: Normal rate.   Pulmonary:      Effort: Pulmonary effort is normal. No respiratory distress.   Abdominal:      Comments: Left groin dressed   Skin:     General: Skin is warm.   Neurological:      Mental Status: She is alert.   Psychiatric:         Mood and Affect: Mood normal.         Behavior: Behavior normal.         Meds:    Current Facility-Administered Medications:     insulin regular **AND** POC blood glucose manual result **AND** NOTIFY MD and PharmD **AND** Administer 20 grams of glucose (approximately 8 ounces of fruit juice) every 15 minutes PRN FSBG less than 70 mg/dL **AND** dextrose bolus    insulin GLARGINE    lidocaine jelly    lidocaine **OR** lidocaine    rivaroxaban    morphine injection    oxyCODONE immediate-release    morphine injection    linezolid    senna-docusate **AND** polyethylene glycol/lytes **AND** magnesium hydroxide **AND** bisacodyl    LR    []  piperacillin-tazobactam **AND** piperacillin-tazobactam    Labs:  Recent Labs     10/12/23  0253 10/12/23  2319 10/13/23  0813   WBC 12.4*  --  10.9*   RBC 3.05*  --  3.22*   HEMOGLOBIN 9.1* 9.0* 9.3*   HEMATOCRIT 27.9* 28.5* 29.5*   MCV 91.5  --  91.6   MCH 29.8  --  28.9   RDW 43.0  --  43.4   PLATELETCT 670*  --  668*   MPV 8.9*  --  8.8*     Recent Labs     10/12/23  0253 10/12/23  2319 10/13/23  0813   SODIUM 132* 136 136   POTASSIUM 4.2 4.2 3.9   CHLORIDE 100 105 104   CO2 23 23 24   GLUCOSE 326* 182* 153*   BUN 17 12 9     Recent Labs     10/12/23  0253 10/12/23  2319 10/13/23  0813   CREATININE 0.82 0.67 0.61       Imaging:  DX-CHEST-PORTABLE (1 VIEW)    Result Date: 9/23/2023 9/23/2023 7:30 AM HISTORY/REASON FOR EXAM:  Shortness of Breath. TECHNIQUE/EXAM DESCRIPTION AND NUMBER OF VIEWS: Single portable view of the chest. COMPARISON: One view chest 2/3/2023 FINDINGS: Projections Greek. Bony structures are unremarkable. Heart size is estimated normal. Pulmonary vascularity is normal. Lung fields are clear. There is no effusion. There is peculiar curvilinear lucency at soft tissue shadow superimposed over the left hemithorax. Suspect skin folds. Morphology is not indicative of pneumothorax.     1.  No acute cardiopulmonary disease. 2.  Peculiar curvilinear lucency and soft tissue shadows project over the left hemithorax. This is not thought to represent pneumothorax. If there is clinical concern based on physical exam, repeat chest x-ray could be obtained.      Micro:  Results       Procedure Component Value Units Date/Time    CULTURE WOUND W/ GRAM STAIN [[id number]]  (Abnormal) Collected: 10/10/23 2240    Order Status: Completed Specimen: Wound Updated: 10/13/23 1654     Significant Indicator POS     Source WND     Site Left Groin     Culture Result Moderate growth usual urogenital johnie including enteric  johnie.       Gram Stain Result Moderate WBCs.  Many mixed bacteria, no predominant organism seen.        Culture Result Staphylococcus aureus  Light growth  PBP2 in progress      Narrative:      Surgery - swabs received    Anaerobic Culture [[id number]]  (Abnormal) Collected: 10/10/23 2244    Order Status: Completed Specimen: Wound Updated: 10/13/23 1658     Significant Indicator POS     Source WND     Site Left Groin     Culture Result -      Bacteroides fragilis  Heavy growth      Narrative:      Surgery - swabs received    Fungal Culture [[id number]] Collected: 10/10/23 2244    Order Status: Completed Specimen: Wound Updated: 10/13/23 1658     Significant Indicator NEG     Source WND     Site Left Groin     Culture Result Culture in progress.     Fungal Smear Results No fungal elements seen.    Narrative:      Surgery - swabs received    GRAM STAIN [[id number]] Collected: 10/10/23 2244    Order Status: Completed Specimen: Wound Updated: 10/11/23 1948     Significant Indicator .     Source WND     Site Left Groin     Gram Stain Result Moderate WBCs.  Many mixed bacteria, no predominant organism seen.      Narrative:      Surgery - swabs received    Fungal Smear [[id number]] Collected: 10/10/23 2244    Order Status: Completed Specimen: Wound Updated: 10/11/23 1948     Significant Indicator NEG     Source WND     Site Left Groin     Fungal Smear Results No fungal elements seen.    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  Patient  developed a wound above her groin that started to spontaneously drain with purulent fluid.  She was initially seen at the outside hospital and was treated with IV fluids and Zosyn and then transferred to [facility] for higher level of care due to concerns for possible Annia's.  She is now status post irrigation and debridement on 10/10/2023.  Per the operative note, she was noted to have an abscess cavity in the left groin with pus tracking throughout the tissues immediately across the midline overlying the probe symphysis, inferior laterally into the left labia and superolaterally onto the left lower abdominal wall.  She returned to the OR on 10/12/2023 and underwent sharp excisional debridement of skin, subcutaneous fat and fascia with partial delayed primary closure of the wound.  Operative cultures on 10/10 are growing usual urogenital johnie including enteric johnie and Staphylococcus aureus and Bacteroides fragilis.    Pertinent diagnoses:  Necrotizing infection of the perineum and labia  Polymicrobial bacterial infection (Staphylococcus aureus, Bacteroides fragilis)  Uncontrolled type 2 diabetes mellitus, hemoglobin A1c 13.4  Leukocytosis, improving  Thrombocytosis     Plan:  -Continue IV Zosyn and oral linezolid for now pending Staph aureus susceptibilities  -If MSSA, okay to transition to IV Unasyn while in the hospital  -Plan a 2-week course of antibiotics from 10/12 with stop date of 10/24/2023  -Anticipate transition to oral Augmentin 875 mg twice daily to complete antibiotic course above at discharge  -Diabetes education and blood sugar control  -Continue wound care-plan for wound VAC placement today     No ID clinic follow-up needed    Plan of care discussed with hospitalist, Dr. Bell.  ID will continue to follow

## 2023-10-14 NOTE — PROGRESS NOTES
4 Eyes Skin Assessment Completed by Mayra, RN & CARIE Lala      Head WDL  Ears WDL  Nose WDL  Mouth no teeth  Neck WDL  Breast/Chest WDL   Shoulder Blades WDL   Spine  WDL  (R) Arm/Elbow/Hand WDL  (L) Arm/Elbow/Hand WDL  Abdomen wound vac in place, pictures uploaded to [employer]  Groin wound vac in place, pictures uploaded to media  Scrotum/Coccyx/Buttocks red, blanching, wound following sacral stage 2 pressure injury   (R) Leg WDL  (L) Leg  WDL  (R) Heel/Foot/Toe WDL  (L) Heel/Foot/Toe WDL     Pictures Uploaded Into Epic yes  Wound Consult Placed yes  RN Wound Prevention Protocol Ordered yes

## 2023-10-14 NOTE — PROGRESS NOTES
Bedside report received, assessment completed    A&O x  4, pt calls appropriately   - Anxious/ tearful  Mobility: Up with x2 assist, FWW  Fall Risk Assessment: High, bed alarm yes, door notifications yes  Pain Assessment / Reassessment completed, medication provided per MAR  Diet: CHO, tolerating   LDA:   IV Access: BL 20G, CDI/ flushed/ SL/ Infusing LR 75ml/hr  Wound Vac: Lower abdomen/groin   GI/: palomares void, + flatus,  10/14, incontinence BM  DVT Prophylaxis: xarelto, SCD on while in bed   Rene Score: 17, Interventions per flow sheet  Procedures:    - 10/10 I&D   - 10/12 I&D  D/C Plan:    - ID consult for Abx   - Wound Vac application today 10/14    Reviewed plan of care with patient, bed in lowest position and locked, pt resting comfortably now, call light within reach, all needs met at this time. Interventions will be executed per plan of care

## 2023-10-14 NOTE — CARE PLAN
The patient is Stable - Low risk of patient condition declining or worsening    Shift Goals  Clinical Goals: manage patients pain, change dressings per order, skin integrity  Patient Goals: Pain control, rest  Family Goals: not present at this time    Progress made toward(s) clinical / shift goals:        Problem: Knowledge Deficit - Standard  Goal: Patient and family/care givers will demonstrate understanding of plan of care, disease process/condition, diagnostic tests and medications  Outcome: Progressing     Problem: Hemodynamics  Goal: Patient's hemodynamics, fluid balance and neurologic status will be stable or improve  Outcome: Progressing     Problem: Fluid Volume  Goal: Fluid volume balance will be maintained  Outcome: Progressing

## 2023-10-14 NOTE — PROGRESS NOTES
4 Eyes Skin Assessment Completed by ***, RN and ***, RN.    Head WDL  Ears WDL  Nose WDL  Mouth WDL  Neck WDL  Breast/Chest WDL  Shoulder Blades WDL  Spine WDL  (R) Arm/Elbow/Hand {(R) Arm/Elbow/Hand:25074}  (L) Arm/Elbow/Hand {(L) Arm/Elbow/Hand:31759}  Abdomen {Abdomen:22698}  Groin {Groin:97904}  Scrotum/Coccyx/Buttocks {Scrotum/Coccyx/Buttocks:61592}  (R) Leg WDL  (L) Leg WDL  (R) Heel/Foot/Toe {(R) Heel/Foot/Toe:45710}  (L) Heel/Foot/Toe {(L) Heel/Foot/Toe:89253}          Devices In Places Tele Box, Pulse Ox, Vivas, SCD's, and Nasal Cannula      Interventions In Place NC W/Ear Foams, Heel Mepilex, Sacral Mepilex, Waffle Overlay, TAP System, Pillows, Q2 Turns, and Pressure Redistribution Mattress    Possible Skin Injury Yes    Pictures Uploaded Into Epic Yes  Wound Consult Placed Yes  RN Wound Prevention Protocol Ordered Yes

## 2023-10-15 LAB
BACTERIA WND AEROBE CULT: ABNORMAL
BACTERIA WND AEROBE CULT: ABNORMAL
GLUCOSE BLD STRIP.AUTO-MCNC: 122 MG/DL (ref 65–99)
GLUCOSE BLD STRIP.AUTO-MCNC: 126 MG/DL (ref 65–99)
GLUCOSE BLD STRIP.AUTO-MCNC: 234 MG/DL (ref 65–99)
GLUCOSE BLD STRIP.AUTO-MCNC: 234 MG/DL (ref 65–99)
GLUCOSE BLD STRIP.AUTO-MCNC: 69 MG/DL (ref 65–99)
GLUCOSE BLD STRIP.AUTO-MCNC: 77 MG/DL (ref 65–99)
GRAM STN SPEC: ABNORMAL
SIGNIFICANT IND 70042: ABNORMAL
SITE SITE: ABNORMAL
SOURCE SOURCE: ABNORMAL

## 2023-10-15 PROCEDURE — 700105 HCHG RX REV CODE 258: Performed by: INTERNAL MEDICINE

## 2023-10-15 PROCEDURE — 99232 SBSQ HOSP IP/OBS MODERATE 35: CPT | Performed by: STUDENT IN AN ORGANIZED HEALTH CARE EDUCATION/TRAINING PROGRAM

## 2023-10-15 PROCEDURE — 82962 GLUCOSE BLOOD TEST: CPT | Mod: 91

## 2023-10-15 PROCEDURE — 99233 SBSQ HOSP IP/OBS HIGH 50: CPT | Performed by: INTERNAL MEDICINE

## 2023-10-15 PROCEDURE — 700111 HCHG RX REV CODE 636 W/ 250 OVERRIDE (IP): Mod: JZ | Performed by: STUDENT IN AN ORGANIZED HEALTH CARE EDUCATION/TRAINING PROGRAM

## 2023-10-15 PROCEDURE — 700102 HCHG RX REV CODE 250 W/ 637 OVERRIDE(OP): Performed by: STUDENT IN AN ORGANIZED HEALTH CARE EDUCATION/TRAINING PROGRAM

## 2023-10-15 PROCEDURE — A9270 NON-COVERED ITEM OR SERVICE: HCPCS | Performed by: STUDENT IN AN ORGANIZED HEALTH CARE EDUCATION/TRAINING PROGRAM

## 2023-10-15 PROCEDURE — 700111 HCHG RX REV CODE 636 W/ 250 OVERRIDE (IP): Mod: JZ | Performed by: INTERNAL MEDICINE

## 2023-10-15 PROCEDURE — 99232 SBSQ HOSP IP/OBS MODERATE 35: CPT | Performed by: NURSE PRACTITIONER

## 2023-10-15 PROCEDURE — 700102 HCHG RX REV CODE 250 W/ 637 OVERRIDE(OP): Performed by: INTERNAL MEDICINE

## 2023-10-15 PROCEDURE — 770001 HCHG ROOM/CARE - MED/SURG/GYN PRIV*

## 2023-10-15 PROCEDURE — A9270 NON-COVERED ITEM OR SERVICE: HCPCS | Performed by: INTERNAL MEDICINE

## 2023-10-15 RX ADMIN — RIVAROXABAN 10 MG: 10 TABLET, FILM COATED ORAL at 18:28

## 2023-10-15 RX ADMIN — AMPICILLIN AND SULBACTAM 3 G: 1; 2 INJECTION, POWDER, FOR SOLUTION INTRAMUSCULAR; INTRAVENOUS at 18:29

## 2023-10-15 RX ADMIN — AMPICILLIN AND SULBACTAM 3 G: 1; 2 INJECTION, POWDER, FOR SOLUTION INTRAMUSCULAR; INTRAVENOUS at 12:51

## 2023-10-15 RX ADMIN — LINEZOLID 600 MG: 600 TABLET, FILM COATED ORAL at 18:28

## 2023-10-15 RX ADMIN — OXYCODONE HYDROCHLORIDE 10 MG: 10 TABLET ORAL at 06:22

## 2023-10-15 RX ADMIN — AMPICILLIN AND SULBACTAM 3 G: 1; 2 INJECTION, POWDER, FOR SOLUTION INTRAMUSCULAR; INTRAVENOUS at 08:27

## 2023-10-15 RX ADMIN — OXYCODONE HYDROCHLORIDE 10 MG: 10 TABLET ORAL at 11:10

## 2023-10-15 RX ADMIN — MORPHINE SULFATE 3 MG: 4 INJECTION, SOLUTION INTRAMUSCULAR; INTRAVENOUS at 20:41

## 2023-10-15 RX ADMIN — PIPERACILLIN AND TAZOBACTAM 3.38 G: 3; .375 INJECTION, POWDER, FOR SOLUTION INTRAVENOUS at 06:19

## 2023-10-15 RX ADMIN — OXYCODONE HYDROCHLORIDE 10 MG: 10 TABLET ORAL at 23:15

## 2023-10-15 RX ADMIN — MORPHINE SULFATE 3 MG: 4 INJECTION, SOLUTION INTRAMUSCULAR; INTRAVENOUS at 08:35

## 2023-10-15 RX ADMIN — OXYCODONE HYDROCHLORIDE 10 MG: 10 TABLET ORAL at 17:53

## 2023-10-15 RX ADMIN — AMPICILLIN AND SULBACTAM 3 G: 1; 2 INJECTION, POWDER, FOR SOLUTION INTRAMUSCULAR; INTRAVENOUS at 23:17

## 2023-10-15 RX ADMIN — LINEZOLID 600 MG: 600 TABLET, FILM COATED ORAL at 06:22

## 2023-10-15 ASSESSMENT — COGNITIVE AND FUNCTIONAL STATUS - GENERAL
SUGGESTED CMS G CODE MODIFIER DAILY ACTIVITY: CK
HELP NEEDED FOR BATHING: A LOT
CLIMB 3 TO 5 STEPS WITH RAILING: A LOT
STANDING UP FROM CHAIR USING ARMS: A LOT
MOVING FROM LYING ON BACK TO SITTING ON SIDE OF FLAT BED: A LOT
DAILY ACTIVITIY SCORE: 16
DRESSING REGULAR LOWER BODY CLOTHING: A LOT
SUGGESTED CMS G CODE MODIFIER MOBILITY: CL
MOVING TO AND FROM BED TO CHAIR: A LOT
WALKING IN HOSPITAL ROOM: A LOT
DRESSING REGULAR UPPER BODY CLOTHING: A LOT
TURNING FROM BACK TO SIDE WHILE IN FLAT BAD: A LOT
TOILETING: A LOT
MOBILITY SCORE: 12

## 2023-10-15 ASSESSMENT — LIFESTYLE VARIABLES: SUBSTANCE_ABUSE: 0

## 2023-10-15 ASSESSMENT — ENCOUNTER SYMPTOMS
BLURRED VISION: 0
INSOMNIA: 0
NAUSEA: 0
SHORTNESS OF BREATH: 0
BACK PAIN: 0
MYALGIAS: 1
COUGH: 0
EYE PAIN: 0
PALPITATIONS: 0
CHILLS: 0
DIZZINESS: 0
FEVER: 0
VOMITING: 0
HEADACHES: 0
ABDOMINAL PAIN: 0

## 2023-10-15 ASSESSMENT — PAIN DESCRIPTION - PAIN TYPE
TYPE: ACUTE PAIN
TYPE: ACUTE PAIN
TYPE: ACUTE PAIN;SURGICAL PAIN
TYPE: ACUTE PAIN
TYPE: ACUTE PAIN;SURGICAL PAIN
TYPE: ACUTE PAIN

## 2023-10-15 NOTE — PROGRESS NOTES
Hypoglycemia Intervention    Hypoglycemia protocol intervention:  Blood glucose 0625 at 69.  Intervention: 8 oz of fruit juice   Repeat blood glucose 0645 at 77.  Intervention: 4 oz of fruit juice

## 2023-10-15 NOTE — CARE PLAN
The patient is Stable - Low risk of patient condition declining or worsening    Shift Goals  Clinical Goals: pain control, wound vac management, skin integrity preservation, monitor blood glucose  Patient Goals: pain control  Family Goals: not currently present    Progress made toward(s) clinical / shift goals:  Patient's wound vac remains in place and is functioning appropriately. Pain is managed per MAR. Patient's blood glucose is being monitored prior to meals and at bedtime. Hypoglycemic and hyperglycemic interventions remain available.     Patient is not progressing towards the following goals: Pending discharge placement.    Problem: Knowledge Deficit - Standard  Goal: Patient and family/care givers will demonstrate understanding of plan of care, disease process/condition, diagnostic tests and medications  Outcome: Progressing     Problem: Respiratory  Goal: Patient will achieve/maintain optimum respiratory ventilation and gas exchange  Outcome: Progressing     Problem: Physical Regulation  Goal: Diagnostic test results will improve  Outcome: Progressing  Goal: Signs and symptoms of infection will decrease  Outcome: Progressing     Problem: Pain - Standard  Goal: Alleviation of pain or a reduction in pain to the patient’s comfort goal  Outcome: Progressing     Problem: Skin Integrity  Goal: Skin integrity is maintained or improved  Outcome: Progressing     Problem: Fall Risk  Goal: Patient will remain free from falls  Outcome: Progressing

## 2023-10-15 NOTE — PROGRESS NOTES
Pt is AxOx4; on room air.  Denies SOB/chest pain  + BLE due to neuropathy numbness or tingling  Verbalizes 6/10 pain; PRN pain meds in use per MAR.  Skin per flowsheets. Wound vac to vivienne area from thigh to abd  Sacral stage 2 pressure injury - wound orders in place  Denies N/V. Tolerating diabetic diet.  +void (into palomares catheter); LBM 10/14 incontinent  Pt can be OOB with x2-3 assist using a FWW  SCDs on to BLE; xarelto in use

## 2023-10-15 NOTE — PROGRESS NOTES
Hospital Medicine Daily Progress Note    Date of Service  10/15/2023    Chief Complaint  Fannie Driver is a 63 y.o. female admitted 10/10/2023 with tissue infection.    Hospital Course  Fannie Driver is a 63 y.o. 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  Patient reports she had a wound above groin which started spontaneously draining purulent discharge.  She was given IV Zosyn and IV fluids prior to arrival however they recommended transfer given CT findings of possible gas gangrene/foreign years gangrene.  I have consulted general surgery who plans to take her for incision and drainage in OR tonight.  I have kept her n.p.o. and have ordered additional antibiotics including vancomycin and clindamycin.  Admitted to medicine service.    Interval Problem Update  No acute events overnight.  Wound vac placed yesterday. Patient tolerating wound care well.  Cultures growing MRSA.  ID recommend zyvox and unasyn, end date 10/26. Can transition to zyvox and augmentin on discharge.  Patient is medically cleared to discharge to SNF for ongoing PT/OT and wound care.    I have discussed this patient's plan of care and discharge plan at IDT rounds today with Case Management, Nursing, Nursing leadership, and other members of the IDT team.    Consultants/Specialty  general surgery    Code Status  Full Code    Disposition  The patient is medically cleared for discharge to home or a post-acute facility.  Anticipate discharge to: skilled nursing facility    I have placed the appropriate orders for post-discharge needs.    Review of Systems  Review of Systems   Constitutional:  Negative for chills and fever.   Eyes:  Negative for blurred vision and pain.   Respiratory:  Negative for cough and shortness of breath.    Cardiovascular:  Negative for chest pain, palpitations and leg swelling.    Gastrointestinal:  Negative for abdominal pain, nausea and vomiting.   Genitourinary:  Negative for dysuria and urgency.   Musculoskeletal:  Negative for back pain.   Skin:  Negative for itching and rash.   Neurological:  Negative for dizziness and headaches.   Psychiatric/Behavioral:  Negative for substance abuse. The patient does not have insomnia.         Physical Exam  Temp:  [36.6 °C (97.9 °F)-37.1 °C (98.8 °F)] 36.6 °C (97.9 °F)  Pulse:  [67-77] 68  Resp:  [12-18] 17  BP: (114-135)/(60-73) 135/73  SpO2:  [93 %-96 %] 96 %    Physical Exam  Vitals reviewed.   Constitutional:       General: She is not in acute distress.     Appearance: She is not diaphoretic.   HENT:      Head: Normocephalic and atraumatic.      Right Ear: External ear normal.      Left Ear: External ear normal.      Nose: Nose normal. No congestion.      Mouth/Throat:      Pharynx: No oropharyngeal exudate or posterior oropharyngeal erythema.   Eyes:      Extraocular Movements: Extraocular movements intact.      Pupils: Pupils are equal, round, and reactive to light.   Cardiovascular:      Rate and Rhythm: Normal rate and regular rhythm.   Pulmonary:      Effort: Pulmonary effort is normal.      Breath sounds: Normal breath sounds.   Abdominal:      General: Bowel sounds are normal. There is no distension.      Palpations: Abdomen is soft.      Tenderness: There is no abdominal tenderness. There is no guarding.   Musculoskeletal:         General: Deformity present. No swelling. Normal range of motion.      Cervical back: Normal range of motion and neck supple.   Skin:     General: Skin is warm and dry.   Neurological:      General: No focal deficit present.      Mental Status: She is alert and oriented to person, place, and time.      Cranial Nerves: No cranial nerve deficit.      Sensory: No sensory deficit.      Motor: No weakness.   Psychiatric:         Mood and Affect: Mood normal.         Behavior: Behavior normal.          Fluids    Intake/Output Summary (Last 24 hours) at 10/15/2023 1215  Last data filed at 10/15/2023 1149  Gross per 24 hour   Intake 1893.87 ml   Output 2800 ml   Net -906.13 ml       Laboratory  Recent Labs     10/12/23  2319 10/13/23  0813 10/14/23  0828   WBC  --  10.9* 10.0   RBC  --  3.22* 3.33*   HEMOGLOBIN 9.0* 9.3* 9.6*   HEMATOCRIT 28.5* 29.5* 30.5*   MCV  --  91.6 91.6   MCH  --  28.9 28.8   MCHC  --  31.5* 31.5*   RDW  --  43.4 43.0   PLATELETCT  --  668* 648*   MPV  --  8.8* 8.6*     Recent Labs     10/12/23  2319 10/13/23  0813 10/14/23  0828   SODIUM 136 136 139   POTASSIUM 4.2 3.9 3.8   CHLORIDE 105 104 107   CO2 23 24 24   GLUCOSE 182* 153* 108*   BUN 12 9 6*   CREATININE 0.67 0.61 0.54   CALCIUM 8.2* 8.1* 8.1*                     Imaging  OUTSIDE IMAGES-DX CHEST   Final Result      OUTSIDE IMAGES-CT ABDOMEN /PELVIS   Final Result           Assessment/Plan  Necrotizing soft tissue infection- (present on admission)  Assessment & Plan  S/p surgical debridement of left groin, abdominal wall, labia on 10/10  S/p debridement 10/12  Culture growing MRSA and bacteroides  ID recommend zyvox and unasyn (can transition unasyn to augmentin on discharge), end date 10/26  Wound vac placed  No plans for further surgery    History of DVT (deep vein thrombosis)- (present on admission)  Assessment & Plan  Completed acute DVT treatment per pharmacy records review, now on prophylactic dose xarelto at home  Continue prophylactic dose xarelto    Diabetes mellitus (HCC)- (present on admission)  Assessment & Plan  SSI   lantus 30 U daily for now with ISS; reportedly taking 44U lantus daily at home in addition to ISS  Monitor blood sugars, adjust insulin regimen as needed         VTE prophylaxis: xarelto ppx

## 2023-10-15 NOTE — PROGRESS NOTES
Infectious Disease Progress Note    Author: Nelia Flores M.D. Date & Time of service: 10/15/2023  7:55 AM    Chief Complaint:  Follow-up for necrotizing infection of the perineum and labia    Interval History:  10/14 patient afebrile, no labs available for review this morning.  Cultures are growing Staph aureus and Bacteroides fragilis.  Plan for wound VAC placement today  10/15 patient afebrile, cultures with MRSA.  Patient was seen by wound care yesterday and the wound was noted to be clean.  Plan of care regarding antibiotic course discussed with patient in detail today.  She does not have any worsening left groin pain.  No nausea or vomiting    Labs Reviewed, Medications Reviewed, and Wound Reviewed.    Review of Systems:  Review of Systems   Constitutional:  Positive for malaise/fatigue. Negative for chills and fever.   Respiratory:  Negative for cough and shortness of breath.    Gastrointestinal:  Negative for nausea and vomiting.   Musculoskeletal:  Positive for myalgias.       Hemodynamics:  Temp (24hrs), Av.8 °C (98.2 °F), Min:36.6 °C (97.9 °F), Max:37 °C (98.6 °F)  Temperature: 37 °C (98.6 °F), Monitored Temp: 37 °C (98.6 °F)  Pulse  Av  Min: 64  Max: 104   Blood Pressure: 114/60       Physical Exam:  Physical Exam  Vitals and nursing note reviewed.   Constitutional:       Appearance: She is ill-appearing.   Eyes:      Pupils: Pupils are equal, round, and reactive to light.   Cardiovascular:      Rate and Rhythm: Normal rate.   Pulmonary:      Effort: Pulmonary effort is normal. No respiratory distress.   Abdominal:      Comments: Left groin dressed   Skin:     General: Skin is warm.   Neurological:      Mental Status: She is alert.   Psychiatric:         Mood and Affect: Mood normal.         Behavior: Behavior normal.       Meds:    Current Facility-Administered Medications:     insulin GLARGINE    insulin regular **AND** POC blood glucose manual result **AND** NOTIFY MD and PharmD **AND**  Administer 20 grams of glucose (approximately 8 ounces of fruit juice) every 15 minutes PRN FSBG less than 70 mg/dL **AND** dextrose bolus    [] piperacillin-tazobactam **AND** piperacillin-tazobactam    oxyCODONE immediate-release    lidocaine jelly    lidocaine **OR** lidocaine    rivaroxaban    morphine injection    linezolid    senna-docusate **AND** polyethylene glycol/lytes **AND** magnesium hydroxide **AND** bisacodyl    Labs:  Recent Labs     10/12/23  2319 10/13/23  0813 10/14/23  0828   WBC  --  10.9* 10.0   RBC  --  3.22* 3.33*   HEMOGLOBIN 9.0* 9.3* 9.6*   HEMATOCRIT 28.5* 29.5* 30.5*   MCV  --  91.6 91.6   MCH  --  28.9 28.8   RDW  --  43.4 43.0   PLATELETCT  --  668* 648*   MPV  --  8.8* 8.6*       Recent Labs     10/12/23  2319 10/13/23  0813 10/14/23  0828   SODIUM 136 136 139   POTASSIUM 4.2 3.9 3.8   CHLORIDE 105 104 107   CO2 23 24 24   GLUCOSE 182* 153* 108*   BUN 12 9 6*       Recent Labs     10/12/23  2319 10/13/23  0813 10/14/23  0828   CREATININE 0.67 0.61 0.54         Imaging:  DX-CHEST-PORTABLE (1 VIEW)    Result Date: 2023 7:30 AM HISTORY/REASON FOR EXAM:  Shortness of Breath. TECHNIQUE/EXAM DESCRIPTION AND NUMBER OF VIEWS: Single portable view of the chest. COMPARISON: One view chest 2/3/2023 FINDINGS: Projections Indian. Bony structures are unremarkable. Heart size is estimated normal. Pulmonary vascularity is normal. Lung fields are clear. There is no effusion. There is peculiar curvilinear lucency at soft tissue shadow superimposed over the left hemithorax. Suspect skin folds. Morphology is not indicative of pneumothorax.     1.  No acute cardiopulmonary disease. 2.  Peculiar curvilinear lucency and soft tissue shadows project over the left hemithorax. This is not thought to represent pneumothorax. If there is clinical concern based on physical exam, repeat chest x-ray could be obtained.      Micro:  Results       Procedure Component Value Units Date/Time    CULTURE  WOUND W/ GRAM STAIN [[id number]]  (Abnormal)  (Susceptibility) Collected: 10/10/23 2244    Order Status: Completed Specimen: Wound Updated: 10/15/23 0728     Significant Indicator POS     Source WND     Site Left Groin     Culture Result Moderate growth usual urogenital johnie     Gram Stain Result Moderate WBCs.  Many mixed bacteria, no predominant organism seen.       Culture Result Methicillin Resistant Staphylococcus aureus  Light growth      Narrative:      Surgery - swabs received    Susceptibility       Methicillin resistant staphylococcus aureus (1)       Antibiotic Interpretation Microscan   Method Status    Azithromycin Resistant >4 mcg/mL ROBERT Final    Clindamycin Sensitive 0.5 mcg/mL ROBERT Final    Cefazolin Resistant <=8 mcg/mL ROBERT Final    Cefepime Resistant >16 mcg/mL ROBERT Final    Daptomycin Sensitive 1 mcg/mL ROBERT Final    Ampicillin/sulbactam Resistant 16/8 mcg/mL ROBERT Final    Erythromycin Resistant >4 mcg/mL ROBERT Final    Vancomycin Sensitive 1 mcg/mL ROBERT Final    Oxacillin Resistant >2 mcg/mL ROBERT Final    Trimeth/Sulfa Sensitive <=0.5/9.5 mcg/mL ROBERT Final    Tetracycline Sensitive <=4 mcg/mL ROBERT Final                       Anaerobic Culture [[id number]]  (Abnormal) Collected: 10/10/23 2244    Order Status: Completed Specimen: Wound Updated: 10/15/23 0728     Significant Indicator POS     Source WND     Site Left Groin     Culture Result Heavy growth mixed anaerobes, predominantly      Bacteroides fragilis    Narrative:      Surgery - swabs received    Fungal Culture [[id number]] Collected: 10/10/23 2244    Order Status: Completed Specimen: Wound Updated: 10/15/23 0728     Significant Indicator NEG     Source WND     Site Left Groin     Culture Result Culture in progress.     Fungal Smear Results No fungal elements seen.    Narrative:      Surgery - swabs received    GRAM STAIN [[id number]] Collected: 10/10/23 2244    Order Status: Completed Specimen: Wound Updated: 10/11/23 1948     Significant Indicator .      Source WND     Site Left Groin     Gram Stain Result Moderate WBCs.  Many mixed bacteria, no predominant organism seen.      Narrative:      Surgery - swabs received    Fungal Smear [[id number]] Collected: 10/10/23 2244    Order Status: Completed Specimen: Wound Updated: 10/11/23 1948     Significant Indicator NEG     Source WND     Site Left Groin     Fungal Smear Results No fungal elements seen.    Narrative:      Surgery - swabs received    Blood Culture [[id number]]     Order Status: No result Specimen: Blood from Peripheral     Blood Culture [[id number]]     Order Status: No result Specimen: Blood from Peripheral     Urinalysis [[id number]]     Order Status: No result Specimen: Urine     MRSA By PCR (Amp) [[id number]] Collected: 10/10/23 0000    Order Status: Canceled Specimen: Respirate from Nares             Assessment:  63-year-old woman with a history of uncontrolled type 2 diabetes mellitus admitted on 10/10/2023 transferred from [facility] secondary to 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  Patient developed a wound above her groin that started to spontaneously drain with purulent fluid.  She was initially seen at the outside hospital and was treated with IV fluids and Zosyn and then transferred to [facility] for higher level of care due to concerns for possible Annia's.  She is now status post irrigation and debridement on 10/10/2023.  Per the operative note, she was noted to have an abscess cavity in the left groin with pus tracking throughout the tissues immediately across the midline overlying the probe symphysis, inferior laterally into the left labia and superolaterally onto the left lower abdominal wall.  She returned to the OR on 10/12/2023 and underwent sharp excisional debridement of skin, subcutaneous fat and fascia with partial delayed primary closure of the wound.  Operative cultures on 10/10 are growing usual urogenital johnie including enteric johnie and  Staphylococcus aureus and Bacteroides fragilis.    Pertinent diagnoses:  Necrotizing infection of the perineum and labia  Polymicrobial bacterial infection (MRSA, Bacteroides fragilis)  Uncontrolled type 2 diabetes mellitus, hemoglobin A1c 13.4  Leukocytosis, resolved  Thrombocytosis     Plan:  -Discontinue IV Zosyn  -Transition to IV Unasyn  -Continue oral linezolid 600 mg twice daily  -Monitor CBC while on linezolid  -Plan a 2-week course of antibiotics from 10/12 with stop date of 10/26/2023  -Anticipate transition to oral Augmentin 875 mg twice daily plus linezolid 600 mg twice daily to complete antibiotic course above at discharge  -Diabetes education and blood sugar control  -Continue wound care-patient seen by wound care yesterday and was noted to have clean surgical wound     No ID clinic follow-up needed    Plan of care discussed with hospitalist, Dr. Bell.  ID signing off.  Please reconsult if needed

## 2023-10-15 NOTE — DISCHARGE PLANNING
Case Management Discharge Planning    Admission Date: 10/10/2023  GMLOS: 6.9  ALOS: 5    6-Clicks ADL Score: 16  6-Clicks Mobility Score: 12  PT and/or OT Eval ordered: Yes  Post-acute Referrals Ordered: Yes  Post-acute Choice Obtained: Yes  Has referral(s) been sent to post-acute provider:  Yes      Anticipated Discharge Dispo: Discharge Disposition: Still a Patient (30)  Discharge Contact Phone Number: [phone]    DME Needed: No    Action(s) Taken: OTHER    Discussed this case during IDT Rounds. Per MD, patient is medically cleared for post acute placement. Per MR, referral sent to [facility] in [place], [place], pending response.Per Jacquie, Charge RN, patient on PO ABX and Wound Vac.     LSW spoke with patient via phone. Patient indicates she does not want to return to [facility]. Patient stated she would rather transfer to a facility near [place], [place]; however, patient is willing to stay in a local SNF.     Patient provided verbal consent for this LSW to send the referral to all the facilities in [facility], choice form faxed to DPA.    12:00pm - Per DPA, [facility] ACCEPTED. Per DPA, [facility] might have a bed available tomorrow.    PASRR File # [id number]    Escalations Completed: None    Medically Clear: Yes    Next Steps: SNF, pending accepting facility.    Barriers to Discharge: Pending Placement    Is the patient up for discharge tomorrow:

## 2023-10-15 NOTE — DISCHARGE PLANNING
Received choice form at: 0919  Agency/Facility name: [facility]  Referral sent per choice form at:  1015

## 2023-10-15 NOTE — PROGRESS NOTES
4 Eyes Skin Assessment Completed by CARIE Sage and CARIE Lopez.    Head WDL  Ears WDL  Nose WDL  Mouth no teeth; no dentures  Neck WDL  Breast/Chest WDL   Shoulder Blades WDL   Spine WDL  (R) Arm/Elbow/Hand WDL  (L) Arm/Elbow/Hand WDL  Abdomen wound vac in place  Scrotum/Coccyx/Buttocks red, blanching, pressure injury   (R) Leg WDL  (L) Leg  WDL  (R) Heel/Foot/Toe WDL  (L) Heel/Foot/Toe WDL      Devices In Places Pulse Ox and SCD's and Tele box      Interventions In Place TAP System, Pillows, Q2 Turns, Low Air Loss Mattress, ZFlo Pillow, and Dri-Bret Pads    Possible Skin Injury Yes    Pictures Uploaded Into Epic Yes

## 2023-10-15 NOTE — PROGRESS NOTES
"  DATE: 10/15/2023    Hospital day 4 Necrotizing soft tissue infection   10/10 Debridement of left groin, left labia, and left lower abdominal wall  10/12 Sharp excision debridement with partial closure  INTERVAL EVENTS:  Wound vac placed   No acute surgical needs at this time  Will follow peripherally         PHYSICAL EXAMINATION:  Vital Signs: /72   Pulse 72   Temp 37.1 °C (98.8 °F) (Temporal)   Resp 18   Ht 1.702 m (5' 7\")   Wt 81.5 kg (179 lb 10.8 oz)   SpO2 95%     Physical Exam  Vitals and nursing note reviewed.   Eyes:      Pupils: Pupils are equal, round, and reactive to light.   Cardiovascular:      Rate and Rhythm: Normal rate.   Pulmonary:      Effort: Pulmonary effort is normal. No respiratory distress.   Abdominal:      Comments: Wound vac   Skin:     General: Skin is warm.   Neurological:      Mental Status: She is alert.   Psychiatric:         Mood and Affect: Mood normal.         Behavior: Behavior normal.       .    LABORATORY VALUES:  Recent Labs     10/12/23  2319 10/13/23  0813 10/14/23  0828   WBC  --  10.9* 10.0   RBC  --  3.22* 3.33*   HEMOGLOBIN 9.0* 9.3* 9.6*   HEMATOCRIT 28.5* 29.5* 30.5*   MCV  --  91.6 91.6   MCH  --  28.9 28.8   MCHC  --  31.5* 31.5*   RDW  --  43.4 43.0   PLATELETCT  --  668* 648*   MPV  --  8.8* 8.6*     Recent Labs     10/12/23  2319 10/13/23  0813 10/14/23  0828   SODIUM 136 136 139   POTASSIUM 4.2 3.9 3.8   CHLORIDE 105 104 107   CO2 23 24 24   GLUCOSE 182* 153* 108*   BUN 12 9 6*   CREATININE 0.67 0.61 0.54   CALCIUM 8.2* 8.1* 8.1*                IMAGING:  OUTSIDE IMAGES-DX CHEST   Final Result      OUTSIDE IMAGES-CT ABDOMEN /PELVIS   Final Result          ASSESSMENT AND PLAN:  Necrotizing soft tissue infection- (present on admission)  Assessment & Plan  10/11 I&D with betadine packing done  10/12 OR for irrigation and sharp debridement. Unable to place wound vac in OR.  10/14 Wound team placed wound vac.  Wound care dressing changes and wound team. "   Antibiotics per Medicine team. Cultures pending. Preliminary with staph and urogenital johnie.      History of DVT (deep vein thrombosis)- (present on admission)  Assessment & Plan  Patient denies recently taking Xarelto however our pharmacist here called her SNF and she is still taking her medication.           ____________________________________     JEREMIAS Vargas.    DD: 10/15/2023  8:09 AM

## 2023-10-16 LAB
GLUCOSE BLD STRIP.AUTO-MCNC: 134 MG/DL (ref 65–99)
GLUCOSE BLD STRIP.AUTO-MCNC: 146 MG/DL (ref 65–99)
GLUCOSE BLD STRIP.AUTO-MCNC: 159 MG/DL (ref 65–99)
GLUCOSE BLD STRIP.AUTO-MCNC: 234 MG/DL (ref 65–99)

## 2023-10-16 PROCEDURE — 97166 OT EVAL MOD COMPLEX 45 MIN: CPT

## 2023-10-16 PROCEDURE — 97605 NEG PRS WND THER DME<=50SQCM: CPT

## 2023-10-16 PROCEDURE — 700102 HCHG RX REV CODE 250 W/ 637 OVERRIDE(OP): Performed by: INTERNAL MEDICINE

## 2023-10-16 PROCEDURE — 82962 GLUCOSE BLOOD TEST: CPT

## 2023-10-16 PROCEDURE — A9270 NON-COVERED ITEM OR SERVICE: HCPCS | Performed by: STUDENT IN AN ORGANIZED HEALTH CARE EDUCATION/TRAINING PROGRAM

## 2023-10-16 PROCEDURE — 700101 HCHG RX REV CODE 250: Performed by: STUDENT IN AN ORGANIZED HEALTH CARE EDUCATION/TRAINING PROGRAM

## 2023-10-16 PROCEDURE — A9270 NON-COVERED ITEM OR SERVICE: HCPCS | Performed by: INTERNAL MEDICINE

## 2023-10-16 PROCEDURE — 700105 HCHG RX REV CODE 258: Performed by: INTERNAL MEDICINE

## 2023-10-16 PROCEDURE — 700111 HCHG RX REV CODE 636 W/ 250 OVERRIDE (IP): Mod: JZ | Performed by: INTERNAL MEDICINE

## 2023-10-16 PROCEDURE — 770020 HCHG ROOM/CARE - TELE (206)

## 2023-10-16 PROCEDURE — 700102 HCHG RX REV CODE 250 W/ 637 OVERRIDE(OP): Performed by: STUDENT IN AN ORGANIZED HEALTH CARE EDUCATION/TRAINING PROGRAM

## 2023-10-16 PROCEDURE — 97602 WOUND(S) CARE NON-SELECTIVE: CPT

## 2023-10-16 PROCEDURE — 700111 HCHG RX REV CODE 636 W/ 250 OVERRIDE (IP): Mod: JZ | Performed by: STUDENT IN AN ORGANIZED HEALTH CARE EDUCATION/TRAINING PROGRAM

## 2023-10-16 PROCEDURE — 97163 PT EVAL HIGH COMPLEX 45 MIN: CPT

## 2023-10-16 PROCEDURE — 302098 PASTE RING (FLAT): Performed by: STUDENT IN AN ORGANIZED HEALTH CARE EDUCATION/TRAINING PROGRAM

## 2023-10-16 PROCEDURE — 99232 SBSQ HOSP IP/OBS MODERATE 35: CPT | Performed by: STUDENT IN AN ORGANIZED HEALTH CARE EDUCATION/TRAINING PROGRAM

## 2023-10-16 RX ORDER — MORPHINE SULFATE 4 MG/ML
3 INJECTION INTRAVENOUS EVERY 4 HOURS PRN
Status: DISCONTINUED | OUTPATIENT
Start: 2023-10-16 | End: 2023-10-17 | Stop reason: HOSPADM

## 2023-10-16 RX ORDER — HYDROCODONE BITARTRATE AND ACETAMINOPHEN 5; 325 MG/1; MG/1
2 TABLET ORAL EVERY 4 HOURS PRN
Status: DISCONTINUED | OUTPATIENT
Start: 2023-10-16 | End: 2023-10-17 | Stop reason: HOSPADM

## 2023-10-16 RX ORDER — HYDROCODONE BITARTRATE AND ACETAMINOPHEN 5; 325 MG/1; MG/1
2 TABLET ORAL EVERY 6 HOURS PRN
Status: DISCONTINUED | OUTPATIENT
Start: 2023-10-16 | End: 2023-10-16

## 2023-10-16 RX ADMIN — AMPICILLIN AND SULBACTAM 3 G: 1; 2 INJECTION, POWDER, FOR SOLUTION INTRAMUSCULAR; INTRAVENOUS at 17:15

## 2023-10-16 RX ADMIN — MORPHINE SULFATE 3 MG: 4 INJECTION, SOLUTION INTRAMUSCULAR; INTRAVENOUS at 09:49

## 2023-10-16 RX ADMIN — MORPHINE SULFATE 3 MG: 4 INJECTION, SOLUTION INTRAMUSCULAR; INTRAVENOUS at 03:37

## 2023-10-16 RX ADMIN — HYDROCODONE BITARTRATE AND ACETAMINOPHEN 2 TABLET: 5; 325 TABLET ORAL at 20:53

## 2023-10-16 RX ADMIN — LINEZOLID 600 MG: 600 TABLET, FILM COATED ORAL at 06:29

## 2023-10-16 RX ADMIN — LIDOCAINE HYDROCHLORIDE 1 APPLICATION: 40 SOLUTION TOPICAL at 11:20

## 2023-10-16 RX ADMIN — RIVAROXABAN 10 MG: 10 TABLET, FILM COATED ORAL at 17:11

## 2023-10-16 RX ADMIN — MORPHINE SULFATE 3 MG: 4 INJECTION, SOLUTION INTRAMUSCULAR; INTRAVENOUS at 17:12

## 2023-10-16 RX ADMIN — OXYCODONE HYDROCHLORIDE 10 MG: 10 TABLET ORAL at 11:29

## 2023-10-16 RX ADMIN — OXYCODONE HYDROCHLORIDE 10 MG: 10 TABLET ORAL at 06:29

## 2023-10-16 RX ADMIN — AMPICILLIN AND SULBACTAM 3 G: 1; 2 INJECTION, POWDER, FOR SOLUTION INTRAMUSCULAR; INTRAVENOUS at 11:30

## 2023-10-16 RX ADMIN — AMPICILLIN AND SULBACTAM 3 G: 1; 2 INJECTION, POWDER, FOR SOLUTION INTRAMUSCULAR; INTRAVENOUS at 06:30

## 2023-10-16 RX ADMIN — LINEZOLID 600 MG: 600 TABLET, FILM COATED ORAL at 17:11

## 2023-10-16 RX ADMIN — HYDROCODONE BITARTRATE AND ACETAMINOPHEN 2 TABLET: 5; 325 TABLET ORAL at 14:06

## 2023-10-16 ASSESSMENT — COGNITIVE AND FUNCTIONAL STATUS - GENERAL
SUGGESTED CMS G CODE MODIFIER DAILY ACTIVITY: CK
DRESSING REGULAR UPPER BODY CLOTHING: A LOT
TURNING FROM BACK TO SIDE WHILE IN FLAT BAD: UNABLE
DAILY ACTIVITIY SCORE: 14
MOBILITY SCORE: 8
MOVING FROM LYING ON BACK TO SITTING ON SIDE OF FLAT BED: UNABLE
TOILETING: A LOT
CLIMB 3 TO 5 STEPS WITH RAILING: TOTAL
STANDING UP FROM CHAIR USING ARMS: A LOT
DRESSING REGULAR LOWER BODY CLOTHING: A LOT
WALKING IN HOSPITAL ROOM: A LOT
SUGGESTED CMS G CODE MODIFIER MOBILITY: CM
HELP NEEDED FOR BATHING: A LOT
PERSONAL GROOMING: A LITTLE
EATING MEALS: A LITTLE
MOVING TO AND FROM BED TO CHAIR: UNABLE

## 2023-10-16 ASSESSMENT — ENCOUNTER SYMPTOMS
HEADACHES: 0
INSOMNIA: 0
BACK PAIN: 0
BLURRED VISION: 0
SHORTNESS OF BREATH: 0
EYE PAIN: 0
NAUSEA: 0
DIZZINESS: 0
CHILLS: 0
ABDOMINAL PAIN: 0
VOMITING: 0
COUGH: 0
PALPITATIONS: 0
FEVER: 0

## 2023-10-16 ASSESSMENT — PAIN DESCRIPTION - PAIN TYPE
TYPE: ACUTE PAIN
TYPE: ACUTE PAIN;SURGICAL PAIN
TYPE: ACUTE PAIN;SURGICAL PAIN
TYPE: ACUTE PAIN
TYPE: ACUTE PAIN;SURGICAL PAIN

## 2023-10-16 ASSESSMENT — ACTIVITIES OF DAILY LIVING (ADL): TOILETING: INDEPENDENT

## 2023-10-16 ASSESSMENT — LIFESTYLE VARIABLES: SUBSTANCE_ABUSE: 0

## 2023-10-16 NOTE — PROGRESS NOTES
4 Eyes Skin Assessment Completed by CARIE Meyers and CARIE Dhillon.    Head WDL  Ears WDL  Nose WDL  Mouth WDL - no teeth; no dentures  Neck WDL  Breast/Chest WDL  Shoulder Blades WDL  Spine WDL  (R) Arm/Elbow/Hand WDL  (L) Arm/Elbow/Hand WDL  Abdomen Incision - wound vac in place  Groin - Vivas in place; wound vac present  Scrotum/Coccyx/Buttocks Redness and Blanching; PI  (R) Leg WDL  (L) Leg WDL  (R) Heel/Foot/Toe WDL  (L) Heel/Foot/Toe WDL          Devices In Places Blood Pressure Cuff, Pulse Ox, and Vivas; Tele Box      Interventions In Place TAP System, Pillows, Q2 Turns, Low Air Loss Mattress, Barrier Cream, ZFlo Pillow, and Dri-Bret Pads    Possible Skin Injury No - previously charted    Pictures Uploaded Into Epic Yes - previously  Wound Consult Placed Yes - previously  RN Wound Prevention Protocol Ordered Yes - previously

## 2023-10-16 NOTE — DISCHARGE PLANNING
0824  [facility]ency/Facility Name: [facility]  Outcome: DPA left voicemail for admissions.    0948  Agency/Facility Name: [facility]  Outcome: DPA left voicemail for Jacqueline.    1006  [facility]Facility Name: [facility]  Spoke To: Jacqueline  Outcome: Per Jacqueline she is working on Billing information with [facility]. Per Jacqueline is to update CM when she has more info.  RN HANNAH notified.    1255  [facility]ency[facility]Facility Name: [facility]  Outcome: DPA left voicemail for admissions.    1319  Agency/Facility Name: [facility]  Outcome: DPA left voicemail for Jacqueline.    1331  Agency/Facility Name: [facility]  Spoke To: Pop  Outcome: Per Pop medically accepted Pt. Per Pop needs updated PT/OT notes and wanted to know if Pt can D/C on a different wound vac.  RN CM notified.    1421  [facility]/Facility Name: [facility]  Spoke To: Jacqueline  Outcome: Per Jacqueline waiting to hear back from [facility] Billing Department.    1549  Agency/Facility Name: [facility]  Spoke To: Jacqueline  Outcome: Per Jacqueline Pt does not have the 3 days of Tele that's required billed to Pt for Medicare INS.  RN HANNAH notified.

## 2023-10-16 NOTE — PROGRESS NOTES
Hospital Medicine Daily Progress Note    Date of Service  10/16/2023    Chief Complaint  Fannie Driver is a 63 y.o. female admitted 10/10/2023 with tissue infection.    Hospital Course  Fannie Driver is a 63 y.o. 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  Patient reports she had a wound above groin which started spontaneously draining purulent discharge.  She was given IV Zosyn and IV fluids prior to arrival however they recommended transfer given CT findings of possible gas gangrene/foreign years gangrene.  I have consulted general surgery who plans to take her for incision and drainage in OR tonight.  I have kept her n.p.o. and have ordered additional antibiotics including vancomycin and clindamycin.  Admitted to medicine service.    Interval Problem Update  No acute events overnight.  Pain regimen adjusted.  Continue antibiotics, end date 10/26. Can transition to oral antibiotics on discharge.  Patient is medically cleared to discharge to SNF for ongoing PT/OT and wound care.    I have discussed this patient's plan of care and discharge plan at IDT rounds today with Case Management, Nursing, Nursing leadership, and other members of the IDT team.    Consultants/Specialty  general surgery    Code Status  Full Code    Disposition  The patient is medically cleared for discharge to home or a post-acute facility.  Anticipate discharge to: skilled nursing facility    I have placed the appropriate orders for post-discharge needs.    Review of Systems  Review of Systems   Constitutional:  Negative for chills and fever.   Eyes:  Negative for blurred vision and pain.   Respiratory:  Negative for cough and shortness of breath.    Cardiovascular:  Negative for chest pain, palpitations and leg swelling.   Gastrointestinal:  Negative for abdominal pain, nausea and vomiting.    Genitourinary:  Negative for dysuria and urgency.   Musculoskeletal:  Negative for back pain.   Skin:  Negative for itching and rash.   Neurological:  Negative for dizziness and headaches.   Psychiatric/Behavioral:  Negative for substance abuse. The patient does not have insomnia.         Physical Exam  Temp:  [36.4 °C (97.6 °F)-37 °C (98.6 °F)] 36.6 °C (97.9 °F)  Pulse:  [69-88] 69  Resp:  [16] 16  BP: (118-134)/(60-77) 134/77  SpO2:  [93 %-97 %] 97 %    Physical Exam  Vitals reviewed.   Constitutional:       General: She is not in acute distress.     Appearance: She is not diaphoretic.   HENT:      Head: Normocephalic and atraumatic.      Right Ear: External ear normal.      Left Ear: External ear normal.      Nose: Nose normal. No congestion.      Mouth/Throat:      Pharynx: No oropharyngeal exudate or posterior oropharyngeal erythema.   Eyes:      Extraocular Movements: Extraocular movements intact.      Pupils: Pupils are equal, round, and reactive to light.   Cardiovascular:      Rate and Rhythm: Normal rate and regular rhythm.   Pulmonary:      Effort: Pulmonary effort is normal.      Breath sounds: Normal breath sounds.   Abdominal:      General: Bowel sounds are normal. There is no distension.      Palpations: Abdomen is soft.      Tenderness: There is no abdominal tenderness. There is no guarding.   Musculoskeletal:         General: Deformity present. No swelling. Normal range of motion.      Cervical back: Normal range of motion and neck supple.   Skin:     General: Skin is warm and dry.   Neurological:      General: No focal deficit present.      Mental Status: She is alert and oriented to person, place, and time.      Cranial Nerves: No cranial nerve deficit.      Sensory: No sensory deficit.      Motor: No weakness.   Psychiatric:         Mood and Affect: Mood normal.         Behavior: Behavior normal.         Fluids    Intake/Output Summary (Last 24 hours) at 10/16/2023 7607  Last data filed at  10/16/2023 1026  Gross per 24 hour   Intake 420 ml   Output 3085 ml   Net -2665 ml         Laboratory  Recent Labs     10/14/23  0828   WBC 10.0   RBC 3.33*   HEMOGLOBIN 9.6*   HEMATOCRIT 30.5*   MCV 91.6   MCH 28.8   MCHC 31.5*   RDW 43.0   PLATELETCT 648*   MPV 8.6*       Recent Labs     10/14/23  0828   SODIUM 139   POTASSIUM 3.8   CHLORIDE 107   CO2 24   GLUCOSE 108*   BUN 6*   CREATININE 0.54   CALCIUM 8.1*                       Imaging  OUTSIDE IMAGES-DX CHEST   Final Result      OUTSIDE IMAGES-CT ABDOMEN /PELVIS   Final Result           Assessment/Plan  Necrotizing soft tissue infection- (present on admission)  Assessment & Plan  S/p surgical debridement of left groin, abdominal wall, labia on 10/10  S/p debridement 10/12  Culture growing MRSA and bacteroides  ID recommend zyvox and unasyn (can transition unasyn to augmentin on discharge), end date 10/26  Wound vac placed  No plans for further surgery    History of DVT (deep vein thrombosis)- (present on admission)  Assessment & Plan  Completed acute DVT treatment per pharmacy records review, now on prophylactic dose xarelto at home  Continue prophylactic dose xarelto    Diabetes mellitus (HCC)- (present on admission)  Assessment & Plan  SSI   lantus 30 U daily for now with ISS; reportedly taking 44U lantus daily at home in addition to ISS  Monitor blood sugars, adjust insulin regimen as needed         VTE prophylaxis: xarelto ppx

## 2023-10-16 NOTE — PROGRESS NOTES
4 Eyes Skin Assessment Completed by CARIE Sage and CARIE Muse.       Head WDL  Ears WDL  Nose WDL  Mouth no teeth; no dentures  Neck WDL  Breast/Chest WDL   Shoulder Blades WDL   Spine WDL; mepilex to mid back  (R) Arm/Elbow/Hand WDL  (L) Arm/Elbow/Hand WDL  Abdomen low mid abd wound vac in place  Coccyx/Buttocks red, blanching, pressure injury   (R) Leg WDL  (L) Leg  WDL  (R) Heel/Foot/Toe WDL  (L) Heel/Foot/Toe WDL      Devices In Places Pulse Ox and SCD's and Tele box      Barrier paste, cream and wipes.  Interventions In Place Sacral Mepilex, Heel Float Boots, Waffle Overlay, TAP System, Pillows, Elbow Mepilex, Q2 Turns, Barrier Cream, Dri-Bret Pads, and Heels Loaded W/Pillows    Possible Skin Injury Yes    Pictures Uploaded Into Epic Yes

## 2023-10-16 NOTE — PROGRESS NOTES
Pt is AxOx4; on room air.  Denies SOB/chest pain.  + BLE due to neuropathy numbness or tingling  Verbalizes 8/10 pain; PRN pain meds in use per MAR.  Skin per flowsheets. Wound vac to vivienne area from thigh to abd  Sacral stage 2 pressure injury - wound orders in place  Denies N/V. Tolerating diabetic diet.  +void (into palomares catheter); LBM 10/14 incontinent  Pt can be OOB with x2-3 assist using a FWW  SCDs on to BLE; xarelto in use for VTE prophylaxis

## 2023-10-16 NOTE — DISCHARGE PLANNING
Case Management Discharge Planning   Melody contacted x 3 today regarding status of placement. They are working on the financials/  Admission Date: 10/10/2023  GMLOS: 6.9  ALOS: 6    6-Clicks ADL Score: 14  6-Clicks Mobility Score: 8  PT and/or OT Eval ordered: Yes  Post-acute Referrals Ordered: Yes  Post-acute Choice Obtained: Yes  Has referral(s) been sent to post-acute provider:  Yes      Anticipated Discharge Dispo: Discharge Disposition: Still a Patient (30)  Discharge Contact Phone Number: [phone]    DME Needed: No    Action(s) Taken: Updated Provider/Nurse on Discharge Plan    Escalations Completed: Pending Discharge Destination, Transportation Vendor, and Insurance     Medically Clear: Yes    Next Steps:     Barriers to Discharge: Pending Placement, Pending Insurance Authorization, and Transportation    Is the patient up for discharge tomorrow: Yes    Is transport arranged for discharge disposition: No

## 2023-10-16 NOTE — THERAPY
"Physical Therapy   Initial Evaluation     Patient Name: Fannie Driver  Age:  63 y.o., Sex:  female  Medical Record #: [id number]  Today's Date: 10/16/2023     Precautions  Precautions: Fall Risk  Comments: anterior pelvic wound with vac    Assessment  Patient is a 63 y.o. female who was admitted with a necrotizing tissue infection of the L groin, abdominal wall, and labia s/p debridement 10/10 and 10/12. PMH significant for diabetes, DVT, amphetamine use, pancreatitis.    Pt received in bed and crying out in pain. After medication from RN, pt agreeable to PT evaluation. Pt required overall mod A for bed mobility, STS, and taking side steps at EOB. Pt emotional during this session due to multiple social situations, including her sister selling the home she was living in and reportedly putting her dog down secondary to pt not being there to take care of it. RN aware of this as well. Pt will benefit from continued acute PT and rehab prior to return home. Pt reports eventual plan is to go live with her daughter and grandkids in [place], [place].    Plan    Physical Therapy Initial Treatment Plan   Treatment Plan : Bed Mobility, Gait Training, Neuro Re-Education / Balance, Self Care / Home Evaluation, Therapeutic Activities, Therapeutic Exercise  Treatment Frequency: 3 Times per Week  Duration: Until Therapy Goals Met    DC Equipment Recommendations: Unable to determine at this time  Discharge Recommendations: Recommend post-acute placement for additional physical therapy services prior to discharge home       Subjective    \"My sister killed my dog and now she wants to sell the house I was living in. She put my dog down because I've been in the hospital\"     Objective       10/16/23 1010   Precautions   Precautions Fall Risk   Comments anterior pelvic wound with vac   Vitals   Vitals Comments Lightheadedness in standing, improved with sitting, seemed to be related to pt holding breath   Pain 0 - 10 Group   Therapist Pain " Assessment Nurse Notified;7;Prior to Activity  (medicated with morphine at beginning of session and pain improved)   Prior Living Situation   Prior Services Home-Independent   Housing / Facility Skilled Nursing Facility;1 Story House   Steps Into Home 1   Steps In Home 0   Equipment Owned 4-Wheel Walker   Lives with - Patient's Self Care Capacity Adult Children;Attendant / Paid Care Giver   Comments Prior to 9/25 admission, pt was at home in a SSH with her son. Pt discharged 9/27 to [facility] in [place]. Pt reports her sister is now trying to sell the home she was living in, so her future plan is to go live with her daughter in [place], [place] in a ground floor apartment.   Prior Level of Functional Mobility   Bed Mobility Independent   Transfer Status Independent   Ambulation Independent   Assistive Devices Used 4-Wheel Walker   Comments Prior to recent illnesses, was independent with 4WW   History of Falls   History of Falls No   Cognition    Level of Consciousness Alert   Comments Pt crying in pain and emotionally labile at beginning of session due to social stressors. By end of session, pt calm and pleasant.   Passive ROM Lower Body   Passive ROM Lower Body WDL   Active ROM Lower Body    Comments Limited by weakness   Strength Lower Body   Comments Generalized LE weakness, 3-/5 overall   Sensation Lower Body   Comments Reports numbness and tingling in feet only since being hospitalized   Lower Body Muscle Tone   Lower Body Muscle Tone  WDL   Balance Assessment   Sitting Balance (Static) Fair   Sitting Balance (Dynamic) Fair -   Standing Balance (Static) Fair -   Standing Balance (Dynamic) Poor +   Weight Shift Sitting Fair   Weight Shift Standing Fair   Comments with FWW in standing   Bed Mobility    Supine to Sit Moderate Assist   Sit to Supine Moderate Assist   Scooting Minimal Assist   Rolling Moderate Assist to Rt.   Comments HOB elevated, use of bed rail, able to supine scoot in bed without assist   Gait  Analysis   Comments Side steps x3ft at EOB with mod A and FWW   Functional Mobility   Sit to Stand Moderate Assist   Bed, Chair, Wheelchair Transfer Refused   Mobility EOB, STSx2, side steps   How much difficulty does the patient currently have...   Turning over in bed (including adjusting bedclothes, sheets and blankets)? 1   Sitting down on and standing up from a chair with arms (e.g., wheelchair, bedside commode, etc.) 1   Moving from lying on back to sitting on the side of the bed? 1   How much help from another person does the patient currently need...   Moving to and from a bed to a chair (including a wheelchair)? 2   Need to walk in a hospital room? 2   Climbing 3-5 steps with a railing? 1   6 clicks Mobility Score 8   Short Term Goals    Short Term Goal # 1 Pt will be able to perform bed mobility and sup < >sit SBA in 6 visits.   Short Term Goal # 2 Pt will be able to perform sit <> stand and transfer with FWW SBA in 6 visits.   Short Term Goal # 3 Pt will be able to ambulate 100 ft with FWW Jennifer in 6 visits.   Education Group   Role of Physical Therapist Patient Response Patient;Acceptance;Explanation;Verbal Demonstration   Physical Therapy Initial Treatment Plan    Treatment Plan  Bed Mobility;Gait Training;Neuro Re-Education / Balance;Self Care / Home Evaluation;Therapeutic Activities;Therapeutic Exercise   Treatment Frequency 3 Times per Week   Duration Until Therapy Goals Met   Problem List    Problems Pain;Impaired Bed Mobility;Impaired Transfers;Impaired Ambulation;Functional Strength Deficit;Decreased Activity Tolerance   Anticipated Discharge Equipment and Recommendations   DC Equipment Recommendations Unable to determine at this time   Discharge Recommendations Recommend post-acute placement for additional physical therapy services prior to discharge home   Interdisciplinary Plan of Care Collaboration   IDT Collaboration with  Nursing   Patient Position at End of Therapy In Bed;Tray Table within  Reach;Call Light within Reach;Phone within Reach   Collaboration Comments RN updated

## 2023-10-16 NOTE — THERAPY
Occupational Therapy   Initial Evaluation     Patient Name: Fannie Driver  Age:  63 y.o., Sex:  female  Medical Record #: [id number]  Today's Date: 10/16/2023    Precautions: Fall Risk  Comments: anterior pelvic wound with vac    Assessment  Patient is 63 y.o. female admitted with necrotizing tissue infection of the left groin, abdominal wall, and labia. Pt seen s/p I&D (10/10 and 10/12). PMHx of diabetes, DVT, amphetamine use, and pancreatitis. Pt seen for OT eval. Pt was recently discharged to [facility] where she was receiving assist with her ADLs and IADLs. Prior to that, pt was residing with her son in a 1-story house and was independent with her ADLs and IADLs.    During OT  eval, pt primarily impacted by pain, decreased activity tolerance, decreased functional mobility, balance deficits, and generalized weakness which affected her ability to complete ADLs independently. She required max A to complete LB dressing and supv to complete seated ADLs. Pt declined further OOB activity. At this time recommend post-acute placement prior to DC home. Will continue to follow for ongoing acute OT services.     Plan    Occupational Therapy Initial Treatment Plan   Treatment Interventions: Self Care / Activities of Daily Living, Adaptive Equipment, Neuro Re-Education / Balance, Therapeutic Exercises, Therapeutic Activity  Treatment Frequency: 3 Times per Week  Duration: Until Therapy Goals Met    DC Equipment Recommendations: Unable to determine at this time  Discharge Recommendations: Recommend post-acute placement for additional occupational therapy services prior to discharge home      Objective      Prior Living Situation   Prior Services Home-Independent   Housing / Facility 1 Story House   Steps Into Home 2   Steps In Home 0   Bathroom Set up Bathtub / Shower Combination;Tub Transfer Bench;Grab Bars   Equipment Owned Front-Wheel Walker;Tub Transfer Bench;Grab Bar(s) In Tub / Shower   Lives with - Patient's Self Care  Capacity Adult Children   Comments Pt currently resides with her adult son who is able to provide assist as needed. Pt was recently discharged to [facility] (9/27). Pt reports that her sister is attempting to sell the pt's home as the pt plans to move in with her daughter in [place], [place].    Prior Level of ADL Function   Self Feeding Independent   Grooming / Hygiene Independent   Bathing Independent   Dressing Independent   Toileting Independent   Prior Level of IADL Function   Medication Management Independent   Laundry Independent   Kitchen Mobility Independent   Finances Independent   Home Management Independent   Shopping Independent   Prior Level Of Mobility Independent With Device in Community;Independent With Device in Home   Precautions   Precautions Fall Risk   Comments anterior pelvic wound with vac   Vitals   Vitals Comments Reported lightheadedness when standing. Reported that symptoms had resolved with sitting   Pain 0 - 10 Group   Therapist Pain Assessment Post Activity Pain Same as Prior to Activity;Nurse Notified  (Not rated, pt crying due to pain at start of session. RN provided pt with morphine at start of session. Did not c/o pain during session)   Non Verbal Descriptors   Non Verbal Scale  Crying   Cognition    Level of Consciousness Alert   Comments Pleasant and cooperative   Active ROM Upper Body   Active ROM Upper Body  WDL   Comments Observed during functional activities   Balance Assessment   Sitting Balance (Static) Fair   Sitting Balance (Dynamic) Fair -   Standing Balance (Static) Fair -   Standing Balance (Dynamic) Poor +   Weight Shift Sitting Fair   Weight Shift Standing Fair   Comments w/FWW   Bed Mobility    Supine to Sit Moderate Assist   Sit to Supine Moderate Assist   Scooting Minimal Assist   Rolling Moderate Assist to Rt.   Comments HOB elevated, use of rails, able to adjust self in bed w/o assist   ADL Assessment   Eating Modified Independent   Grooming Supervision;Seated  (Face  washing and rinsed mouth with mouth wash)   Upper Body Dressing Minimal Assist  (Due to IV placement)   Lower Body Dressing Maximal Assist  (Don socks)   Toileting Maximal Assist  (Required assist with pericare after completing BM in bed. Ortega)   6 Clicks Daily Activity Score 14   Functional Mobility   Sit to Stand Moderate Assist   Bed, Chair, Wheelchair Transfer Refused   Toilet Transfers Refused   Mobility EOB only, STSx2 with side steps to HOB   Activity Tolerance   Sitting Edge of Bed 15 min   Standing 5 min   Comments Limtied by pain   Patient / Family Goals   Patient / Family Goal #1 To go home   Short Term Goals   Short Term Goal # 1 Pt will complete ADL txfs with CGA   Short Term Goal # 2 Pt will complete LB dressing with CGA using AE PRN   Short Term Goal # 3 Pt will complete standing g/h with CGA   Short Term Goal # 4 Pt will compelte toileting with CGA   Education Group   Education Provided Role of Occupational Therapist;Activities of Daily Living   Role of Occupational Therapist Patient Response Patient;Acceptance;Explanation;Reinforcement Needed   ADL Patient Response Patient;Acceptance;Explanation;Reinforcement Needed

## 2023-10-16 NOTE — WOUND TEAM
[facility]  Inpatient Services  Wound and Skin Care Follow-up    Admission Date: 10/10/2023     Last order of IP CONSULT TO WOUND CARE was found on 10/12/2023 from Hospital Encounter on 10/10/2023     HPI, PMH, SH: Reviewed    Past Surgical History:   Procedure Laterality Date    IRRIGATION & DEBRIDEMENT GENERAL [facility] 10/12/2023    Procedure: SHARP EXCISION AND DEBRIDEMENT OF SKIN, SUBCUTANEUS AND FASCIA 10SQ CM, WOUND - ABDOMINAL WALL WOUND;  Surgeon: Rj Gutiérrez M.D.;  Location: SURGERY [facility];  Service: General    WOUND CLOSURE [facility] Left 10/12/2023    Procedure: PARTIAL DELAYED CLOSURE 8CM, WOUND;  Surgeon: Rj Gutiérrez M.D.;  Location: SURGERY [facility];  Service: General    IRRIGATION & DEBRIDEMENT [facility] 10/10/2023    Procedure: IRRIGATION AND DEBRIDEMENT, WOUND;  Surgeon: Praveen Chisholm M.D.;  Location: SURGERY [facility];  Service: Trauma    DENTAL OSTEOTOMY N/A 2/6/2023    Procedure: I&D OF LEFT BUCCAL ABSCESS; EXTRACTION OF TEETH;  Surgeon: Lore Dukes D.D.S.;  Location: SURGERY SAME DAY [facility];  Service: Oral Surgery    ERCP IN OR  10/30/2009    Performed by CESAR BATES at SURGERY [facility] ORS    CHOLECYSTECTOMY  10/09    OTHER ABDOMINAL SURGERY      tubal ligation     Social History     Tobacco Use    Smoking status: Never    Smokeless tobacco: Never   Substance Use Topics    Alcohol use: Not on file     No chief complaint on file.    Diagnosis: Gangrene (HCC) [I96]    Unit where seen by Wound Team: T426/00     WOUND FOLLOW UP RELATED TO:  Left Mons       WOUND TEAM PLAN OF CARE - Frequency of Follow-up:   Nursing to follow dressing orders written for wound care. Contact wound team if area fails to progress, deteriorates or with any questions/concerns if something comes up before next scheduled follow up (See below as to whether wound is following and frequency of wound follow up)  Dressing changes by wound team:                   NPWT change 3 times  "weekly - MWF    WOUND HISTORY:       \"Necrotizing soft tissue infection  10/10 Debridement of left groin, left labia, and left lower abdominal wall  10/12 Sharp excision debridement with partial closure\"       WOUND ASSESSMENT/LDA  Wound 10/10/23 Full Thickness Wound Open Incision Groin;Labia Left (Active)   Date First Assessed/Time First Assessed: 10/10/23 2323   Primary Wound Type: Full Thickness Wound  Surgical Wound Type: Open Incision  Location: Groin;Labia  Laterality: Left      Assessments 10/16/2023 11:00 AM   Wound Image     Site Assessment Red;Drainage   Periwound Assessment Clean;Dry;Intact   Margins Attached edges;Defined edges   Closure Secondary intention;Surface sutures   Drainage Amount Small   Drainage Description Serosanguineous   Treatments Cleansed;Nonselective debridement;Site care   Wound Cleansing Approved Wound Cleanser   Periwound Protectant No-sting Skin Prep;Paste Ring;Drape   Dressing Status Clean;Dry;Intact   Dressing Changed Changed   Dressing Cleansing/Solutions Not Applicable   Dressing Options Wound Vac;Mepitel One   Dressing Change/Treatment Frequency Monday, Wednesday, Friday, and As Needed   NEXT Dressing Change/Treatment Date 10/18/23   NEXT Weekly Photo (Inpatient Only) 10/18/23   Wound Team Following 3x Weekly   Non-staged Wound Description Full thickness   Shape Irregular triangle   Wound Odor None   WOUND NURSE ONLY - Time Spent with Patient (mins) 60       Negative Pressure Wound Therapy 10/14/23 Labia;Groin (Active)   Placement Date: 10/14/23   Location: Labia;Groin      Assessments 10/16/2023 11:00 AM   Wound Image     NPWT Pump Mode / Pressure Setting Ulta;Continuous;125 mmHg   Dressing Type Medium;Black Foam (Veraflo);Non-adherent   Number of Foam Pieces Used 3   Canister Changed No   NEXT Dressing Change/Treatment Date 10/18/23     Vascular:    TARSHA:   No results found.    Lab Values:    Lab Results   Component Value Date/Time    WBC 10.0 10/14/2023 08:28 AM    RBC 3.33 " (L) 10/14/2023 08:28 AM    HEMOGLOBIN 9.6 (L) 10/14/2023 08:28 AM    HEMATOCRIT 30.5 (L) 10/14/2023 08:28 AM    HBA1C 13.4 (H) 10/10/2023 08:43 PM         Culture Results show:  Recent Results (from the past 720 hour(s))   CULTURE WOUND W/ GRAM STAIN    Collection Time: 10/10/23 10:44 PM    Specimen: Wound   Result Value Ref Range    Significant Indicator POS (POS)     Source WND     Site Left Groin     Culture Result Moderate growth usual urogenital johnie (A)     Gram Stain Result       Moderate WBCs.  Many mixed bacteria, no predominant organism seen.      Culture Result (A)      Methicillin Resistant Staphylococcus aureus  Light growth         Susceptibility    Methicillin resistant staphylococcus aureus - ROBERT     Azithromycin >4 Resistant mcg/mL     Clindamycin 0.5 Sensitive mcg/mL     Cefazolin <=8 Resistant mcg/mL     Cefepime >16 Resistant mcg/mL     Daptomycin 1 Sensitive mcg/mL     Ampicillin/sulbactam 16/8 Resistant mcg/mL     Erythromycin >4 Resistant mcg/mL     Vancomycin 1 Sensitive mcg/mL     Oxacillin >2 Resistant mcg/mL     Trimeth/Sulfa <=0.5/9.5 Sensitive mcg/mL     Tetracycline <=4 Sensitive mcg/mL       Pain Level/Medicated:  4% Lidocaine allowed to dwell on wound bed 30min prior, PO pain medications administered by bedside RN 30min prior, and IV pain medications administered by bedside RN immediately prior (Pt educated that IV medication will not be available on outpatient basis)       INTERVENTIONS BY WOUND TEAM:  Chart and images reviewed. Discussed with bedside RN. All areas of concern (based on picture review, LDA review and discussion with bedside RN) have been thoroughly assessed. Documentation of areas based on significant findings. This RN in to assess patient. Performed standard wound care which includes appropriate positioning, dressing removal and non-selective debridement. Pictures and measurements obtained weekly if/when required.    Wound:  Left Mons  Preparation for Dressing  removal: Dressing soaked with Lidocaine  Cleansed/Non-selectively Debrided with:  Wound cleanser and Gauze  Lindy wound: Cleansed with Wound cleanser and Gauze, Prepped with No Sting and Paste Rings  Primary Dressing:  spiraled black foam and angelica to tunnel and wound bed, mepitel over incision covered with black foam  Secondary (Outer) Dressing: sealed with drape, whole cut and button and trac pad applied    Advanced Wound Care Discharge Planning  Number of Clinicians necessary to complete wound care: 1  Is patient requiring IV pain medications for dressing changes:  Yes  Length of time for dressing change 20 min. (This does not include chart review, pre-medication time, set up, clean up or time spent charting.)    Interdisciplinary consultation: Patient, Bedside RN (Dai), Devang BREAUX (Wound RN).  Pressure injury and staging reviewed with N/A.    EVALUATION / RATIONALE FOR TREATMENT:     Date:  10/16/23  Wound Status:  Wound progressing as expected    Wound bed clean and granular, tunnel under incision.      Date:  10/14/23  Wound Status:  Initial evaluation    S/P excisional debridement and partial closure. Left labial wound fairly clean. Angelica dressing used to tunnel and undermining to absorb destructive components of wound exudate and create an optimal environment for cellular growth. NPWT applied to manage bioburden and exudate, increase oxygenation and granulation tissue production to the area, and assist in wound closure by secondary intention.      Date:  10/11/23  Wound Status:  Initial evaluation    Patient sacrococcygeal area with POA stage 2 pressure injury. Reticular dermis present to wound bed. Thin hydrocolloid applied to maintain occlusive, moist wound healing environment. Waffle overlay ordered for additional offloading.    POA stage 2 pressure injury to left heel still with deflated blister. Offloading dressing applied to prevent further trauma to skin.    R anterior ankle with nonblanching  discoloration. Appears to be scar tissue.         Goals: Steady decrease in wound area and depth weekly.    NURSING PLAN OF CARE ORDERS:  Dressing changes: See Dressing Care orders    NUTRITION RECOMMENDATIONS   Wound Team Recommendations:  N/A     DIET ORDERS (From admission to next 24h)       Start     Ordered    10/12/23 1651  Diet Order Diet: Consistent CHO (Diabetic)  ALL MEALS        Question:  Diet:  Answer:  Consistent CHO (Diabetic)    10/12/23 1653                    PREVENTATIVE INTERVENTIONS:   Q shift Rene - performed per nursing policy  Q shift pressure point assessments - performed per nursing policy    Surface/Positioning  Standard/trauma mattress - Currently in Place  Reposition q 2 hours - Currently in Place  Waffle cushion - Currently in Place    Offloading/Redistribution  Sacral offloading dressing (Silicone dressing) - Currently in Place  Heel offloading dressing (Silicone dressing) - Currently in Place  Float Heels off Bed with Pillows - Currently in Place           Containment/Moisture Prevention    Dri-jamaal pad - Currently in Place    Anticipated discharge plans:  Skilled Nursing        Vac Discharge Needs:  Vac Discharge plan is purely a recommendation from wound team and not a requirement for discharge unless otherwise stated by physician.  Regular Vac in use and continued at discharge

## 2023-10-16 NOTE — WOUND TEAM
Assisted Jacqueline DIALLO (Wound RN), with wound care, non-selective debridement performed using wound cleanser/NS and gauze. Please see Jacqueline DIALLO (Wound RN) wound note for further wound care details.

## 2023-10-17 ENCOUNTER — PATIENT OUTREACH (OUTPATIENT)
Dept: SCHEDULING | Facility: IMAGING CENTER | Age: 63
End: 2023-10-17
Payer: MEDICARE

## 2023-10-17 VITALS
HEART RATE: 69 BPM | RESPIRATION RATE: 18 BRPM | TEMPERATURE: 98.2 F | WEIGHT: 179.68 LBS | DIASTOLIC BLOOD PRESSURE: 76 MMHG | OXYGEN SATURATION: 95 % | SYSTOLIC BLOOD PRESSURE: 124 MMHG | HEIGHT: 67 IN | BODY MASS INDEX: 28.2 KG/M2

## 2023-10-17 LAB
GLUCOSE BLD STRIP.AUTO-MCNC: 131 MG/DL (ref 65–99)
GLUCOSE BLD STRIP.AUTO-MCNC: 194 MG/DL (ref 65–99)

## 2023-10-17 PROCEDURE — 82962 GLUCOSE BLOOD TEST: CPT

## 2023-10-17 PROCEDURE — 700111 HCHG RX REV CODE 636 W/ 250 OVERRIDE (IP): Mod: JZ | Performed by: INTERNAL MEDICINE

## 2023-10-17 PROCEDURE — A9270 NON-COVERED ITEM OR SERVICE: HCPCS | Performed by: STUDENT IN AN ORGANIZED HEALTH CARE EDUCATION/TRAINING PROGRAM

## 2023-10-17 PROCEDURE — A9270 NON-COVERED ITEM OR SERVICE: HCPCS | Performed by: INTERNAL MEDICINE

## 2023-10-17 PROCEDURE — 700102 HCHG RX REV CODE 250 W/ 637 OVERRIDE(OP): Performed by: INTERNAL MEDICINE

## 2023-10-17 PROCEDURE — 700105 HCHG RX REV CODE 258: Performed by: INTERNAL MEDICINE

## 2023-10-17 PROCEDURE — 99231 SBSQ HOSP IP/OBS SF/LOW 25: CPT | Performed by: NURSE PRACTITIONER

## 2023-10-17 PROCEDURE — 700111 HCHG RX REV CODE 636 W/ 250 OVERRIDE (IP): Mod: JZ | Performed by: STUDENT IN AN ORGANIZED HEALTH CARE EDUCATION/TRAINING PROGRAM

## 2023-10-17 PROCEDURE — 700102 HCHG RX REV CODE 250 W/ 637 OVERRIDE(OP): Performed by: STUDENT IN AN ORGANIZED HEALTH CARE EDUCATION/TRAINING PROGRAM

## 2023-10-17 PROCEDURE — 99239 HOSP IP/OBS DSCHRG MGMT >30: CPT | Performed by: STUDENT IN AN ORGANIZED HEALTH CARE EDUCATION/TRAINING PROGRAM

## 2023-10-17 RX ORDER — AMOXICILLIN AND CLAVULANATE POTASSIUM 875; 125 MG/1; MG/1
1 TABLET, FILM COATED ORAL 2 TIMES DAILY
Qty: 18 TABLET | Refills: 0 | Status: ACTIVE
Start: 2023-10-17 | End: 2023-10-26

## 2023-10-17 RX ORDER — HYDROCODONE BITARTRATE AND ACETAMINOPHEN 5; 325 MG/1; MG/1
2 TABLET ORAL EVERY 6 HOURS PRN
Qty: 30 TABLET | Refills: 0 | Status: SHIPPED | OUTPATIENT
Start: 2023-10-17 | End: 2023-10-27

## 2023-10-17 RX ORDER — LINEZOLID 600 MG/1
600 TABLET, FILM COATED ORAL EVERY 12 HOURS
Qty: 18 TABLET | Refills: 0 | Status: ACTIVE
Start: 2023-10-17 | End: 2023-10-26

## 2023-10-17 RX ADMIN — LINEZOLID 600 MG: 600 TABLET, FILM COATED ORAL at 04:45

## 2023-10-17 RX ADMIN — AMPICILLIN AND SULBACTAM 3 G: 1; 2 INJECTION, POWDER, FOR SOLUTION INTRAMUSCULAR; INTRAVENOUS at 05:07

## 2023-10-17 RX ADMIN — MORPHINE SULFATE 3 MG: 4 INJECTION, SOLUTION INTRAMUSCULAR; INTRAVENOUS at 12:32

## 2023-10-17 RX ADMIN — AMPICILLIN AND SULBACTAM 3 G: 1; 2 INJECTION, POWDER, FOR SOLUTION INTRAMUSCULAR; INTRAVENOUS at 00:50

## 2023-10-17 RX ADMIN — HYDROCODONE BITARTRATE AND ACETAMINOPHEN 2 TABLET: 5; 325 TABLET ORAL at 04:45

## 2023-10-17 RX ADMIN — HYDROCODONE BITARTRATE AND ACETAMINOPHEN 2 TABLET: 5; 325 TABLET ORAL at 09:19

## 2023-10-17 RX ADMIN — MORPHINE SULFATE 3 MG: 4 INJECTION, SOLUTION INTRAMUSCULAR; INTRAVENOUS at 00:52

## 2023-10-17 ASSESSMENT — PAIN DESCRIPTION - PAIN TYPE
TYPE: ACUTE PAIN

## 2023-10-17 NOTE — DISCHARGE PLANNING
Case Management Discharge Planning    Admission Date: 10/10/2023  GMLOS: 6.9  ALOS: 7    6-Clicks ADL Score: 14  6-Clicks Mobility Score: 8  PT and/or OT Eval ordered: Yes  Post-acute Referrals Ordered: Yes  Post-acute Choice Obtained: Yes  Has referral(s) been sent to post-acute provider:  Yes      Anticipated Discharge Dispo: Discharge Disposition:   D/T to SNF with Medicare cert in anticipation of skilled care (03)  Discharge Contact Phone Number: [phone]    Action(s) Taken:   [facility] unable to accept because of telemetry has not been billed.  I informed patient of above and that she was accepted to [facility].  Pt agreeable to transfer.  Verbal for [facility] obtained.  Bed available today at [facility].  [facility] requested wet to dry dressing and they will place wound vac at their facility.    Voalte msg to Wound RNJacqueline, Dr. Bell and bedside RNDai.  Transport and [facility] forms faxed to [facility].  Transportation confirmed for 1315 today.  Verbal for [facility] obtained from Dr. Bell.  DC packet given to bedside RNDai.    Medically Clear: Yes    Next Steps: DC    Barriers to Discharge: None

## 2023-10-17 NOTE — DIETARY
Nutrition Update:    Day 7 of admit.  Fannie Driver is a 63 y.o. female with admitting DX of Gangrene    Patient being followed to optimize nutrition.    Current Diet: Consistent CHO (Diabetic)    Problem: Nutritional:  Goal: Achieve adequate nutritional intake  Description: Patient will consume 50% of meals  Outcome: Met    PO per ADL flow sheet has been >50% of meals consistently.

## 2023-10-17 NOTE — DISCHARGE PLANNING
0856  Agency/Facility Name: [facility]  Spoke To: Jacqueline  Outcome: Per Jacqueline [facility] Billing is not reflecting the Tele that's needed for LTAC. Per Jacqueline until that is fixed Pt can not be accepted.  RN CM notified    0909  Agency/Facility Name: [facility]  Outcome: DPA left Pop a voicemail.    0945  Agency/Facility Name: [facility]  Outcome: DPA left voicemail for admissions.     0954  Agency/Facility Name: [facility]  Outcome: DPA received voicemail from Pop w/ [facility].   CARIE YOUNG notified.      1009  Agency/Facility Name: [facility]  Spoke To: Pop  Outcome: DPA informed Pt is still on [facility]ara jamaal wound vac. Per Pop if Pt can be transported w/ wet to dry dressing then she can be accepted.  RN CM notified     3959  Agency/Facility Name: [facility]  Spoke To: Pop  Outcome: DPA in formed Pop that Pt has transpoort set up for 1315 pm via [facility].

## 2023-10-17 NOTE — PROGRESS NOTES
Monitor Summary:     Rhythm: SR  Rate: 63-86  Ectopy: PVC(O), PAC(R)  Measurements: .17/.07/.35                 12 Hour Chart Check

## 2023-10-17 NOTE — PROGRESS NOTES
4 Eyes Skin Assessment Completed by Almaz RN and CARIE Phillips.    Head WDL  Ears WDL  Nose WDL  Mouth WDL  Neck WDL  Breast/Chest WDL  Shoulder Blades WDL  Spine WDL  (R) Arm/Elbow/Hand Redness and Blanching to elbow, mepilex changed; PIV to RW  (L) Arm/Elbow/Hand Redness and Blanching to elbow, mepilex changed; PIV to LFA  Abdomen WV to low abdomen/perineum  Groin WV to low abdomen/perineum  Scrotum/Coccyx/Buttocks IVA, mepilex in place  (R) Leg Edema, Flaky  (L) Leg Edema, Flaky  (R) Heel/Foot/Toe Redness, Blanching, Boggy, and Edema, heel mepilex changed  (L) Heel/Foot/Toe Redness, Blanching, Boggy, and Edema, heel mepilex changed          Devices In Places Tele Box, Blood Pressure Cuff, Pulse Ox, Vivas, and SCD's, WV      Interventions In Place Heel Mepilex, Sacral Mepilex, Heel Float Boots, TAP System, Pillows, Elbow Mepilex, Q2 Turns, Low Air Loss Mattress, Barrier Cream, and Dri-Bret Pads    Possible Skin Injury Yes    Pictures Uploaded Into Epic N/A  Wound Consult Placed N/A, already following  RN Wound Prevention Protocol Ordered Yes

## 2023-10-17 NOTE — CARE PLAN
The patient is Stable - Low risk of patient condition declining or worsening    Shift Goals  Clinical Goals: (P) Pain control, wound care, discharge  Patient Goals: (P) pain control  Family Goals: (P) Discharge to SNF    Progress made toward(s) clinical / shift goals:    Problem: Knowledge Deficit - Standard  Goal: Patient and family/care givers will demonstrate understanding of plan of care, disease process/condition, diagnostic tests and medications  Outcome: Met     Problem: Pain - Standard  Goal: Alleviation of pain or a reduction in pain to the patient’s comfort goal  Outcome: Met

## 2023-10-17 NOTE — PROGRESS NOTES
"Received report from previous shift RN at 1900.  Assessment complete.  A&O x 4. Patient calls appropriately.  Patient ambulates with max assist.   Patient has 7/10 pain. Pain managed with prescribed medications per MAR.  Denies N&V. Tolerating CHO diet.  Skin per flowsheets.  + void via Vivas, + flatus, + BM on 10/16.  Patient denies SOB on room air.  SCD's on.    Patient pleasant and cooperative throughout assessment.  Reviewed plan of care with patient, pt verbalizes understanding. Call light and personal belongings with in reach. Hourly rounding in place. All needs met at this time.  /69   Pulse 77   Temp 37.1 °C (98.8 °F) (Temporal)   Resp 18   Ht 1.702 m (5' 7\")   Wt 81.5 kg (179 lb 10.8 oz)   SpO2 96%   BMI 28.14 kg/m²     "

## 2023-10-17 NOTE — PROGRESS NOTES
Patient discharged to [facility] with prescriptions, all home belongings and a wet to dry dressing in place. IV discontinued. EMS at bedside. Home walker provided. Prescriptions given to patient, verbalized understanding, consent signed and in chart. Discharge instructions given regarding diabetes, wound to pelvis, and follow up.  Education provided, patient asked questions and verbalized understanding. Discharge paperwork signed and in chart. Patient is tolerating diet, stable when ambulating, and pain is well controlled. All belongings collected. No further questions or concerns at this time.

## 2023-10-17 NOTE — CARE PLAN
The patient is Stable - Low risk of patient condition declining or worsening    Shift Goals  Clinical Goals: Pain Control; Skin Integrity; Comfort  Patient Goals: Pain Controls; Rest  Family Goals: not currently present    Progress made toward(s) clinical / shift goals:  Patient medicated per MAR. Non-pharmacologic comfort measures implemented. Safety discussed. Education provided. Ambulation and repositioning encouraged.     Problem: Knowledge Deficit - Standard  Goal: Patient and family/care givers will demonstrate understanding of plan of care, disease process/condition, diagnostic tests and medications  Outcome: Progressing     Problem: Fluid Volume  Goal: Fluid volume balance will be maintained  Outcome: Progressing     Problem: Physical Regulation  Goal: Diagnostic test results will improve  Outcome: Progressing     Problem: Pain - Standard  Goal: Alleviation of pain or a reduction in pain to the patient’s comfort goal  Outcome: Progressing

## 2023-10-17 NOTE — DISCHARGE PLANNING
DC Transport Scheduled    Received request at: 10/17/2023 at 1113    Transport Company Scheduled:  [id number]  Spoke with Jessie at [facility] to schedule transport.    Scheduled Date: 10/17/2023  Scheduled Time: 1315    Destination: [facility] at [address] [facility] [address]     Notified care team of scheduled transport via Voalte.     If there are any changes needed to the DC transportation scheduled, please contact [facility] at ext. [phone] between the hours of 7463-1557 Mon-Fri. If outside those hours, contact the ED Case Manager at ext. [phone].

## 2023-10-17 NOTE — DISCHARGE SUMMARY
Discharge Summary    CHIEF COMPLAINT ON ADMISSION  No chief complaint on file.      Reason for Admission  Fourniers gangrene    Admission Date  10/10/2023     CODE STATUS  Full Code    HPI & HOSPITAL COURSE  Fannie Driver is a 63 y.o. 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  Patient reports she had a wound above groin which started spontaneously draining purulent discharge.  She was given IV Zosyn and IV fluids prior to arrival however they recommended transfer given CT findings of possible gas gangrene/keshia's gangrene.  General surgery consulted on arrival, patient underwent surgical debridement on 10/10 and on 10/12. Wound vac placed. Operative cultures growing MRSA and bacteroides. Infectious disease recommend zyvox and augmentin on discharge, end date 10/26. Patient with wound associated pain controlled with oxycodone. Physical, occupational therapy recommend post acute services. Patient with wound vac and has been accepted to SNF.      Therefore, she is discharged in fair and stable condition to skilled nursing facility.    The patient met 2-midnight criteria for an inpatient stay at the time of discharge.      FOLLOW UP ITEMS POST DISCHARGE  Take medications as prescribed.  Follow up with surgery team and PCP.    DISCHARGE DIAGNOSES  Principal Problem:    Gangrene (HCC) (POA: Yes)  Active Problems:    Diabetes mellitus (HCC) (POA: Yes)    History of DVT (deep vein thrombosis) (POA: Yes)    Necrotizing soft tissue infection (POA: Yes)  Resolved Problems:    * No resolved hospital problems. *      FOLLOW UP  No future appointments.  No follow-up provider specified.    MEDICATIONS ON DISCHARGE     Medication List        START taking these medications        Instructions   amoxicillin-clavulanate 875-125 MG Tabs  Commonly known as: Augmentin   Take 1 Tablet by  mouth 2 times a day for 9 days.  Dose: 1 Tablet     HYDROcodone-acetaminophen 5-325 MG Tabs per tablet  Commonly known as: Norco   Take 2 Tablets by mouth every 6 hours as needed (severe pain) for up to 10 days.  Dose: 2 Tablet     linezolid 600 MG Tabs  Commonly known as: Zyvox   Take 1 Tablet by mouth every 12 hours for 9 days.  Dose: 600 mg            CHANGE how you take these medications        Instructions   insulin GLARGINE 100 UNIT/ML Soln  What changed:   how much to take  when to take this  Commonly known as: Lantus,Semglee   Inject 20 Units under the skin every evening.  Dose: 20 Units            CONTINUE taking these medications        Instructions   insulin regular 100 Unit/mL Soln  Commonly known as: Humulin R   Inject 2-9 Units under the skin 4 Times a Day,Before Meals and at Bedtime.  Dose: 2-9 Units     rivaroxaban 10 MG Tabs tablet  Commonly known as: Xarelto   Take 1 Tablet by mouth every day at 6 PM.  Dose: 10 mg              Allergies  No Known Allergies    DIET  Orders Placed This Encounter   Procedures    Diet Order Diet: Consistent CHO (Diabetic)     Standing Status:   Standing     Number of Occurrences:   1     Order Specific Question:   Diet:     Answer:   Consistent CHO (Diabetic) [4]       ACTIVITY  As tolerated.  Weight bearing as tolerated    LINES, DRAINS, AND WOUNDS  This is an automated list. Peripheral IVs will be removed prior to discharge.  Peripheral IV 10/10/23 18 G Right Wrist (Active)   Site Assessment Clean;Dry;Intact 10/16/23 2045   Dressing Type Transparent 10/16/23 2045   Line Status Infusing 10/16/23 2045   Dressing Status Clean;Dry;Intact 10/16/23 2045   Dressing Intervention N/A 10/16/23 2045   Dressing Change Due 10/17/23 10/16/23 2045   Date Primary Tubing Changed 10/14/23 10/16/23 2045   Date Secondary Tubing Changed 10/16/23 10/16/23 2045   NEXT Primary Tubing Change  10/21/23 10/16/23 2045   NEXT Secondary Tubing Change  10/17/23 10/16/23 2045   Infiltration Grading  ([facility] [place]) 0 10/16/23 2045   Phlebitis Scale ([facility] Only) 0 10/16/23 2045       Peripheral IV 10/12/23 20 G Anterior;Left Forearm (Active)   Site Assessment Clean;Dry;Intact 10/16/23 2045   Dressing Type Occlusive;Transparent 10/16/23 2045   Line Status Saline locked 10/16/23 2045   Dressing Status Clean;Dry;Intact 10/16/23 2045   Dressing Intervention N/A 10/16/23 2045   Dressing Change Due 10/19/23 10/16/23 2045   Date Primary Tubing Changed 10/14/23 10/15/23 1000   Date Secondary Tubing Changed 10/15/23 10/15/23 2045   NEXT Primary Tubing Change  10/21/23 10/15/23 1000   NEXT Secondary Tubing Change  10/16/23 10/15/23 2045   Infiltration Grading ([facility] [place]) 0 10/16/23 2045   Phlebitis Scale ([facility] Only) 0 10/16/23 2045     Urethral Catheter Other (Comment) 10 Fr. (Active)   Site Assessment Clean;Skin intact 10/17/23 0445   Collection Container Standard drainage bag 10/17/23 0445   Urinary Catheter Care Tamper Evident Seal in Place;Drainage Tube Extended;Drainage Bag Not Overfilled;Drainage Tubing Properly Secured;Drainage Bag Below Bladder Level and Not on Floor 10/17/23 0445   Securement Method Securing device (Describe) 10/17/23 0445   Output (mL) 300 mL 10/17/23 0845      Wound 02/03/23 Other (comment) Jaw Left (Active)       Wound 02/03/23 Pressure Injury Sacrum redness, nonblanchable (Active)       Wound 02/03/23 Heel Bilateral (Active)       Wound 02/06/23 Incision Chin Left 4x4, [place] (Active)       Wound 02/06/23 Incision Mouth 4x4-visible to the eye (Active)       Wound 10/10/23 Full Thickness Wound Open Incision Groin;Labia Left (Active)   Wound Image   10/16/23 1100   Site Assessment IVA 10/16/23 2045   Periwound Assessment IVA 10/16/23 2045   Margins IVA 10/16/23 2045   Closure Secondary intention 10/16/23 2045   Drainage Amount Small 10/16/23 2045   Drainage Description Serosanguineous 10/16/23 2045   Treatments Cleansed;Nonselective debridement;Site care 10/16/23 1100   Wound Cleansing  Approved Wound Cleanser 10/16/23 1100   Periwound Protectant No-sting Skin Prep;Paste Ring;Drape 10/16/23 1100   Dressing Status Clean;Dry;Intact 10/16/23 2045   Dressing Changed Observed 10/16/23 2045   Dressing Cleansing/Solutions Not Applicable 10/16/23 1100   Dressing Options Wound Vac 10/16/23 2045   Dressing Change/Treatment Frequency Monday, Wednesday, Friday, and As Needed 10/16/23 2045   NEXT Dressing Change/Treatment Date 10/18/23 10/16/23 2045   NEXT Weekly Photo (Inpatient Only) 10/18/23 10/16/23 1100   Wound Team Following 3x Weekly 10/16/23 1100   Non-staged Wound Description Full thickness 10/16/23 1100   Wound Length (cm) 7 cm 10/14/23 1300   Wound Width (cm) 11 cm 10/14/23 1300   Wound Depth (cm) 4.8 cm 10/14/23 1300   Wound Surface Area (cm^2) 77 cm^2 10/14/23 1300   Wound Volume (cm^3) 369.6 cm^3 10/14/23 1300   Tunneling (cm) 12 cm 10/14/23 1300   Tunneling Clock Position of Wound 2 10/14/23 1300   Undermining (cm) 5 cm 10/14/23 1300   Undermining of Wound, 1st Location From 12 o'clock;To 1 o'clock 10/14/23 1300   Shape Irregular triangle 10/16/23 1100   Wound Odor None 10/16/23 1100   WOUND NURSE ONLY - Time Spent with Patient (mins) 60 10/16/23 1100       Wound 10/11/23 Pressure Injury Coccyx;Sacrum POA Stage 2 (Active)   Wound Image   10/11/23 1600   Site Assessment IVA 10/16/23 2045   Periwound Assessment IVA 10/16/23 2045   Margins IVA 10/16/23 2045   Closure IVA 10/16/23 2045   Drainage Amount IVA 10/16/23 2045   Drainage Description IVA 10/16/23 2045   Treatments Offloading 10/16/23 2045   Wound Cleansing Approved Wound Cleanser 10/14/23 0454   Periwound Protectant No-sting Skin Prep 10/14/23 0454   Dressing Status Intact;Dry;Clean 10/15/23 2045   Dressing Changed Observed 10/15/23 2045   Dressing Cleansing/Solutions Not Applicable 10/15/23 1000   Dressing Options Offloading Dressing - Sacral 10/16/23 2045   Dressing Change/Treatment Frequency Every 72 hrs, and As Needed 10/16/23 2045    NEXT Dressing Change/Treatment Date 10/19/23 10/16/23 2045   NEXT Weekly Photo (Inpatient Only) 10/18/23 10/15/23 2045   Wound Team Following Not following 10/15/23 1000   WOUND NURSE ONLY - Pressure Injury Stage 2 10/11/23 1600       Wound 10/12/23 Incision Abdomen Left Kerlix x 1 packing, abd dressing, medipore tape (Active)   Site Assessment Pink;Red;Yellow 10/14/23 0454   Periwound Assessment Clean;Dry;Intact;Pink;Red;Edema 10/14/23 0454   Margins Defined edges;Unattached edges 10/14/23 0454   Closure Secondary intention 10/14/23 0454   Drainage Amount Small 10/14/23 0454   Drainage Description Serosanguineous 10/14/23 0454   Dressing Status Clean;Dry;Intact 10/14/23 0454   Dressing Changed Changed 10/14/23 0454   Dressing Cleansing/Solutions Normal Saline 10/14/23 0454   Dressing Options Absorbent Abdominal Pad;Moist Roll Gauze;Hypafix Tape 10/14/23 0454   Dressing Change/Treatment Frequency Daily, and As Needed 10/14/23 0454   NEXT Dressing Change/Treatment Date 10/15/23 10/14/23 0454       Peripheral IV 10/10/23 18 G Right Wrist (Active)   Site Assessment Clean;Dry;Intact 10/16/23 2045   Dressing Type Transparent 10/16/23 2045   Line Status Infusing 10/16/23 2045   Dressing Status Clean;Dry;Intact 10/16/23 2045   Dressing Intervention N/A 10/16/23 2045   Dressing Change Due 10/17/23 10/16/23 2045   Date Primary Tubing Changed 10/14/23 10/16/23 2045   Date Secondary Tubing Changed 10/16/23 10/16/23 2045   NEXT Primary Tubing Change  10/21/23 10/16/23 2045   NEXT Secondary Tubing Change  10/17/23 10/16/23 2045   Infiltration Grading ([facility] [place]) 0 10/16/23 2045   Phlebitis Scale ([facility] Only) 0 10/16/23 2045       Peripheral IV 10/12/23 20 G Anterior;Left Forearm (Active)   Site Assessment Clean;Dry;Intact 10/16/23 2045   Dressing Type Occlusive;Transparent 10/16/23 2045   Line Status Saline locked 10/16/23 2045   Dressing Status Clean;Dry;Intact 10/16/23 2045   Dressing Intervention N/A 10/16/23 2045    Dressing Change Due 10/19/23 10/16/23 2045   Date Primary Tubing Changed 10/14/23 10/15/23 1000   Date Secondary Tubing Changed 10/15/23 10/15/23 2045   NEXT Primary Tubing Change  10/21/23 10/15/23 1000   NEXT Secondary Tubing Change  10/16/23 10/15/23 2045   Infiltration Grading ([facility]) 0 10/16/23 2045   Phlebitis Scale ([facility] Only) 0 10/16/23 2045           Urethral Catheter Other (Comment) 10 Fr. (Active)   Site Assessment Clean;Skin intact 10/17/23 0445   Collection Container Standard drainage bag 10/17/23 0445   Urinary Catheter Care Tamper Evident Seal in Place;Drainage Tube Extended;Drainage Bag Not Overfilled;Drainage Tubing Properly Secured;Drainage Bag Below Bladder Level and Not on Floor 10/17/23 0445   Securement Method Securing device (Describe) 10/17/23 0445   Output (mL) 300 mL 10/17/23 0845        MENTAL STATUS ON TRANSFER      Alert, oriented x4       CONSULTATIONS  Surgery  Infectious disease    PROCEDURES  DATE OF OPERATION:                    10/10/2023     PREOPERATIVE DIAGNOSIS:         Necrotizing soft tissue infection      POSTOPERATIVE DIAGNOSIS:      Necrotizing soft tissue infection     PROCEDURE PERFORMED:           Debridement of left groin, left labia, and left lower abdominal wall     SURGEON:                             Praveen Chisholm M.D.     ASSISTANT:                          Linsey Wegener, APRN.     ANESTHESIOLOGIST:          Belkis Elder M.D.     ANESTHESIA:                       Laryngeal mask anesthesia.     ASA CLASSIFICATION:        III. Emergent.     INDICATIONS: The patient is a 63 year-old woman with a necrotizing soft tissue infection of the left groin, perineum, and lower abdominal wall. She is taken to the operating room for debridement.     The nature of the surgical procedure warranted additional skilled operative assistance from an Advanced Registered Nurse Practitioner (ARNP). The assistant was present during the entire operation. The surgical  assistant performed the following: provided assistance with optimal surgical exposure of the operative field.     FINDINGS: Abscess cavity in the left groin, pus tracking medially across the midline overlying the pub symphysis, inferolaterally into the left labia, and superolaterally onto the left lower abdominal wall.      WOUND CLASSIFICATION:             Class Infection present at the time of surgery (PATOS).     SPECIMEN:                                        Left groin infection culture.     ESTIMATED BLOOD LOSS:             200 mL.     PROCEDURE: Following informed consent, the patient was properly identified, taken to the operating room and placed in supine position where a laryngeal mask anesthesia was administered. Intravenous antibiotics were administered by the anesthesiologist in correct time interval. A Vivas catheter was aseptically placed. Sequential compression devices were employed. The patient was placed in a high lithotomy position with careful attention to padding and support of the extremities. A safety retension strap was secured. Active patient warming devices were utilized. The operative site was widely prepped and draped into a sterile field.  A timeout was conducted with the full attention and participation of all operating room personnel.       An incision was made enlarging one of the already draining sites in the medial area of induration. The abscess cavity was entered bluntly and cultures were taken. The incision was extended inferiorly along the left labia and medially across the midline. There was noted to be continued purulent drainage from the superolateral aspect of the wound. The tissues easily dissected through the subcutaneous tissues above the fascia in the direction. Electrocautery was used to open the wound obliquely towards the anterior superior iliac crest. There was continued purulent material in the subcutaneous tissue as well as pus superficial to the fascia. All  loculations were broken up and necrotic tissue excised using electrocautery. Viable appearing skin was left in place in the hopes of facilitating wound closure in the future. Once back to healthy viable appearing tissue, hemostasis was achieved with electrocautery and the wound was irrigated with warm sterile saline and suctioned dry. The wound was packed with Betadine soaked Kerlex gauze with ABD pads placed over the wound.     The patient tolerated the procedure well, and there were no apparent complications. All sponge, needle, and instrument counts were correct on 2 separate occasions. The patient was placed back in the supine position then was awakened, extubated, and transferred to  the post anesthesia care unit (PACU) in satisfactory condition.    DATE OF OPERATION:                    10/12/2023     PREOPERATIVE DIAGNOSIS:         Left lower quadrant abdominal wound     POSTOPERATIVE DIAGNOSIS:      Left lower quadrant abdominal wound     PROCEDURE PERFORMED:             1) Sharp, excisional debridement of skin, subcutaneous fat, and fascia 4x4 cm square  2) Partial delayed primary closure of chronic wound 8 cm length     SURGEON:                             Rj Gutiérrez M.D.     ASSISTANT:                          MCKENZIE Kincaid.      ANESTHESIOLOGIST:          Vikash Martin MD     ANESTHESIA:                       Laryngeal mask anesthesia.     ASA CLASSIFICATION:        III     INDICATIONS: The patient is a 63 year-old woman with  chronic abscess cavity of the left lower abdomen and suprapubic area that underwent index debridement on 10/10/21  She is taken to the operating room for second look, possible debridement, and possible wound vac placement.      The nature of the surgical procedure warranted additional skilled operative assistance from an Advanced Registered Nurse Practitioner (ARNP). The assistant was present during the entire operation. The surgical assistant performed the  following: provided assistance with optimal surgical exposure of the operative field and provided high complexity, subspecialty decision making input.                 FINDINGS: viable wound cavity with scant areas of necrotic fat and fascia measuring 4x4 square cm     WOUND CLASSIFICATION:             Class IV, Dirty or Infected.     SPECIMEN:                                        None.     ESTIMATED BLOOD LOSS:             5 mL.     PROCEDURE: Following informed consent consent, the patient was properly identified, taken to the operating room and placed in supine position where general anesthesia was administered.  Her previously scheduled antibiotics were continued.  A palomares catheter was in place from earlier in her admission. Sequential compression devices were employed.  TWO rolls of Kerlix were removed from the prior operation and disposed of.  The abdomen was prepped and draped into a sterile field. A timeout was conducted with the full attention and participation of all operating room personnel.     The wound cavity was explored.  No new fluid collections or loculations were identified.  The majority of the wound was viable.  A total of 4 x4 cm sq necrotic skin, subcutaneous fat, and fascia was excised sharply from the wound cavity.  The wound was copiously irrigated and hemostasis achieved with cautery.  The lateral edge of the wound was closed for a total of 8 cm in a delayed primary fashion with 3-0 Nylon vertical mattress sutures.  This was done to create a short tunnel to facilitate future wound care and eventual closure by secondary intent.  ONE roll of sterile saline soaked Kerlix gauze was then tunneled under this area of closure and used to cover the rest of the wound.  We considered placing a wound vac today but there is a small sinus into the left labia majora that would not be adequately sealed from the supine position.     The patient tolerated the procedure well, and there were no apparent  complications. All sponge, needle, and instrument counts were correct on 2 separate occasions. The patient was awakened, extubated, and transferred to  to the post anesthesia care unit (PACU) in satisfactory condition.     PLAN  - No further surgical intervention  - Wound care consult for vac placement at bedside  - Daily dressing changes with sterile saline soaked kerlix until vac placement    LABORATORY  Lab Results   Component Value Date    SODIUM 139 10/14/2023    POTASSIUM 3.8 10/14/2023    CHLORIDE 107 10/14/2023    CO2 24 10/14/2023    GLUCOSE 108 (H) 10/14/2023    BUN 6 (L) 10/14/2023    CREATININE 0.54 10/14/2023        Lab Results   Component Value Date    WBC 10.0 10/14/2023    HEMOGLOBIN 9.6 (L) 10/14/2023    HEMATOCRIT 30.5 (L) 10/14/2023    PLATELETCT 648 (H) 10/14/2023        Total time of the discharge process exceeds 37 minutes.

## 2023-11-07 LAB
FUNGUS SPEC CULT: NORMAL
FUNGUS SPEC FUNGUS STN: NORMAL
SIGNIFICANT IND 70042: NORMAL
SITE SITE: NORMAL
SOURCE SOURCE: NORMAL

## 2023-11-24 PROCEDURE — 87077 CULTURE AEROBIC IDENTIFY: CPT

## 2023-11-24 PROCEDURE — 87086 URINE CULTURE/COLONY COUNT: CPT

## 2023-11-24 PROCEDURE — 87186 SC STD MICRODIL/AGAR DIL: CPT

## 2023-11-27 LAB
BACTERIA UR CULT: ABNORMAL
BACTERIA UR CULT: ABNORMAL
SIGNIFICANT IND 70042: ABNORMAL
SITE SITE: ABNORMAL
SOURCE SOURCE: ABNORMAL

## 2023-12-03 ENCOUNTER — HOSPITAL ENCOUNTER (EMERGENCY)
Facility: MEDICAL CENTER | Age: 63
End: 2023-12-03
Attending: STUDENT IN AN ORGANIZED HEALTH CARE EDUCATION/TRAINING PROGRAM
Payer: MEDICARE

## 2023-12-03 ENCOUNTER — APPOINTMENT (OUTPATIENT)
Dept: RADIOLOGY | Facility: MEDICAL CENTER | Age: 63
End: 2023-12-03
Attending: STUDENT IN AN ORGANIZED HEALTH CARE EDUCATION/TRAINING PROGRAM
Payer: MEDICARE

## 2023-12-03 VITALS
SYSTOLIC BLOOD PRESSURE: 138 MMHG | BODY MASS INDEX: 24.8 KG/M2 | OXYGEN SATURATION: 99 % | RESPIRATION RATE: 18 BRPM | HEIGHT: 67 IN | HEART RATE: 66 BPM | TEMPERATURE: 97.9 F | DIASTOLIC BLOOD PRESSURE: 65 MMHG | WEIGHT: 158 LBS

## 2023-12-03 DIAGNOSIS — R10.31 RIGHT LOWER QUADRANT ABDOMINAL PAIN: ICD-10-CM

## 2023-12-03 LAB
ALBUMIN SERPL BCP-MCNC: 4.1 G/DL (ref 3.2–4.9)
ALBUMIN/GLOB SERPL: 1 G/DL
ALP SERPL-CCNC: 88 U/L (ref 30–99)
ALT SERPL-CCNC: 7 U/L (ref 2–50)
ANION GAP SERPL CALC-SCNC: 15 MMOL/L (ref 7–16)
APPEARANCE UR: CLEAR
AST SERPL-CCNC: 10 U/L (ref 12–45)
BACTERIA #/AREA URNS HPF: NEGATIVE /HPF
BASOPHILS # BLD AUTO: 0.7 % (ref 0–1.8)
BASOPHILS # BLD: 0.05 K/UL (ref 0–0.12)
BILIRUB SERPL-MCNC: 0.4 MG/DL (ref 0.1–1.5)
BILIRUB UR QL STRIP.AUTO: NEGATIVE
BUN SERPL-MCNC: 9 MG/DL (ref 8–22)
CALCIUM ALBUM COR SERPL-MCNC: 9.9 MG/DL (ref 8.5–10.5)
CALCIUM SERPL-MCNC: 10 MG/DL (ref 8.5–10.5)
CHLORIDE SERPL-SCNC: 101 MMOL/L (ref 96–112)
CO2 SERPL-SCNC: 21 MMOL/L (ref 20–33)
COLOR UR: YELLOW
CREAT SERPL-MCNC: 0.64 MG/DL (ref 0.5–1.4)
EOSINOPHIL # BLD AUTO: 0.27 K/UL (ref 0–0.51)
EOSINOPHIL NFR BLD: 3.6 % (ref 0–6.9)
EPI CELLS #/AREA URNS HPF: ABNORMAL /HPF
ERYTHROCYTE [DISTWIDTH] IN BLOOD BY AUTOMATED COUNT: 46.5 FL (ref 35.9–50)
GFR SERPLBLD CREATININE-BSD FMLA CKD-EPI: 100 ML/MIN/1.73 M 2
GLOBULIN SER CALC-MCNC: 4.1 G/DL (ref 1.9–3.5)
GLUCOSE SERPL-MCNC: 105 MG/DL (ref 65–99)
GLUCOSE UR STRIP.AUTO-MCNC: NEGATIVE MG/DL
HCT VFR BLD AUTO: 42.1 % (ref 37–47)
HGB BLD-MCNC: 14.5 G/DL (ref 12–16)
HYALINE CASTS #/AREA URNS LPF: ABNORMAL /LPF
IMM GRANULOCYTES # BLD AUTO: 0.01 K/UL (ref 0–0.11)
IMM GRANULOCYTES NFR BLD AUTO: 0.1 % (ref 0–0.9)
KETONES UR STRIP.AUTO-MCNC: NEGATIVE MG/DL
LEUKOCYTE ESTERASE UR QL STRIP.AUTO: ABNORMAL
LIPASE SERPL-CCNC: 11 U/L (ref 11–82)
LYMPHOCYTES # BLD AUTO: 4.35 K/UL (ref 1–4.8)
LYMPHOCYTES NFR BLD: 57.9 % (ref 22–41)
MCH RBC QN AUTO: 29.8 PG (ref 27–33)
MCHC RBC AUTO-ENTMCNC: 34.4 G/DL (ref 32.2–35.5)
MCV RBC AUTO: 86.6 FL (ref 81.4–97.8)
MICRO URNS: ABNORMAL
MONOCYTES # BLD AUTO: 0.39 K/UL (ref 0–0.85)
MONOCYTES NFR BLD AUTO: 5.2 % (ref 0–13.4)
NEUTROPHILS # BLD AUTO: 2.44 K/UL (ref 1.82–7.42)
NEUTROPHILS NFR BLD: 32.5 % (ref 44–72)
NITRITE UR QL STRIP.AUTO: NEGATIVE
NRBC # BLD AUTO: 0 K/UL
NRBC BLD-RTO: 0 /100 WBC (ref 0–0.2)
PH UR STRIP.AUTO: 7.5 [PH] (ref 5–8)
PLATELET # BLD AUTO: 481 K/UL (ref 164–446)
PMV BLD AUTO: 9.4 FL (ref 9–12.9)
POTASSIUM SERPL-SCNC: 3.6 MMOL/L (ref 3.6–5.5)
PROT SERPL-MCNC: 8.2 G/DL (ref 6–8.2)
PROT UR QL STRIP: NEGATIVE MG/DL
RBC # BLD AUTO: 4.86 M/UL (ref 4.2–5.4)
RBC # URNS HPF: ABNORMAL /HPF
RBC UR QL AUTO: NEGATIVE
SODIUM SERPL-SCNC: 137 MMOL/L (ref 135–145)
SP GR UR STRIP.AUTO: 1.03
UROBILINOGEN UR STRIP.AUTO-MCNC: 0.2 MG/DL
WBC # BLD AUTO: 7.5 K/UL (ref 4.8–10.8)
WBC #/AREA URNS HPF: ABNORMAL /HPF

## 2023-12-03 PROCEDURE — 700102 HCHG RX REV CODE 250 W/ 637 OVERRIDE(OP): Performed by: STUDENT IN AN ORGANIZED HEALTH CARE EDUCATION/TRAINING PROGRAM

## 2023-12-03 PROCEDURE — 80053 COMPREHEN METABOLIC PANEL: CPT

## 2023-12-03 PROCEDURE — 81001 URINALYSIS AUTO W/SCOPE: CPT

## 2023-12-03 PROCEDURE — A9270 NON-COVERED ITEM OR SERVICE: HCPCS | Performed by: STUDENT IN AN ORGANIZED HEALTH CARE EDUCATION/TRAINING PROGRAM

## 2023-12-03 PROCEDURE — 96375 TX/PRO/DX INJ NEW DRUG ADDON: CPT

## 2023-12-03 PROCEDURE — 700111 HCHG RX REV CODE 636 W/ 250 OVERRIDE (IP): Performed by: STUDENT IN AN ORGANIZED HEALTH CARE EDUCATION/TRAINING PROGRAM

## 2023-12-03 PROCEDURE — 74177 CT ABD & PELVIS W/CONTRAST: CPT

## 2023-12-03 PROCEDURE — 36415 COLL VENOUS BLD VENIPUNCTURE: CPT

## 2023-12-03 PROCEDURE — 96374 THER/PROPH/DIAG INJ IV PUSH: CPT | Mod: XU

## 2023-12-03 PROCEDURE — 99285 EMERGENCY DEPT VISIT HI MDM: CPT

## 2023-12-03 PROCEDURE — 83690 ASSAY OF LIPASE: CPT

## 2023-12-03 PROCEDURE — 85025 COMPLETE CBC W/AUTO DIFF WBC: CPT

## 2023-12-03 PROCEDURE — 700117 HCHG RX CONTRAST REV CODE 255: Performed by: STUDENT IN AN ORGANIZED HEALTH CARE EDUCATION/TRAINING PROGRAM

## 2023-12-03 PROCEDURE — 700105 HCHG RX REV CODE 258: Performed by: STUDENT IN AN ORGANIZED HEALTH CARE EDUCATION/TRAINING PROGRAM

## 2023-12-03 RX ORDER — ALUMINA, MAGNESIA, AND SIMETHICONE 2400; 2400; 240 MG/30ML; MG/30ML; MG/30ML
10 SUSPENSION ORAL ONCE
Status: COMPLETED | OUTPATIENT
Start: 2023-12-03 | End: 2023-12-03

## 2023-12-03 RX ORDER — KETOROLAC TROMETHAMINE 30 MG/ML
15 INJECTION, SOLUTION INTRAMUSCULAR; INTRAVENOUS ONCE
Status: COMPLETED | OUTPATIENT
Start: 2023-12-03 | End: 2023-12-03

## 2023-12-03 RX ORDER — SODIUM CHLORIDE 9 MG/ML
1000 INJECTION, SOLUTION INTRAVENOUS ONCE
Status: COMPLETED | OUTPATIENT
Start: 2023-12-03 | End: 2023-12-03

## 2023-12-03 RX ORDER — MORPHINE SULFATE 4 MG/ML
4 INJECTION INTRAVENOUS ONCE
Status: COMPLETED | OUTPATIENT
Start: 2023-12-03 | End: 2023-12-03

## 2023-12-03 RX ADMIN — MORPHINE SULFATE 4 MG: 4 INJECTION, SOLUTION INTRAMUSCULAR; INTRAVENOUS at 02:44

## 2023-12-03 RX ADMIN — KETOROLAC TROMETHAMINE 15 MG: 30 INJECTION, SOLUTION INTRAMUSCULAR; INTRAVENOUS at 04:44

## 2023-12-03 RX ADMIN — ALUMINUM HYDROXIDE, MAGNESIUM HYDROXIDE, AND DIMETHICONE 10 ML: 400; 400; 40 SUSPENSION ORAL at 04:45

## 2023-12-03 RX ADMIN — MORPHINE SULFATE 4 MG: 4 INJECTION, SOLUTION INTRAMUSCULAR; INTRAVENOUS at 04:44

## 2023-12-03 RX ADMIN — SODIUM CHLORIDE 1000 ML: 9 INJECTION, SOLUTION INTRAVENOUS at 02:44

## 2023-12-03 RX ADMIN — IOHEXOL 90 ML: 350 INJECTION, SOLUTION INTRAVENOUS at 03:45

## 2023-12-03 ASSESSMENT — FIBROSIS 4 INDEX: FIB4 SCORE: 0.61

## 2023-12-03 ASSESSMENT — PAIN DESCRIPTION - PAIN TYPE: TYPE: ACUTE PAIN

## 2023-12-03 NOTE — ED PROVIDER NOTES
ED Provider Note    CHIEF COMPLAINT  Chief Complaint   Patient presents with    Abdominal Pain     BIB EMS from [place] due to LLQ abdominal pain that radiates to the back, pt states she is currently taking abx for a known UTI    Diarrhea     Pt states diarrhea for many weeks       EXTERNAL RECORDS REVIEWED  Inpatient Notes hospital admission 10/10/2023 where patient was found to have a necrotizing infection of the suprapubic region.  This required IV antibiotics and surgical debridement.    HPI/ROS  LIMITATION TO HISTORY   Select: : None  OUTSIDE HISTORIAN(S):  None    Fannie Driver is a 63 y.o. female who presents for right lower quadrant abdominal pain.  Patient also notes chronic diarrhea.  She is currently on antibiotic for UTI and is not complete with her dosages yet.  The right lower quadrant abdominal pain she notes has been there since she was diagnosed with an NSTI and required surgical debridement.  This was in October.  She notes the pain has been persistent since then but was worse and constant today.  Typically the pain is intermittent and alleviates with her medications at her skilled nursing facility.  She has had no fevers, no flank pain, no blood in her stool, no dysuria or hematuria, no vomiting.    PAST MEDICAL HISTORY   has a past medical history of Diabetes, Other specified symptom associated with female genital organs, and Personal history of venous thrombosis and embolism.    SURGICAL HISTORY   has a past surgical history that includes other abdominal surgery; cholecystectomy (10/09); ercp in or (10/30/2009); dental osteotomy (N/A, 2/6/2023); irrigation & debridement general (Left, 10/10/2023); irrigation & debridement general (Left, 10/12/2023); and wound closure neuro (Left, 10/12/2023).    FAMILY HISTORY  No family history on file.    SOCIAL HISTORY  Social History     Tobacco Use    Smoking status: Never    Smokeless tobacco: Never   Vaping Use    Vaping Use: Never used   Substance and  "Sexual Activity    Alcohol use: Not on file    Drug use: Not Currently    Sexual activity: Not on file       CURRENT MEDICATIONS  Home Medications       Reviewed by Hermila Erickson R.N. (Registered Nurse) on 12/03/23 at 0152  Med List Status: Partial     Medication Last Dose Status   insulin GLARGINE (LANTUS,SEMGLEE) 100 UNIT/ML Solution  Active   insulin regular (HUMULIN R) 100 Unit/mL Solution  Active   rivaroxaban (XARELTO) 10 MG Tab tablet  Active                    ALLERGIES  No Known Allergies    PHYSICAL EXAM  VITAL SIGNS: /65   Pulse 66   Temp 36.6 °C (97.9 °F) (Temporal)   Resp 18   Ht 1.702 m (5' 7\")   Wt 71.7 kg (158 lb)   SpO2 99%   BMI 24.75 kg/m²    Constitutional: Awake and alert. No acute distress.  Head: NCAT.  HEENT: Normal Conjunctiva. PERRLA.  Neck: Grossly normal range of motion. Airway midline.  Cardiovascular: Normal heart rate, Normal rhythm.  Thorax & Lungs: No respiratory distress. Clear to Auscultation bilaterally.  Abdomen: Normal inspection.  Right lower quadrant tenderness.. Nondistended.  No rebound or guarding.  Skin: Well-healing surgical scar of the suprapubic region with an overlying Mepilex.  Back: No tenderness, No CVA tenderness.   Musculoskeletal: No obvious deformity. Moves all extremities Well.  Neurologic: A&Ox3.   Psychiatric: Mood and affect are appropriate for situation.      LABS  Results for orders placed or performed during the hospital encounter of 12/03/23   CBC with Differential   Result Value Ref Range    WBC 7.5 4.8 - 10.8 K/uL    RBC 4.86 4.20 - 5.40 M/uL    Hemoglobin 14.5 12.0 - 16.0 g/dL    Hematocrit 42.1 37.0 - 47.0 %    MCV 86.6 81.4 - 97.8 fL    MCH 29.8 27.0 - 33.0 pg    MCHC 34.4 32.2 - 35.5 g/dL    RDW 46.5 35.9 - 50.0 fL    Platelet Count 481 (H) 164 - 446 K/uL    MPV 9.4 9.0 - 12.9 fL    Neutrophils-Polys 32.50 (L) 44.00 - 72.00 %    Lymphocytes 57.90 (H) 22.00 - 41.00 %    Monocytes 5.20 0.00 - 13.40 %    Eosinophils 3.60 0.00 - 6.90 %    " Basophils 0.70 0.00 - 1.80 %    Immature Granulocytes 0.10 0.00 - 0.90 %    Nucleated RBC 0.00 0.00 - 0.20 /100 WBC    Neutrophils (Absolute) 2.44 1.82 - 7.42 K/uL    Lymphs (Absolute) 4.35 1.00 - 4.80 K/uL    Monos (Absolute) 0.39 0.00 - 0.85 K/uL    Eos (Absolute) 0.27 0.00 - 0.51 K/uL    Baso (Absolute) 0.05 0.00 - 0.12 K/uL    Immature Granulocytes (abs) 0.01 0.00 - 0.11 K/uL    NRBC (Absolute) 0.00 K/uL   Complete Metabolic Panel   Result Value Ref Range    Sodium 137 135 - 145 mmol/L    Potassium 3.6 3.6 - 5.5 mmol/L    Chloride 101 96 - 112 mmol/L    Co2 21 20 - 33 mmol/L    Anion Gap 15.0 7.0 - 16.0    Glucose 105 (H) 65 - 99 mg/dL    Bun 9 8 - 22 mg/dL    Creatinine 0.64 0.50 - 1.40 mg/dL    Calcium 10.0 8.5 - 10.5 mg/dL    Correct Calcium 9.9 8.5 - 10.5 mg/dL    AST(SGOT) 10 (L) 12 - 45 U/L    ALT(SGPT) 7 2 - 50 U/L    Alkaline Phosphatase 88 30 - 99 U/L    Total Bilirubin 0.4 0.1 - 1.5 mg/dL    Albumin 4.1 3.2 - 4.9 g/dL    Total Protein 8.2 6.0 - 8.2 g/dL    Globulin 4.1 (H) 1.9 - 3.5 g/dL    A-G Ratio 1.0 g/dL   Lipase   Result Value Ref Range    Lipase 11 11 - 82 U/L   Urinalysis    Specimen: Urine   Result Value Ref Range    Color Yellow     Character Clear     Specific Gravity 1.028 <1.035    Ph 7.5 5.0 - 8.0    Glucose Negative Negative mg/dL    Ketones Negative Negative mg/dL    Protein Negative Negative mg/dL    Bilirubin Negative Negative    Urobilinogen, Urine 0.2 Negative    Nitrite Negative Negative    Leukocyte Esterase Small (A) Negative    Occult Blood Negative Negative    Micro Urine Req Microscopic    ESTIMATED GFR   Result Value Ref Range    GFR (CKD-EPI) 100 >60 mL/min/1.73 m 2   URINE MICROSCOPIC (W/UA)   Result Value Ref Range    WBC 5-10 (A) /hpf    RBC 0-2 /hpf    Bacteria Negative None /hpf    Epithelial Cells Few /hpf    Hyaline Cast 0-2 /lpf         RADIOLOGY  I have independently interpreted the diagnostic imaging associated with this visit and am waiting the final reading from  the radiologist.   My preliminary interpretation is as follows: No free air, no abscess  Radiologist interpretation:   CT-ABDOMEN-PELVIS WITH   Final Result      1.  No evidence of residual or recurrent fluid collection in the perineum or LEFT lower anterior abdominal wall. There now appears to be scar in the area.   2.  Prior cholecystectomy with probable subcentimeter retained stone adjacent to the inferior RIGHT liver, unchanged            COURSE & MEDICAL DECISION MAKING    ED Observation Status? No; Patient does not meet criteria for ED Observation.     INITIAL ASSESSMENT, COURSE AND PLAN  Care Narrative:   63-year-old female history notable for NSTI in October presents for right lower quadrant abdominal pain and chronic diarrhea.  Afebrile, hypertension noted  On exam she is right lower quadrant tenderness.  No rebound or guarding.  The recent surgical site appears to be well-healing.  Differential includes intra-abdominal abscess, recurrence of necrotizing infection, appendicitis, small bowel obstruction.  IV fluids and morphine were administered while workup was initiated.  Labs with no leukocytosis, there is a right shift however, CMP is largely unremarkable, patient is currently on antibiotics and urinalysis shows small amount of white cells.  Given her history of NSTI we will proceed with CT imaging to evaluate for intra-abdominal abscess or other potential worsening or complications related to this.  CT reveals no acute findings.  Patient has had reasonable improvement in her pain.  Discussed follow-up with her surgeon regarding her persistent pain symptoms.  Discharged back to facility.    HYDRATION: Based on the patient's presentation of Dehydration the patient was given IV fluids. IV Hydration was used because oral hydration was not as rapid as required. Upon recheck following hydration, the patient was Improved.  HTN/IDDM FOLLOW UP:  The patient is referred to a primary physician for blood pressure  management, diabetic screening, and for all other preventive health concerns      ADDITIONAL PROBLEM LIST    Hypertension  UTI - on ABX  DISPOSITION AND DISCUSSIONS  I have discussed management of the patient with the following physicians and DESIREE's:  None    Discussion of management with other QHP or appropriate source(s): None     Escalation of care considered, and ultimately not performed:blood analysis, diagnostic imaging, and acute inpatient care management, however at this time, the patient is most appropriate for outpatient management    Barriers to care at this time, including but not limited to: Patient does not have established PCP.     Decision tools and prescription drugs considered including, but not limited to: Pain Medications For postsurgical pain .    FINAL DIAGNOSIS  1. Right lower quadrant abdominal pain           Electronically signed by: Angella Do D.O., 12/3/2023 2:33 AM

## 2023-12-03 NOTE — ED NOTES
Bedside report received from off going RN: Hermila, assumed care of patient.  POC discussed with patient. Call light within reach, all needs addressed at this time. Pt currently in CT.       Fall risk interventions in place: Patient's personal possessions are with in their safe reach, Place fall risk sign on patient's door, and Keep floor surfaces clean and dry (all applicable per [facility] Fall risk assessment)   Continuous monitoring: Cardiac Leads, Pulse Ox, or Blood Pressure  IVF/IV medications: Infusion per MAR (List Med(s)) NS bolus  Oxygen: Room Air  Bedside sitter: Not Applicable   Isolation: Not Applicable

## 2023-12-03 NOTE — ED TRIAGE NOTES
Chief Complaint   Patient presents with    Abdominal Pain     BIB EMS from [place] due to LLQ abdominal pain that radiates to the back, pt states she is currently taking abx for a known UTI    Diarrhea     Pt states diarrhea for many weeks     Pt A+O x 4, answering all questions appropriately, NAD, pt states she recently had a wound vac removed from her R lower groin and isn't sure if that is what is causing her abdominal pain

## 2023-12-03 NOTE — DISCHARGE PLANNING
HANNAH was asked to set up [facility] transport back to [facility].  CM completed the PCS form and transfer packet.  PCS faxed to [place], HANNAH called and  time scheduled for approx. 6:00 am.  Transfer packet provided to Valentina DARNELL.  All parties aware of transfer back to [facility].

## 2023-12-03 NOTE — ED NOTES
Bedside report given to RN Shirley    Oxygen:RA  Fall Risk status: sign on door, bed in lowest position/locked, call light within reach  Patient Mentation:A+O x 4  Continuous cardiac monitoring: NSR  NS Bolus

## 2023-12-03 NOTE — ED NOTES
Pt updated on POC - discharging back to [place], pending [facility] transportation. Pt verbalized understanding.

## 2023-12-03 NOTE — ED NOTES
"Pt discharged to [facility] with [facility] transportation, pt A&ox4, on room air, [place]. IV discontinued and gauze placed, pt in possession of belongings. Pt provided discharge education and information pertaining to medications and follow up appointments. Pt received copy of discharge instructions and verbalized understanding. /65   Pulse 69   Temp 36.6 °C (97.9 °F) (Temporal)   Resp 18   Ht 1.702 m (5' 7\")   Wt 71.7 kg (158 lb)   SpO2 96%   BMI 24.75 kg/m²     "

## 2024-11-24 ENCOUNTER — HOSPITAL ENCOUNTER (OUTPATIENT)
Facility: MEDICAL CENTER | Age: 64
End: 2024-11-24
Attending: INTERNAL MEDICINE

## (undated) DEVICE — HEADREST PRONEVIEW LARGE - (10/CA)

## (undated) DEVICE — KIT  I.V. START (100EA/CA)

## (undated) DEVICE — LACTATED RINGERS INJ 1000 ML - (14EA/CA 60CA/PF)

## (undated) DEVICE — SENSOR OXIMETER ADULT SPO2 RD SET (20EA/BX)

## (undated) DEVICE — COVER LIGHT HANDLE ALC PLUS DISP (18EA/BX)

## (undated) DEVICE — SLEEVE VASO CALF MED - (10PR/CA)

## (undated) DEVICE — SET LEADWIRE 5 LEAD BEDSIDE DISPOSABLE ECG (1SET OF 5/EA)

## (undated) DEVICE — SODIUM CHL IRRIGATION 0.9% 1000ML (12EA/CA)

## (undated) DEVICE — GOWN WARMING STANDARD FLEX - (30/CA)

## (undated) DEVICE — CANISTER SUCTION RIGID RED 1500CC (40EA/CA)

## (undated) DEVICE — GLOVE BIOGEL SZ 7 SURGICAL PF LTX - (50PR/BX 4BX/CA)

## (undated) DEVICE — CANISTER SUCTION 3000ML MECHANICAL FILTER AUTO SHUTOFF MEDI-VAC NONSTERILE LF DISP  (40EA/CA)

## (undated) DEVICE — CANNULA O2 COMFORT SOFT EAR ADULT 7 FT TUBING (50/CA)

## (undated) DEVICE — BOVIE BLADE COATED - (50/PK)

## (undated) DEVICE — STAPLER SKIN DISP - (6/BX 10BX/CA) VISISTAT

## (undated) DEVICE — SUTURE GENERAL

## (undated) DEVICE — JAW BRA #93

## (undated) DEVICE — SPONGE GAUZESTER 4 X 4 4PLY - (128PK/CA)

## (undated) DEVICE — TRAY SRGPRP PVP IOD WT PRP - (20/CA)

## (undated) DEVICE — ARMREST CRADLE FOAM - (2PR/PK 12PR/CA)

## (undated) DEVICE — GLOVE SZ 7 BIOGEL PI MICRO - PF LF (50PR/BX 4BX/CA)

## (undated) DEVICE — MASK OXYGEN VNYL ADLT MED CONC WITH 7 FOOT TUBING  - (50EA/CA)

## (undated) DEVICE — SODIUM CHL. IRRIGATION 0.9% 3000ML (4EA/CA 65CA/PF)

## (undated) DEVICE — LACTATED RINGERS INJ. 500 ML - (24EA/CA)

## (undated) DEVICE — SET EXTENSION WITH 2 PORTS (48EA/CA) ***PART #2C8610 IS A SUBSTITUTE*****

## (undated) DEVICE — SUTURE 3-0 ETHILON FS-1 - (36/BX) 30 INCH

## (undated) DEVICE — SUTURE 3-0 SILK SH 30 (36PK/BX)

## (undated) DEVICE — ELECTRODE DUAL RETURN W/ CORD - (50/PK)

## (undated) DEVICE — DRAPE LAPAROTOMY T SHEET - (12EA/CA)

## (undated) DEVICE — CANNULA W/ SUPPLY TUBING O2 - (50/CA)

## (undated) DEVICE — SUTURE 3-0 CHROMIC GUT SH 27 (36PK/BX)"

## (undated) DEVICE — TUBING CLEARLINK DUO-VENT - C-FLO (48EA/CA)

## (undated) DEVICE — PACK ENT OR - (2EA/CA)

## (undated) DEVICE — GLOVE BIOGEL INDICATOR SZ 7.5 SURGICAL PF LTX - (50PR/BX 4BX/CA)

## (undated) DEVICE — PACK MINOR BASIN - (2EA/CA)

## (undated) DEVICE — BUR 703 2.1 (ORAL)

## (undated) DEVICE — WATER IRRIGATION STERILE 1000ML (12EA/CA)

## (undated) DEVICE — TUBE CONNECTING SUCTION - CLEAR PLASTIC STERILE 72 IN (50EA/CA)

## (undated) DEVICE — TOWEL STOP TIMEOUT SAFETY FLAG (40EA/CA)

## (undated) DEVICE — SYRINGE 10 ML CONTROL LL (25EA/BX 4BX/CA)

## (undated) DEVICE — SUCTION INSTRUMENT YANKAUER BULBOUS TIP W/O VENT (50EA/CA)

## (undated) DEVICE — GLOVE BIOGEL PI INDICATOR SZ 6.5 SURGICAL PF LF - (50/BX 4BX/CA)

## (undated) DEVICE — DRAPE LARGE 3 QUARTER - (20/CA)

## (undated) DEVICE — SUTURE 3-0 SILK 12 X 18 IN - (36/BX)

## (undated) DEVICE — CONTAINER SPECIMEN BAG OR - STERILE 4 OZ W/LID (100EA/CA)

## (undated) DEVICE — CATHETER IV 14 GA X 2 ---SURG.& SDS ONLY---(200EA/CA)

## (undated) DEVICE — BANDAGE ROLL STERILE BULKEE 4.5 IN X 4 YD (100EA/CA)

## (undated) DEVICE — BLADE SURGICAL #15 - (50/BX 3BX/CA)